# Patient Record
Sex: MALE | Race: WHITE | NOT HISPANIC OR LATINO | Employment: OTHER | URBAN - METROPOLITAN AREA
[De-identification: names, ages, dates, MRNs, and addresses within clinical notes are randomized per-mention and may not be internally consistent; named-entity substitution may affect disease eponyms.]

---

## 2020-09-16 ENCOUNTER — APPOINTMENT (OUTPATIENT)
Dept: CT IMAGING | Facility: CLINIC | Age: 85
DRG: 085 | End: 2020-09-16
Attending: NURSE PRACTITIONER
Payer: COMMERCIAL

## 2020-09-16 ENCOUNTER — HOSPITAL ENCOUNTER (INPATIENT)
Facility: CLINIC | Age: 85
LOS: 5 days | Discharge: NURSING, REHAB OR LONG-TERM CARE | DRG: 085 | End: 2020-09-21
Attending: EMERGENCY MEDICINE | Admitting: INTERNAL MEDICINE
Payer: COMMERCIAL

## 2020-09-16 ENCOUNTER — APPOINTMENT (OUTPATIENT)
Dept: CT IMAGING | Facility: CLINIC | Age: 85
DRG: 085 | End: 2020-09-16
Attending: EMERGENCY MEDICINE
Payer: COMMERCIAL
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
## 2020-09-16 DIAGNOSIS — E78.5 DYSLIPIDEMIA: ICD-10-CM

## 2020-09-16 DIAGNOSIS — I48.20 CHRONIC ATRIAL FIBRILLATION (H): ICD-10-CM

## 2020-09-16 DIAGNOSIS — I62.9 INTRACRANIAL HEMORRHAGE (H): Primary | ICD-10-CM

## 2020-09-16 DIAGNOSIS — S06.330A INTRAPARENCHYMAL HEMATOMA OF BRAIN WITHOUT LOSS OF CONSCIOUSNESS, UNSPECIFIED LATERALITY, INITIAL ENCOUNTER (H): ICD-10-CM

## 2020-09-16 LAB
ALBUMIN UR-MCNC: NEGATIVE MG/DL
ANION GAP SERPL CALCULATED.3IONS-SCNC: 2 MMOL/L (ref 3–14)
APPEARANCE UR: CLEAR
BACTERIA #/AREA URNS HPF: ABNORMAL /HPF
BASOPHILS # BLD AUTO: 0 10E9/L (ref 0–0.2)
BASOPHILS NFR BLD AUTO: 0.3 %
BILIRUB UR QL STRIP: NEGATIVE
BUN SERPL-MCNC: 19 MG/DL (ref 7–30)
CALCIUM SERPL-MCNC: 8.7 MG/DL (ref 8.5–10.1)
CHLORIDE SERPL-SCNC: 107 MMOL/L (ref 94–109)
CO2 SERPL-SCNC: 31 MMOL/L (ref 20–32)
COLOR UR AUTO: YELLOW
CREAT SERPL-MCNC: 1.1 MG/DL (ref 0.66–1.25)
DIFFERENTIAL METHOD BLD: NORMAL
EOSINOPHIL # BLD AUTO: 0.1 10E9/L (ref 0–0.7)
EOSINOPHIL NFR BLD AUTO: 1.6 %
ERYTHROCYTE [DISTWIDTH] IN BLOOD BY AUTOMATED COUNT: 12.4 % (ref 10–15)
GFR SERPL CREATININE-BSD FRML MDRD: 60 ML/MIN/{1.73_M2}
GLUCOSE BLDC GLUCOMTR-MCNC: 105 MG/DL (ref 70–99)
GLUCOSE BLDC GLUCOMTR-MCNC: 76 MG/DL (ref 70–99)
GLUCOSE BLDC GLUCOMTR-MCNC: 91 MG/DL (ref 70–99)
GLUCOSE SERPL-MCNC: 112 MG/DL (ref 70–99)
GLUCOSE UR STRIP-MCNC: NEGATIVE MG/DL
HCT VFR BLD AUTO: 43.2 % (ref 40–53)
HGB BLD-MCNC: 14.6 G/DL (ref 13.3–17.7)
HGB UR QL STRIP: NEGATIVE
IMM GRANULOCYTES # BLD: 0 10E9/L (ref 0–0.4)
IMM GRANULOCYTES NFR BLD: 0.3 %
INR PPP: 1.59 (ref 0.86–1.14)
INR PPP: 1.74 (ref 0.86–1.14)
INTERPRETATION ECG - MUSE: NORMAL
KETONES UR STRIP-MCNC: NEGATIVE MG/DL
LABORATORY COMMENT REPORT: NORMAL
LEUKOCYTE ESTERASE UR QL STRIP: ABNORMAL
LYMPHOCYTES # BLD AUTO: 1.8 10E9/L (ref 0.8–5.3)
LYMPHOCYTES NFR BLD AUTO: 27.4 %
MCH RBC QN AUTO: 33 PG (ref 26.5–33)
MCHC RBC AUTO-ENTMCNC: 33.8 G/DL (ref 31.5–36.5)
MCV RBC AUTO: 98 FL (ref 78–100)
MONOCYTES # BLD AUTO: 0.8 10E9/L (ref 0–1.3)
MONOCYTES NFR BLD AUTO: 11.2 %
NEUTROPHILS # BLD AUTO: 4 10E9/L (ref 1.6–8.3)
NEUTROPHILS NFR BLD AUTO: 59.2 %
NITRATE UR QL: NEGATIVE
NRBC # BLD AUTO: 0 10*3/UL
NRBC BLD AUTO-RTO: 0 /100
PH UR STRIP: 6.5 PH (ref 5–7)
PLATELET # BLD AUTO: 210 10E9/L (ref 150–450)
POTASSIUM SERPL-SCNC: 4.5 MMOL/L (ref 3.4–5.3)
RADIOLOGIST FLAGS: ABNORMAL
RBC # BLD AUTO: 4.43 10E12/L (ref 4.4–5.9)
RBC #/AREA URNS AUTO: 1 /HPF (ref 0–2)
SARS-COV-2 RNA SPEC QL NAA+PROBE: NEGATIVE
SARS-COV-2 RNA SPEC QL NAA+PROBE: NORMAL
SODIUM SERPL-SCNC: 140 MMOL/L (ref 133–144)
SOURCE: ABNORMAL
SP GR UR STRIP: 1.02 (ref 1–1.03)
SPECIMEN SOURCE: NORMAL
SPECIMEN SOURCE: NORMAL
UROBILINOGEN UR STRIP-MCNC: NORMAL MG/DL (ref 0–2)
WBC # BLD AUTO: 6.7 10E9/L (ref 4–11)
WBC #/AREA URNS AUTO: 12 /HPF (ref 0–5)

## 2020-09-16 PROCEDURE — 70496 CT ANGIOGRAPHY HEAD: CPT

## 2020-09-16 PROCEDURE — 85025 COMPLETE CBC W/AUTO DIFF WBC: CPT | Performed by: EMERGENCY MEDICINE

## 2020-09-16 PROCEDURE — 36415 COLL VENOUS BLD VENIPUNCTURE: CPT | Performed by: INTERNAL MEDICINE

## 2020-09-16 PROCEDURE — 30283B1 TRANSFUSION OF NONAUTOLOGOUS 4-FACTOR PROTHROMBIN COMPLEX CONCENTRATE INTO VEIN, PERCUTANEOUS APPROACH: ICD-10-PCS | Performed by: EMERGENCY MEDICINE

## 2020-09-16 PROCEDURE — 25000132 ZZH RX MED GY IP 250 OP 250 PS 637: Mod: GY | Performed by: INTERNAL MEDICINE

## 2020-09-16 PROCEDURE — 85610 PROTHROMBIN TIME: CPT | Performed by: INTERNAL MEDICINE

## 2020-09-16 PROCEDURE — 93005 ELECTROCARDIOGRAM TRACING: CPT

## 2020-09-16 PROCEDURE — 70450 CT HEAD/BRAIN W/O DYE: CPT | Mod: 77

## 2020-09-16 PROCEDURE — 96374 THER/PROPH/DIAG INJ IV PUSH: CPT

## 2020-09-16 PROCEDURE — 99291 CRITICAL CARE FIRST HOUR: CPT | Performed by: PSYCHIATRY & NEUROLOGY

## 2020-09-16 PROCEDURE — 80048 BASIC METABOLIC PNL TOTAL CA: CPT | Performed by: EMERGENCY MEDICINE

## 2020-09-16 PROCEDURE — 25000125 ZZHC RX 250: Performed by: INTERNAL MEDICINE

## 2020-09-16 PROCEDURE — C9132 KCENTRA, PER I.U.: HCPCS | Performed by: EMERGENCY MEDICINE

## 2020-09-16 PROCEDURE — U0003 INFECTIOUS AGENT DETECTION BY NUCLEIC ACID (DNA OR RNA); SEVERE ACUTE RESPIRATORY SYNDROME CORONAVIRUS 2 (SARS-COV-2) (CORONAVIRUS DISEASE [COVID-19]), AMPLIFIED PROBE TECHNIQUE, MAKING USE OF HIGH THROUGHPUT TECHNOLOGIES AS DESCRIBED BY CMS-2020-01-R: HCPCS | Performed by: EMERGENCY MEDICINE

## 2020-09-16 PROCEDURE — 99285 EMERGENCY DEPT VISIT HI MDM: CPT | Mod: 25

## 2020-09-16 PROCEDURE — 25000128 H RX IP 250 OP 636: Performed by: INTERNAL MEDICINE

## 2020-09-16 PROCEDURE — 81001 URINALYSIS AUTO W/SCOPE: CPT | Performed by: EMERGENCY MEDICINE

## 2020-09-16 PROCEDURE — 25000555 ZZHC RX FACTOR IP 250 OP 636: Performed by: EMERGENCY MEDICINE

## 2020-09-16 PROCEDURE — 00000146 ZZHCL STATISTIC GLUCOSE BY METER IP

## 2020-09-16 PROCEDURE — 20000003 ZZH R&B ICU

## 2020-09-16 PROCEDURE — 99223 1ST HOSP IP/OBS HIGH 75: CPT | Mod: AI | Performed by: INTERNAL MEDICINE

## 2020-09-16 PROCEDURE — 25800030 ZZH RX IP 258 OP 636: Performed by: INTERNAL MEDICINE

## 2020-09-16 PROCEDURE — 87086 URINE CULTURE/COLONY COUNT: CPT | Performed by: INTERNAL MEDICINE

## 2020-09-16 PROCEDURE — 85610 PROTHROMBIN TIME: CPT | Performed by: EMERGENCY MEDICINE

## 2020-09-16 PROCEDURE — C9803 HOPD COVID-19 SPEC COLLECT: HCPCS

## 2020-09-16 PROCEDURE — 70450 CT HEAD/BRAIN W/O DYE: CPT

## 2020-09-16 RX ORDER — POTASSIUM CHLORIDE 1500 MG/1
20-40 TABLET, EXTENDED RELEASE ORAL
Status: DISCONTINUED | OUTPATIENT
Start: 2020-09-16 | End: 2020-09-21 | Stop reason: HOSPADM

## 2020-09-16 RX ORDER — NALOXONE HYDROCHLORIDE 0.4 MG/ML
.1-.4 INJECTION, SOLUTION INTRAMUSCULAR; INTRAVENOUS; SUBCUTANEOUS
Status: DISCONTINUED | OUTPATIENT
Start: 2020-09-16 | End: 2020-09-21 | Stop reason: HOSPADM

## 2020-09-16 RX ORDER — ONDANSETRON 4 MG/1
4 TABLET, ORALLY DISINTEGRATING ORAL EVERY 6 HOURS PRN
Status: DISCONTINUED | OUTPATIENT
Start: 2020-09-16 | End: 2020-09-21 | Stop reason: HOSPADM

## 2020-09-16 RX ORDER — METOCLOPRAMIDE 5 MG/1
5 TABLET ORAL EVERY 6 HOURS PRN
Status: DISCONTINUED | OUTPATIENT
Start: 2020-09-16 | End: 2020-09-21 | Stop reason: HOSPADM

## 2020-09-16 RX ORDER — ONDANSETRON 2 MG/ML
4 INJECTION INTRAMUSCULAR; INTRAVENOUS EVERY 6 HOURS PRN
Status: DISCONTINUED | OUTPATIENT
Start: 2020-09-16 | End: 2020-09-21 | Stop reason: HOSPADM

## 2020-09-16 RX ORDER — VENLAFAXINE HYDROCHLORIDE 75 MG/1
225 CAPSULE, EXTENDED RELEASE ORAL EVERY EVENING
Status: DISCONTINUED | OUTPATIENT
Start: 2020-09-16 | End: 2020-09-21 | Stop reason: HOSPADM

## 2020-09-16 RX ORDER — PROCHLORPERAZINE MALEATE 5 MG
5 TABLET ORAL EVERY 6 HOURS PRN
Status: DISCONTINUED | OUTPATIENT
Start: 2020-09-16 | End: 2020-09-21 | Stop reason: HOSPADM

## 2020-09-16 RX ORDER — POTASSIUM CHLORIDE 29.8 MG/ML
20 INJECTION INTRAVENOUS
Status: DISCONTINUED | OUTPATIENT
Start: 2020-09-16 | End: 2020-09-21 | Stop reason: HOSPADM

## 2020-09-16 RX ORDER — LABETALOL HYDROCHLORIDE 5 MG/ML
10-20 INJECTION, SOLUTION INTRAVENOUS EVERY 10 MIN PRN
Status: DISCONTINUED | OUTPATIENT
Start: 2020-09-16 | End: 2020-09-21 | Stop reason: HOSPADM

## 2020-09-16 RX ORDER — AMOXICILLIN 250 MG
1 CAPSULE ORAL 2 TIMES DAILY PRN
Status: DISCONTINUED | OUTPATIENT
Start: 2020-09-16 | End: 2020-09-21 | Stop reason: HOSPADM

## 2020-09-16 RX ORDER — ACETAMINOPHEN 325 MG/1
650 TABLET ORAL EVERY 4 HOURS PRN
Status: DISCONTINUED | OUTPATIENT
Start: 2020-09-16 | End: 2020-09-21 | Stop reason: HOSPADM

## 2020-09-16 RX ORDER — AMOXICILLIN 250 MG
1 CAPSULE ORAL 2 TIMES DAILY
COMMUNITY
End: 2021-01-08

## 2020-09-16 RX ORDER — POTASSIUM CHLORIDE 7.45 MG/ML
10 INJECTION INTRAVENOUS
Status: DISCONTINUED | OUTPATIENT
Start: 2020-09-16 | End: 2020-09-21 | Stop reason: HOSPADM

## 2020-09-16 RX ORDER — ACETAMINOPHEN 325 MG/1
650 TABLET ORAL EVERY 4 HOURS PRN
COMMUNITY
End: 2021-01-18

## 2020-09-16 RX ORDER — VENLAFAXINE HYDROCHLORIDE 225 MG/1
225 TABLET, EXTENDED RELEASE ORAL EVERY EVENING
COMMUNITY
End: 2022-01-01

## 2020-09-16 RX ORDER — METOCLOPRAMIDE HYDROCHLORIDE 5 MG/ML
5 INJECTION INTRAMUSCULAR; INTRAVENOUS EVERY 6 HOURS PRN
Status: DISCONTINUED | OUTPATIENT
Start: 2020-09-16 | End: 2020-09-21 | Stop reason: HOSPADM

## 2020-09-16 RX ORDER — POLYETHYLENE GLYCOL 3350 17 G/17G
17 POWDER, FOR SOLUTION ORAL DAILY PRN
Status: DISCONTINUED | OUTPATIENT
Start: 2020-09-16 | End: 2020-09-21 | Stop reason: HOSPADM

## 2020-09-16 RX ORDER — POTASSIUM CL/LIDO/0.9 % NACL 10MEQ/0.1L
10 INTRAVENOUS SOLUTION, PIGGYBACK (ML) INTRAVENOUS
Status: DISCONTINUED | OUTPATIENT
Start: 2020-09-16 | End: 2020-09-21 | Stop reason: HOSPADM

## 2020-09-16 RX ORDER — ACETAMINOPHEN 650 MG/1
650 SUPPOSITORY RECTAL EVERY 4 HOURS PRN
Status: DISCONTINUED | OUTPATIENT
Start: 2020-09-16 | End: 2020-09-21 | Stop reason: HOSPADM

## 2020-09-16 RX ORDER — HYDRALAZINE HYDROCHLORIDE 20 MG/ML
10-20 INJECTION INTRAMUSCULAR; INTRAVENOUS
Status: DISCONTINUED | OUTPATIENT
Start: 2020-09-16 | End: 2020-09-21 | Stop reason: HOSPADM

## 2020-09-16 RX ORDER — SODIUM CHLORIDE 9 MG/ML
INJECTION, SOLUTION INTRAVENOUS CONTINUOUS
Status: DISCONTINUED | OUTPATIENT
Start: 2020-09-16 | End: 2020-09-18

## 2020-09-16 RX ORDER — LANOLIN ALCOHOL/MO/W.PET/CERES
3 CREAM (GRAM) TOPICAL AT BEDTIME
COMMUNITY

## 2020-09-16 RX ORDER — AMOXICILLIN 250 MG
2 CAPSULE ORAL 2 TIMES DAILY PRN
Status: DISCONTINUED | OUTPATIENT
Start: 2020-09-16 | End: 2020-09-21 | Stop reason: HOSPADM

## 2020-09-16 RX ORDER — PROCHLORPERAZINE 25 MG
12.5 SUPPOSITORY, RECTAL RECTAL EVERY 12 HOURS PRN
Status: DISCONTINUED | OUTPATIENT
Start: 2020-09-16 | End: 2020-09-21 | Stop reason: HOSPADM

## 2020-09-16 RX ORDER — POTASSIUM CHLORIDE 1.5 G/1.58G
20-40 POWDER, FOR SOLUTION ORAL
Status: DISCONTINUED | OUTPATIENT
Start: 2020-09-16 | End: 2020-09-21 | Stop reason: HOSPADM

## 2020-09-16 RX ORDER — CHOLECALCIFEROL (VITAMIN D3) 50 MCG
50 TABLET ORAL EVERY MORNING
COMMUNITY

## 2020-09-16 RX ORDER — IOPAMIDOL 755 MG/ML
70 INJECTION, SOLUTION INTRAVASCULAR ONCE
Status: COMPLETED | OUTPATIENT
Start: 2020-09-16 | End: 2020-09-16

## 2020-09-16 RX ORDER — POLYETHYLENE GLYCOL 3350 17 G/17G
1 POWDER, FOR SOLUTION ORAL DAILY
COMMUNITY
End: 2022-01-01

## 2020-09-16 RX ADMIN — FAMOTIDINE 20 MG: 10 INJECTION, SOLUTION INTRAVENOUS at 22:24

## 2020-09-16 RX ADMIN — IOPAMIDOL 70 ML: 755 INJECTION, SOLUTION INTRAVENOUS at 15:46

## 2020-09-16 RX ADMIN — VENLAFAXINE HYDROCHLORIDE 225 MG: 75 CAPSULE, EXTENDED RELEASE ORAL at 22:23

## 2020-09-16 RX ADMIN — PROTHROMBIN, COAGULATION FACTOR VII HUMAN, COAGULATION FACTOR IX HUMAN, COAGULATION FACTOR X HUMAN, PROTEIN C, PROTEIN S HUMAN, AND WATER 4586 UNITS: KIT at 11:17

## 2020-09-16 RX ADMIN — SODIUM CHLORIDE: 9 INJECTION, SOLUTION INTRAVENOUS at 14:08

## 2020-09-16 RX ADMIN — SODIUM CHLORIDE 90 ML: 9 INJECTION, SOLUTION INTRAVENOUS at 15:46

## 2020-09-16 SDOH — HEALTH STABILITY: MENTAL HEALTH: HOW OFTEN DO YOU HAVE A DRINK CONTAINING ALCOHOL?: NEVER

## 2020-09-16 ASSESSMENT — VISUAL ACUITY
OU: BASELINE

## 2020-09-16 ASSESSMENT — ACTIVITIES OF DAILY LIVING (ADL)
ADLS_ACUITY_SCORE: 25
ADLS_ACUITY_SCORE: 24

## 2020-09-16 ASSESSMENT — ENCOUNTER SYMPTOMS
DIZZINESS: 0
FREQUENCY: 0
VOMITING: 0
WOUND: 1
ARTHRALGIAS: 0
COUGH: 0
DYSURIA: 0
FEVER: 0
JOINT SWELLING: 1
HEMATURIA: 0
HEADACHES: 0
BLOOD IN STOOL: 0
SHORTNESS OF BREATH: 0
LIGHT-HEADEDNESS: 0

## 2020-09-16 NOTE — PLAN OF CARE
SLP: Orders received, chart reviewed. Pt passed RN dysphagia screen = OK for a regular diet, text paged MD. SLP to follow up tomorrow, after PO intake, to see if any difficulty or concerns are noted that would warrant a full dysphagia evaluation. RN in agreement.

## 2020-09-16 NOTE — PLAN OF CARE
PT-Reviewed chart and discussed with nurse. Patient currently has bedrest orders. Will reschedule for tomorrow afternoon so bedrest orders can be addressed.

## 2020-09-16 NOTE — ED TRIAGE NOTES
Pt reports having a ground level fall this AM while going to the bathroom. Pt tripped and fell on right knee, then hitting top of head. No LOC. Pt on xarelto.

## 2020-09-16 NOTE — CONSULTS
"  [facility]    Stroke Consult Note    Reason for Consult:  ICH    Chief Complaint: Fall       HPI  Maricarmen Lovell is a 86 year old male with past medical history significant for atrial fibrillation (on Xarelto), Alzheimer's, CKD, HTN, HLD and recurrent falls who presented for evaluation after a fall. He reportedly sustained a mechanical fall when leaving the bathroom, turning and hitting his head. CTH revealed a small acute IPH in the right basal ganglia. He was reversed with KCentra in the ED. Repeat CTH 6 hours later was stable.    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 We will continue to follow.     NARESH Snider, CNP  Neurology  To page me or covering stroke neurology team member, click here: AMCOM   Choose \"On Call\" tab at top, then search dropdown box for \"Neurology Adult\", select location, press Enter, then look for stroke/neuro ICU/telestroke.    _____________________________________________________    Past Medical History   History reviewed. No pertinent past medical history.  Past Surgical History   History reviewed. No pertinent surgical history.  Medications   Home Meds  Prior to Admission medications    Medication Sig Start Date End Date Taking? Authorizing Provider   acetaminophen (TYLENOL) 325 MG tablet Take 650 mg by mouth every 4 hours as needed for mild pain   Yes Unknown, Entered By History   hypromellose (ARTIFICIAL TEARS) 0.5 % SOLN ophthalmic solution Place 1 drop into both eyes every hour as needed for dry eyes   Yes Unknown, Entered By History   melatonin 3 MG tablet Take 1 mg by mouth At Bedtime   Yes Unknown, Entered By History   polyethylene glycol (MIRALAX) 17 g packet Take 1 packet by mouth daily as needed for constipation   Yes Unknown, Entered By " History   psyllium (METAMUCIL/KONSYL) Packet Take 1 packet by mouth daily as needed for constipation   Yes Unknown, Entered By History   rivaroxaban ANTICOAGULANT (XARELTO) 15 MG TABS tablet Take 15 mg by mouth every morning   Yes Unknown, Entered By History   senna-docusate (SENNA S) 8.6-50 MG tablet Take 1 tablet by mouth 2 times daily   Yes Unknown, Entered By History   venlafaxine (EFFEXOR-ER) 225 MG 24 hr tablet Take 225 mg by mouth daily   Yes Unknown, Entered By History   vitamin D3 (CHOLECALCIFEROL) 50 mcg (2000 units) tablet Take 1 tablet by mouth daily   Yes Unknown, Entered By History       Scheduled Meds    famotidine  20 mg Intravenous Q12H     venlafaxine  225 mg Oral QPM       Infusion Meds    - MEDICATION INSTRUCTIONS -       sodium chloride 75 mL/hr at 09/16/20 1408       PRN Meds  sodium chloride 0.9%, acetaminophen, acetaminophen, hydrALAZINE, hypromellose-dextran, labetalol, - MEDICATION INSTRUCTIONS -, metoclopramide **OR** metoclopramide, naloxone, ondansetron **OR** ondansetron, polyethylene glycol, potassium chloride, potassium chloride with lidocaine, potassium chloride, potassium chloride, potassium chloride, prochlorperazine **OR** prochlorperazine **OR** prochlorperazine, senna-docusate **OR** senna-docusate    Allergies   Allergies   Allergen Reactions     Pecan [Nuts] Anaphylaxis     Fluoxetine Nausea     Family History   History reviewed. No pertinent family history.  Social History   Social History     Tobacco Use     Smoking status: Never Smoker     Smokeless tobacco: Never Used   Substance Use Topics     Alcohol use: Never     Frequency: Never     Drug use: Never       Review of Systems   The 10 point Review of Systems is negative other than noted in the HPI or here.        PHYSICAL EXAMINATION   Temp:  [96.5  F (35.8  C)-98.3  F (36.8  C)] 98.3  F (36.8  C)  Pulse:  [55-77] 74  Resp:  [8-19] 16  BP: ()/(40-99) 114/74  SpO2:  [92 %-98 %] 96 %    Neurologic  Mental Status:   alert, difficulty with orientation questions, able to provide history of events of morning but confused appearing at times, follows commands, speech clear and without obvious WFD  Cranial Nerves:  PERRL, EOMI with normal smooth pursuit, facial movements symmetric, hearing not formally tested but intact to conversation, no dysarthria, tongue protrusion midline  Motor:  normal muscle tone and bulk, no abnormal movements, able to move all limbs spontaneously, no pronator drift, resting tremor in upper extremities  Reflexes:  toes down-going  Sensory:  light touch sensation intact and symmetric throughout upper and lower extremities, no extinction on double simultaneous stimulation   Coordination:  no obvious ataxia  Station/Gait:  deferred    Dysphagia Screen  Per Nursing    Stroke Scales    ICH Score (at arrival)  Scoring Tool Score   Age ? 80 years 1 point Yes   GCS score  3-4   5-12   13-15   2 point  1 point  0 point GCS 13-15   Hematoma volume, cm 3 ? 30 1 point No   Intraventricular extension 1 point No   Infratentorial location 1 point No   Total 1       Imaging  I personally reviewed all imaging; relevant findings per HPI.    Labs Data   CBC  Recent Labs   Lab 09/16/20  1014   WBC 6.7   RBC 4.43   HGB 14.6   HCT 43.2        Basic Metabolic Panel   Recent Labs   Lab 09/16/20  1014      POTASSIUM 4.5   CHLORIDE 107   CO2 31   BUN 19   CR 1.10   *   BEATRIZ 8.7     Liver Panel  No results for input(s): PROTTOTAL, ALBUMIN, BILITOTAL, ALKPHOS, AST, ALT, BILIDIRECT in the last 168 hours.  INR    Recent Labs   Lab Test 09/16/20  1511 09/16/20  1014   INR 1.74* 1.59*      Lipid Profile  No lab results found.  A1C  No lab results found.  Troponin I  No results for input(s): TROPI in the last 168 hours.       Stroke Code / Stroke Consult Data Data This was a non-emergent, non-tele stroke consult.

## 2020-09-16 NOTE — PHARMACY-ADMISSION MEDICATION HISTORY
Pharmacy Medication History  Admission medication history interview status for the 9/16/2020  admission is complete. See EPIC admission navigator for prior to admission medications     Medication history sources: MAR ([facility])  Medication history source reliability: Good  Adherence assessment: Good    Additional medication history information:   None    Medication reconciliation completed by provider prior to medication history? No    Time spent in this activity: 15 minutes       Prior to Admission medications    Medication Sig Last Dose Taking? Auth Provider   acetaminophen (TYLENOL) 325 MG tablet Take 650 mg by mouth every 4 hours as needed for mild pain prn at prn Yes Unknown, Entered By History   hypromellose (ARTIFICIAL TEARS) 0.5 % SOLN ophthalmic solution Place 1 drop into both eyes every hour as needed for dry eyes prn at prn Yes Unknown, Entered By History   melatonin 3 MG tablet Take 1 mg by mouth At Bedtime 9/15/2020 at 2000 Yes Unknown, Entered By History   polyethylene glycol (MIRALAX) 17 g packet Take 1 packet by mouth daily as needed for constipation prn at prn Yes Unknown, Entered By History   psyllium (METAMUCIL/KONSYL) Packet Take 1 packet by mouth daily as needed for constipation prn at prn Yes Unknown, Entered By History   rivaroxaban ANTICOAGULANT (XARELTO) 15 MG TABS tablet Take 15 mg by mouth every morning 9/16/2020 at 0800 Yes Unknown, Entered By History   senna-docusate (SENNA S) 8.6-50 MG tablet Take 1 tablet by mouth 2 times daily 9/16/2020 at 0800 Yes Unknown, Entered By History   venlafaxine (EFFEXOR-ER) 225 MG 24 hr tablet Take 225 mg by mouth daily 9/15/2020 at 2000 Yes Unknown, Entered By History   vitamin D3 (CHOLECALCIFEROL) 50 mcg (2000 units) tablet Take 1 tablet by mouth daily 9/16/2020 at 0800 Yes Unknown, Entered By History     Tila Lewis, PharmD  [phone]

## 2020-09-16 NOTE — ED NOTES
Bed: ED24  Expected date:   Expected time:   Means of arrival:   Comments:  mariana - 84 M fall no covid eta 0808

## 2020-09-16 NOTE — PLAN OF CARE
Alert and oriented throughout day, Answers question appropriately throughout afternoon but orientation waxes and wanes this evening, Sundowning? Neuro's charted, See flowsheet, No deficits observed. Tele AFib +CVR. CMS+. BP unremarkable. Afebrile. LS clear, on RA. Denies any HA or pain. Impulsive and forgetful at times, needs frequent redirection after 5pm. Bed rest. Passed bedside swallow. Regular diet and tolerated well. Voiding in urinal x1. Wounds documented on arrival. Repeat CT done this afternoon, See note. Nursing to follow closely

## 2020-09-16 NOTE — ED PROVIDER NOTES
History     Chief Complaint:  Fall       The history is provided by the patient and the EMS personnel.     Maricarmen Lovell is a 86 year old male currently taking Xarelto for atrial fibrillation who has Alzheimer's with a history of CKD - stage III, emphysema, hypertension, hyperlipidemia, and repeated falls among others listed below who presents via EMS for evaluation after a mechanical ground level fall that he sustained earlier this morning. He reports that he was coming out of the bathroom this morning when he turned and fell, hitting his head on some shelving. The patient denies feeling lightheaded or dizzy before the fall. He did not lost consciousness.    En route, EMS reports that the patient's blood sugar was 169 and his blood pressure was normal.     Here, he reports some slight swelling in his right knee, but denies any current head pain, lightheadedness, dizziness, fever, cough, chest pressure, chest heaviness, hematochezia, vomiting, arm or leg swelling, hip pain, new shortness of breath, or urinary changes. Of note, the patient reports he had a similar fall about 3 years ago. The patient typically ambulates with a walker. His last Tdap was in 2012, per [facility].     Allergies:  Fluoxetine     Medications:   Xarelto  Venlafaxine    Medical History:   Hypertension  Atrial fibrillation  Emphysema  Hyperlipidemia  CKD -  stage III  Alzheimer's   Major depressive disorder  Falls  Acute respiratory distress syndrome    Surgical History   History reviewed. No pertinent past surgical history.     Family History:   History reviewed. No pertinent family history.       Social History:  The patient was accompanied to the ED by EMS.      Review of Systems   Constitutional: Negative for fever.   Respiratory: Negative for cough and shortness of breath.         Negative for chest pressure, chest heaviness   Gastrointestinal: Negative for blood in stool and vomiting.   Genitourinary: Negative for dysuria, frequency, hematuria  and urgency.   Musculoskeletal: Positive for joint swelling (right knee). Negative for arthralgias (hip).   Skin: Positive for wound (scalp abrasion).   Neurological: Negative for dizziness, light-headedness and headaches.   All other systems reviewed and are negative.        Physical Exam     Patient Vitals for the past 24 hrs:   BP Temp Temp src Pulse Resp SpO2 Weight   09/16/20 1015 110/76 -- -- 68 -- 95 % 113.4 kg (250 lb)   09/16/20 1010 (!) 87/40 -- -- 68 -- 92 % --   09/16/20 0840 118/67 96.5  F (35.8  C) Temporal 77 18 95 % --          Physical Exam  Nursing note and vitals reviewed.    Constitutional:  Appears comfortable and very pleasant.    HENT:    Nose without blood or discharge. He has an abrasion about 2 cm in size on the crown of his head.  Eyes:    Conjunctivae are normal without injection.     Pupils are equal.  Cardiovascular:  Normal rate, irregular rhythm with normal S1 and S2.      Normal heart sounds and peripheral pulses 2+ and equal.       No murmur or kiesha.  Pulmonary:  Effort normal and breath sounds clear to auscultation bilaterally.     No respiratory distress.  No stridor.     No wheezes. No rales.   No chest wall tenderness.  GI:    Soft. No distension and no mass. No tenderness.   Musculoskeletal:  Normal range of motion in all 4 extremities without pain. No extremity deformity.     He has a small abrasion over the right knee but no tenderness.  No midline cervical tenderness, full range of motion of the neck.  Some stiffness in his trapezius muscle.  Neurological:   Alert and appropriate. No focal weakness.     Exhibits good muscle tone. Coordination normal.      GCS eye subscore is 4. GCS verbal subscore is 5.      GCS motor subscore is 6.   Skin:    Skin is warm and dry.  Abrasion on his scalp and right knee as noted above.  Psychiatric:   Behavior is normal. Appropriate mood and affect.  Not confused at this time.     Patient appropriate for the moment.  Has known Alzheimer's  dementia.        Emergency Department Course     ECG:  ECG taken at 1052, ECG read at 1100  Atrial fibrillation with slow ventricular response  Abnormal ECG  Rate 57 bpm. WV interval * ms. QRS duration 78 ms. QT/QTc 412/401 ms. P-R-T axes * 38 53.    Imaging:  Radiology results were communicated with the patient who voiced understanding of the findings.    CT Head w/o Contrast:  IMPRESSION:   1. Small acute intraparenchymal hematoma within the right basal   ganglia/prefrontal region inferior to the caudate nucleus. No   significant mass effect. Recommend continued follow-up.   2. 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   Reading per radiology.    Laboratory:  Laboratory findings were communicated with the patient who voiced understanding of the findings.    CBC: WBC 6.7, HGB 14.6,   CMP: Anion Gap 2 (L), Glucose 112 (H), Creat 1.10 o/w WNL  INR: 1.59 (H)      UA with Microscopic: Pending    Interventions:   1117 Kcentra 4586 units, IV    Emergency Department Course:    Nursing notes and vitals reviewed.    0838 I performed an exam of the patient as documented above.     0934 The patient was sent for CT while in the emergency department, results above.     1014 IV was inserted and blood was drawn for laboratory testing, results above.     1023 I spoke with Dr. Shari Severino of the stroke neurology service regarding patient's presentation, findings, and plan of care.     The patient provided a urine sample here in the emergency department. This was sent for laboratory testing, findings above.     The patient's nose was swabbed and this sample was sent for COVID testing, findings above.      1042  I consulted with Dr. Pederson of the hospitalist services. They are in agreement to accept the patient for admission. Findings and plan explained to the Patient who consents to admission. Discussed the patient with Dr. Pederson, who will admit the patient to an adult ICU bed for  further monitoring, evaluation, and treatment.    Impression & Plan     Medical Decision Making:  Patient comes in after falling and hitting his head.  There was just a superficial abrasion on the top of his head but he is on Xarelto so I did get a CT scan.  It shows an intraparenchymal hemorrhage in the basal ganglia.  I contacted Shari from neuro critical care and she recommended K Centra and admission to the ICU.  I talked with Dr. Pederson who will be admitting the patient.  The patient does not complain of a headache.  Routine labs are obtained and an EKG which shows atrial fibrillation which is chronic.  His basic metabolic panel is normal, glucose normal and CBC is normal.  Urine is ordered but he has not gone to the bathroom yet.  He will need a repeat CT scan in 6 hours.  His blood pressure is under good control so he does not need any control at this time but it should be kept under 140 systolic.  An asymptomatic COVID swab was ordered.    Critical care time minus procedures: 55 minutes    Covid-19  Maricarmen Lovell was evaluated during a global COVID-19 pandemic, which necessitated consideration that the patient might be at risk for infection with the SARS-CoV-2 virus that causes COVID-19.   Applicable protocols for evaluation were followed during the patient's care.   COVID-19 was considered as part of the patient's evaluation. The plan for testing is:  a test was obtained during this visit.      Diagnosis:     ICD-10-CM    1. Intraparenchymal hematoma of brain without loss of consciousness, unspecified laterality, initial encounter (H)  S06.360A CBC with platelets + differential     Basic metabolic panel     INR    right basal ganglia   2. Fall, initial encounter  W19.XXXA         Disposition:  Admitted to Dr. Pederson  ICU.  Neuro critical care consultation, repeat CT in 6 hours.  Keep blood pressure 140 or under.  K Centra.    Scribe Disclosure:  Roselyn ARREGUIN, am serving as a scribe at 8:46 AM on  9/16/2020 to document services personally performed by Kayla Jones MD based on my observations and the provider's statements to me.        Kayla Jones MD  09/16/20 1132     0

## 2020-09-16 NOTE — ED NOTES
[facility]  ED Nurse Handoff Report    ED Chief complaint: Fall      ED Diagnosis:   Final diagnoses:   Intraparenchymal hematoma of brain without loss of consciousness, unspecified laterality, initial encounter (H)       Code Status: Full Code    Allergies:   Allergies   Allergen Reactions     Pecan [Nuts] Anaphylaxis     Fluoxetine Nausea       Patient Story: Pt presents to the ED via EMS from assisted living facility after having a fall this AM while walking to the bathroom. Pt states that he lost his footing, causing him to fall to his right knee and then forward, hitting the top of his head. Hx of alzheimers.   Focused Assessment:  A&O x2. Unsure of baseline. Denies CP or SOB. C/o pain to right knee and top of head. No numbness or weakness. Follows commands.    Treatments and/or interventions provided: K-centra  Patient's response to treatments and/or interventions: n/a    To be done/followed up on inpatient unit:  n/a    Does this patient have any cognitive concerns?: Alzheimer's    Activity level - Baseline/Home:  Walker  Activity Level - Current:   Unknown    Patient's Preferred language: Data Unavailable   Needed?: No    Isolation: None  Infection: Not Applicable  Bariatric?: No    Vital Signs:   Vitals:    09/16/20 0840 09/16/20 1010 09/16/20 1015   BP: 118/67 (!) 87/40 110/76   Pulse: 77 68 68   Resp: 18     Temp: 96.5  F (35.8  C)     TempSrc: Temporal     SpO2: 95% 92% 95%   Weight:   113.4 kg (250 lb)       Cardiac Rhythm:     Was the PSS-3 completed:   Yes  What interventions are required if any?               Family Comments: n/a  OBS brochure/video discussed/provided to patient/family: No              Name of person given brochure if not patient: n/a              Relationship to patient: n/a    For the majority of the shift this patient's behavior was Green.   Behavioral interventions performed were n/a.    ED NURSE PHONE NUMBER: *[phone]

## 2020-09-17 ENCOUNTER — APPOINTMENT (OUTPATIENT)
Dept: MRI IMAGING | Facility: CLINIC | Age: 85
DRG: 085 | End: 2020-09-17
Attending: INTERNAL MEDICINE
Payer: COMMERCIAL

## 2020-09-17 ENCOUNTER — APPOINTMENT (OUTPATIENT)
Dept: OCCUPATIONAL THERAPY | Facility: CLINIC | Age: 85
DRG: 085 | End: 2020-09-17
Attending: INTERNAL MEDICINE
Payer: COMMERCIAL

## 2020-09-17 ENCOUNTER — APPOINTMENT (OUTPATIENT)
Dept: SPEECH THERAPY | Facility: CLINIC | Age: 85
DRG: 085 | End: 2020-09-17
Attending: INTERNAL MEDICINE
Payer: COMMERCIAL

## 2020-09-17 LAB
ANION GAP SERPL CALCULATED.3IONS-SCNC: 8 MMOL/L (ref 3–14)
BACTERIA SPEC CULT: NORMAL
BUN SERPL-MCNC: 15 MG/DL (ref 7–30)
CALCIUM SERPL-MCNC: 8.6 MG/DL (ref 8.5–10.1)
CHLORIDE SERPL-SCNC: 105 MMOL/L (ref 94–109)
CO2 SERPL-SCNC: 23 MMOL/L (ref 20–32)
CREAT SERPL-MCNC: 1.1 MG/DL (ref 0.66–1.25)
ERYTHROCYTE [DISTWIDTH] IN BLOOD BY AUTOMATED COUNT: 12.4 % (ref 10–15)
GFR SERPL CREATININE-BSD FRML MDRD: 60 ML/MIN/{1.73_M2}
GLUCOSE BLDC GLUCOMTR-MCNC: 141 MG/DL (ref 70–99)
GLUCOSE BLDC GLUCOMTR-MCNC: 147 MG/DL (ref 70–99)
GLUCOSE BLDC GLUCOMTR-MCNC: 164 MG/DL (ref 70–99)
GLUCOSE BLDC GLUCOMTR-MCNC: 85 MG/DL (ref 70–99)
GLUCOSE BLDC GLUCOMTR-MCNC: 89 MG/DL (ref 70–99)
GLUCOSE SERPL-MCNC: 196 MG/DL (ref 70–99)
HCT VFR BLD AUTO: 45.9 % (ref 40–53)
HGB BLD-MCNC: 15.8 G/DL (ref 13.3–17.7)
INR PPP: 1.24 (ref 0.86–1.14)
Lab: NORMAL
MAGNESIUM SERPL-MCNC: 2 MG/DL (ref 1.6–2.3)
MCH RBC QN AUTO: 33.6 PG (ref 26.5–33)
MCHC RBC AUTO-ENTMCNC: 34.4 G/DL (ref 31.5–36.5)
MCV RBC AUTO: 98 FL (ref 78–100)
PHOSPHATE SERPL-MCNC: 2.5 MG/DL (ref 2.5–4.5)
PLATELET # BLD AUTO: 228 10E9/L (ref 150–450)
POTASSIUM SERPL-SCNC: 3.9 MMOL/L (ref 3.4–5.3)
RBC # BLD AUTO: 4.7 10E12/L (ref 4.4–5.9)
SODIUM SERPL-SCNC: 136 MMOL/L (ref 133–144)
SPECIMEN SOURCE: NORMAL
WBC # BLD AUTO: 7.5 10E9/L (ref 4–11)

## 2020-09-17 PROCEDURE — 99232 SBSQ HOSP IP/OBS MODERATE 35: CPT | Performed by: INTERNAL MEDICINE

## 2020-09-17 PROCEDURE — 97530 THERAPEUTIC ACTIVITIES: CPT | Mod: GO

## 2020-09-17 PROCEDURE — 99291 CRITICAL CARE FIRST HOUR: CPT | Performed by: PSYCHIATRY & NEUROLOGY

## 2020-09-17 PROCEDURE — 25000125 ZZHC RX 250: Performed by: INTERNAL MEDICINE

## 2020-09-17 PROCEDURE — 97165 OT EVAL LOW COMPLEX 30 MIN: CPT | Mod: GO

## 2020-09-17 PROCEDURE — 70553 MRI BRAIN STEM W/O & W/DYE: CPT

## 2020-09-17 PROCEDURE — A9585 GADOBUTROL INJECTION: HCPCS | Performed by: INTERNAL MEDICINE

## 2020-09-17 PROCEDURE — 25000132 ZZH RX MED GY IP 250 OP 250 PS 637: Performed by: INTERNAL MEDICINE

## 2020-09-17 PROCEDURE — 85027 COMPLETE CBC AUTOMATED: CPT | Performed by: INTERNAL MEDICINE

## 2020-09-17 PROCEDURE — 12000000 ZZH R&B MED SURG/OB

## 2020-09-17 PROCEDURE — 00000146 ZZHCL STATISTIC GLUCOSE BY METER IP

## 2020-09-17 PROCEDURE — 84100 ASSAY OF PHOSPHORUS: CPT | Performed by: INTERNAL MEDICINE

## 2020-09-17 PROCEDURE — 25500064 ZZH RX 255 OP 636: Performed by: INTERNAL MEDICINE

## 2020-09-17 PROCEDURE — 36415 COLL VENOUS BLD VENIPUNCTURE: CPT | Performed by: INTERNAL MEDICINE

## 2020-09-17 PROCEDURE — 83735 ASSAY OF MAGNESIUM: CPT | Performed by: INTERNAL MEDICINE

## 2020-09-17 PROCEDURE — 80048 BASIC METABOLIC PNL TOTAL CA: CPT | Performed by: INTERNAL MEDICINE

## 2020-09-17 PROCEDURE — 92610 EVALUATE SWALLOWING FUNCTION: CPT | Mod: GN | Performed by: SPEECH-LANGUAGE PATHOLOGIST

## 2020-09-17 PROCEDURE — 85610 PROTHROMBIN TIME: CPT | Performed by: INTERNAL MEDICINE

## 2020-09-17 RX ORDER — GADOBUTROL 604.72 MG/ML
10 INJECTION INTRAVENOUS ONCE
Status: COMPLETED | OUTPATIENT
Start: 2020-09-17 | End: 2020-09-17

## 2020-09-17 RX ADMIN — FAMOTIDINE 20 MG: 10 INJECTION, SOLUTION INTRAVENOUS at 20:31

## 2020-09-17 RX ADMIN — GADOBUTROL 10 ML: 604.72 INJECTION INTRAVENOUS at 13:22

## 2020-09-17 RX ADMIN — FAMOTIDINE 20 MG: 10 INJECTION, SOLUTION INTRAVENOUS at 08:58

## 2020-09-17 RX ADMIN — VENLAFAXINE HYDROCHLORIDE 225 MG: 75 CAPSULE, EXTENDED RELEASE ORAL at 23:55

## 2020-09-17 ASSESSMENT — VISUAL ACUITY
OU: BASELINE

## 2020-09-17 ASSESSMENT — ACTIVITIES OF DAILY LIVING (ADL)
ADLS_ACUITY_SCORE: 29
ADLS_ACUITY_SCORE: 25
ADLS_ACUITY_SCORE: 15.5
ADLS_ACUITY_SCORE: 27
ADLS_ACUITY_SCORE: 29
ADLS_ACUITY_SCORE: 15.5

## 2020-09-17 NOTE — PROGRESS NOTES
"  [facility]    Stroke Progress Note    Interval Events  Patient eating breakfast with SLP at time of eval. Has been seeing little critters in the room this AM per patient and SLP, quite confused.  BP in 100-120 range overnight. Na is 136.    Impression  1. R basal ganglia intraparenchymal hemorrhage, ICH score 1, location suggestive of nontraumatic or spontaneous hemorrhage (possibly associated with warfarin coagulopathy) however patient did have mechanical fall at onset so cannot rule out traumatic. However poor historian due to memory loss  2. Dementia    Plan  - MRI brain w & wo gadolinium to eval for enhancing mass lesion, microhemorrhages (given \"Alzheimer's\" history + new IPH, albeit not cortical, eval for CAA)  - Hold all antithrombotics for now: Regarding anticoagulation which he takes for atrial fibrillation (Xarelto) will make recommendation about when to restart after seeing brain MRI.   - Discontinued seizure precautions, bleed is not cortical  - liberalize neuro checks to q4 hours.  - Continue to maintain SBP <140mmHg    Will follow    Berenice Powers PA-C  Neurology  09/17/2020 7:37 AM  To page me or covering stroke neurology team member, click here: AMCOM   Choose \"On Call\" tab at top, then search dropdown box for \"Neurology Adult\", select location, press Enter, then look for stroke/neuro ICU/telestroke.    ______________________________________________________    Medications     Scheduled Meds    famotidine  20 mg Intravenous Q12H     venlafaxine  225 mg Oral QPM       Infusion Meds    - MEDICATION INSTRUCTIONS -       sodium chloride 75 mL/hr at 09/17/20 0500       PRN Meds  sodium chloride 0.9%, acetaminophen, acetaminophen, hydrALAZINE, hypromellose-dextran, labetalol, - MEDICATION INSTRUCTIONS -, metoclopramide **OR** metoclopramide, naloxone, ondansetron **OR** ondansetron, polyethylene glycol, potassium chloride, potassium chloride with lidocaine, potassium chloride, " potassium chloride, potassium chloride, prochlorperazine **OR** prochlorperazine **OR** prochlorperazine, senna-docusate **OR** senna-docusate       PHYSICAL EXAMINATION  Temp:  [96.5  F (35.8  C)-98.3  F (36.8  C)] 98.3  F (36.8  C)  Pulse:  [55-89] 67  Resp:  [8-39] 20  BP: ()/() 106/70  SpO2:  [80 %-98 %] 95 %     Neurologic  Mental Status:  alert, difficulty with orientation questions, thinks year is 2040 and knows he is in hospital but also states we're in a residential area, able to name objects well but generally very confabulatory without evidence of awareness of the present situation   Cranial Nerves:  PERRL, EOMI with normal smooth pursuit, facial movements symmetric, hearing not formally tested but intact to conversation, no dysarthria, tongue protrusion midline  Motor:  normal muscle tone and bulk, LUE resting high amplitude tremor, able to move all limbs spontaneously, no pronator drift  Reflexes:  toes down-going  Sensory:  light touch sensation intact and symmetric throughout upper and lower extremities  Coordination:  no obvious ataxia  Station/Gait:  deferred    Imaging  I personally reviewed all imaging; relevant findings per HPI.     Lab Results Data   CBC  Recent Labs   Lab 09/17/20  0431 09/16/20  1014   WBC 7.5 6.7   RBC 4.70 4.43   HGB 15.8 14.6   HCT 45.9 43.2    210     Basic Metabolic Panel    Recent Labs   Lab 09/17/20  0431 09/16/20  1014    140   POTASSIUM 3.9 4.5   CHLORIDE 105 107   CO2 23 31   BUN 15 19   CR 1.10 1.10   * 112*   BEATRIZ 8.6 8.7     Liver Panel  No results for input(s): PROTTOTAL, ALBUMIN, BILITOTAL, ALKPHOS, AST, ALT, BILIDIRECT in the last 168 hours.  INR    Recent Labs   Lab Test 09/17/20  0431 09/16/20  1511 09/16/20  1014   INR 1.24* 1.74* 1.59*      Lipid Profile  No lab results found.  A1C  No lab results found.  Troponin I  No results for input(s): TROPI in the last 168 hours.

## 2020-09-17 NOTE — PLAN OF CARE
Alert, orientation waxes and wanes, short term memory loss . Denies any pain or discomfort. Neuro's charted, see flowsheet. No overt deficits observed. Tele Afib+CVR. BP unremarkable. Afebrile. Up with therapies today, tolerated well. Impulsive and restless at times, needs frequent redirection/orientation. Voiding+, can be incontinent of bladder. Up with Strong 2 to BSC. Reg diet and tolerating well, No N/V. MRI done this afternoon. Okay to transfer to [address] [facility] when bed available. Nursing to follow closely.

## 2020-09-17 NOTE — PROGRESS NOTES
CM    I: SW received VM from Ariella Medica Care Coordinator for [facility], asking for a pt update. [phone]. Per Ariella, pt lives in [facility] as a Case Mix E, receiving the following services: Med Admin, housekeeping, laundry, meals, AM/PM cares w/bathing dressing, grooming and toileting-all as an assist of 1, if needed. Pt normally toilets self, but staff does check in with him periodically.  Contact at [facility] is: PRASHANTH Schaefer .     P: Continue to assist as needed.    LULU Bob   [facility]  [phone]

## 2020-09-17 NOTE — PLAN OF CARE
PT: Eval orders received, chart reviewed. Pt with other provider unable to complete eval this AM

## 2020-09-17 NOTE — PLAN OF CARE
"Discharge Planner OT   Patient plan for discharge: none stated  Current status: OT eval/tx initiated. Pt states he lives in an apt, not [facility]. Per chart, pt lives at long-term. Pt describes having A for dressing and bathing, states he uses a walker.     Pt oriented to self, month, day of the week, and year. Pt was able [place] but not able to verbalize being in the hospital. Pt hallucinating kittens and bugs throughout session.     Pt currently min-mod A for sup>sit. Demonstrated impaired sitting balance EOB w/ L side and posterior lean. Sat EOB w/ CGA-min-mod A. Pt required mod A x 2 for sit>supine.   Barriers to return to prior living situation: level of A  Recommendations for discharge: TCU  Rationale for recommendations: Pt below functional baseline w/ mobility and will benefit from continued therapy to improve ind/safety w/ ADLs and mobility.     If facility able to provide A x 2 for all mobility and ADL's, pt may be able to return home w/ home PT. Will continue to assess.       Entered by: Ariella Brownlee 09/17/2020 11:14 AM       "

## 2020-09-17 NOTE — PROGRESS NOTES
[facility]  Hospitalist Progress Note  Moody Potts MD  09/17/2020    Assessment & Plan   ASSESSMENT:  Mr. Maricarmen Lovell is a very pleasant 86-year-old gentleman with past medical history of hypertension, dyslipidemia, history of chronic atrial fibrillation on Xarelto, history of chronic kidney disease stage III, Alzheimer's dementia, depression, history of falls and history of CVA, who was brought in for evaluation status post a fall.  He was found to have an intraparenchymal hematoma within the right basal ganglia.      PLAN:   1.  Intracerebral hemorrhage.   2.  Status post fall.   -- there is a mechanical possible etiology as patient fell earlier on day of admission when he fell on his right knee and eventually hit his head on a cabinet in his apartment without loss of consciousness.   -- CT of the head without contrast in ER showed a small acute intraparenchymal hematoma within the right basal ganglia and prefrontal region inferior to the caudate and close with no significant mass effect.   -- does seem to have some underlying cognitive impairment that was documented in his chart.   -- patient awake, alert, oriented to self and partially to place and time  -- A repeat CT of the head in 6 hours showed a stable 1.1 cm long axis parenchyma hematoma at the anterior inferior medial aspect of the right basal ganglia with no evidence for new or increasing hemorrhage.  There is diffuse cerebral volume loss and cerebral white matter changes consistent with chronic small vessel ischemic disease. -- His INR in the ER was 1.59.  He received Kcentra in the ER.  --  Neuro Critical Care following   -- MRI showed bleed to be more subacute, possible chronic component, possible amyloid angiopathy and no obvious mass,   -- continue to hold his prior to admission Xarelto.   -- continue with frequent neuro checks every 2 hours but transfer out of ICU.  -- continue PT, OT and a formal Speech Language  Pathology.   3.  History of hypertension.   -- Not on any medications at home.    -- His blood pressure seems to be well controlled.   -- Given his intraparenchymal hemorrhage, systolic blood pressure goal should be below 140 mmHg as above.   4.  Dyslipidemia  -- not on any medications.   5.  History of chronic atrial fibrillation.  -- in atrial fibrillation with slow ventricular rate.  -- not on any beta blockers.  -- continue to hold his Xarelto pending neuro-critical care clearance.   6.  History of depression.   -- continue Effexor.   7.  Chronic kidney disease, stage III.   -- currently at baseline creatinine around 1.1.    8.  Pyuria.   -- His UA shows white blood cells of 12, large leukocyte esterase and few bacteria.  He denies any urinary symptoms.   -- no fever and no leukocytosis.  We will monitor him off antibiotics.    -- Urine cultures negative  9.  Deep venous thrombosis prophylaxis.   -- Hold his prior to admission Xarelto.   -- Pneumatic compression device has been ordered.      CODE STATUS:  Discussed with the patient and patient wishes to be DNR/DNI.      DISPOSITION:   -- anticipate > 2 days     Interval History   -- chart reviewed  -- MRI reviewed  -- neurology cleared to come out of ICU  -- noted hallucinations with OT and speech    -Data reviewed today: I reviewed all new labs and imaging over the last 24 hours. I personally reviewed no images or EKG's today.    Physical Exam    , Blood pressure (!) 107/91, pulse 71, temperature 98.3  F (36.8  C), temperature source Axillary, resp. rate 17, weight 113 kg (249 lb 1.9 oz), SpO2 96 %.  Vitals:    09/16/20 1015 09/17/20 0227   Weight: 113.4 kg (250 lb) 113 kg (249 lb 1.9 oz)     Vital Signs with Ranges  Temp:  [98.3  F (36.8  C)-99.1  F (37.3  C)] 98.3  F (36.8  C)  Pulse:  [63-89] 71  Resp:  [8-39] 17  BP: ()/() 107/91  SpO2:  [80 %-98 %] 96 %  I/O's Last 24 hours  I/O last 3 completed shifts:  In: 1702.5 [P.O.:760; I.V.:942.5]  Out:  1420 [Urine:1420]    Constitutional:   no apparent distress  Respiratory: Clear to auscultation bilaterally, no crackles or wheezing  Cardiovascular: irregular  GI: Normal bowel sounds, soft, non-distended, non-tender  Skin/Integumen: No rashes, no cyanosis, no edema  Other: Follows commands and moves all 4 ext    Medications   All medications were reviewed.    - MEDICATION INSTRUCTIONS -       sodium chloride 75 mL/hr at 09/17/20 0500       famotidine  20 mg Intravenous Q12H     venlafaxine  225 mg Oral QPM        Data   Recent Labs   Lab 09/17/20  0431 09/16/20  1511 09/16/20  1014   WBC 7.5  --  6.7   HGB 15.8  --  14.6   MCV 98  --  98     --  210   INR 1.24* 1.74* 1.59*     --  140   POTASSIUM 3.9  --  4.5   CHLORIDE 105  --  107   CO2 23  --  31   BUN 15  --  19   CR 1.10  --  1.10   ANIONGAP 8  --  2*   BEATRIZ 8.6  --  8.7   *  --  112*       Recent Results (from the past 24 hour(s))   CTA Head with Contrast    Narrative    CT ANGIOGRAM OF THE HEAD WITHOUT AND WITH CONTRAST  9/16/2020 9:04 PM     COMPARISON: None.    HISTORY: Right basal ganglia IPH.    TECHNIQUE: Precontrast localizing scans were followed by CT  angiography with an injection of 70mL Isovue-370 nonionic intravenous  contrast material with scans through the head. Images were transferred  to a separate 3-D workstation where multiplanar reformations and 3-D  images were created.      FINDINGS: The basilar, bilateral intracranial distal internal carotid,  bilateral anterior cerebral, bilateral middle cerebral and bilateral  posterior cerebral arteries are patent and unremarkable. The anterior  communicating and bilateral posterior communicating arteries are  patent.    Incidental note is made of a 4 mm enhancing extra-axial nodule at the  anterior medial aspect of the right middle cranial fossa consistent  with a small meningioma.      Impression    IMPRESSION: Normal head CTA. Specifically, no evidence for vascular  abnormality  to explain the right basal ganglia hemorrhage.    Radiation dose for this scan was reduced using automated exposure  control, adjustment of the mA and/or kV according to patient size, or  iterative reconstruction technique    NEREIDA LINN MD   MR Brain w/o & w Contrast    Narrative    MRI BRAIN WITHOUT AND WITH CONTRAST  9/17/2020 2:09 PM     HISTORY: Recent intracranial hemorrhage.    TECHNIQUE: Multiplanar, multisequence MRI of the brain without and  with 10 mL Gadavist.     COMPARISON: Head CT 9/16/2020.     FINDINGS: Intraparenchymal hemorrhage is again seen in the right basal  ganglia, the hemorrhage is intrinsically T1 hyperintense and T2  hypointense suggesting that it is subacute in age. Mild surrounding T2  hyperintense edema. No associated enhancement is appreciated noting  that evaluation is slightly difficult given the intrinsic T1 signal of  the hemorrhage. The hemorrhage demonstrate susceptibility  hypointensity.    There are several additional foci of susceptibility hypointensity  including areas in the bilateral frontal lobes, cortical areas in the  left parietal lobe, and foci in the left temporal lobe and probably  right occipital lobe.    No restricted diffusion to suggest acute infarct. No significant mass  effect or midline shift. Moderate diffuse parenchymal volume loss.  Patchy periventricular white matter T2 hyperintensities are  nonspecific, but likely related to chronic microvascular ischemic  disease.    Linear enhancing structure in the right frontal lobe is compatible  with a developmental venous anomaly.    Extra-axial enhancing 5 mm lesion along the right clinoid process  (series 1301 image 10) is most compatible with a meningioma.    The facial structures appear normal.       Impression    IMPRESSION:    1. Intraparenchymal hemorrhage in the anterior right basal ganglia  with intrinsic signal suggesting that it is subacute in age. Mild  surrounding edema without significant mass  effect or midline shift. No  definite underlying enhancement to suggest underlying mass although  the intrinsic T1 signal makes evaluation slightly difficult. Recommend  continued follow-up.  2. Several areas in both cerebral hemispheres suggesting sequela of  previous intraparenchymal hemorrhages including a subarachnoid  hemorrhage in the left parietal lobe. Findings raise concern for  possible cerebral amyloid angiopathy.  3. Moderate diffuse parenchymal volume loss and white matter changes  likely due to chronic microvascular ischemic disease.  4. Small extra-axial enhancing 5 mm lesion along the right clinoid  process most compatible with a meningioma.  5. 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      MD Moody CHAVEZ MD  Text Page  (7am to 6pm)

## 2020-09-17 NOTE — PLAN OF CARE
"Discharge Planner SLP   Patient plan for discharge: Did not state  Current status: SLP: Evaluation completed with breakfast tray. Pt alert, pleasantly confused and appeared to be hallucinating with \"creatures\" and cats in the room. Munching behavior noted with dumont and consistency was quite stringy that required prolonged mastication. Dumnot removed from tray. Improved chewing with [place] toast. No overt s/sx of aspiration with solids or sips of thin liquids. Suspect baseline aspiration risk associated with cognitive impairment and verbose/distractible behaviors. Pt appeared to do better when SLP stepped out of pt's view, so he wasn't distracted.     Okay to continue regular diet and thin liquids with pt upright for all intake. Set up assist/chopping foods or finger foods needed due to difficulty sequencing fork/knife. Avoid distractions/talking/TV during meal times.     Barriers to return to prior living situation: None from SLP  Recommendations for discharge: Return to LTC  Rationale for recommendations: No obvious speech/language/naming impairment in conversation. Suspect pt is at baseline from cognitive and swallow standpoint. No further IP SLP services are indicated at this time. Will complete orders.        Entered by: Cristela Lopez 09/17/2020 10:43 AM       "

## 2020-09-17 NOTE — PLAN OF CARE
Neuros intact ex intermittent confusion. Room air. LS clear. Tele afib. BP in desired range. BS active. Voiding without issue. Incontinent. 1 PIV SL. Lj pain. Skin intact. Very restless. Transfer to station 73 this shift.

## 2020-09-17 NOTE — PROGRESS NOTES
Patient has been increasingly agitated in bed fixated on going to the bathroom, tried urinal with no results. Tried sitting him up on commode. Up with two and gait belt. Sitter is right next to him.

## 2020-09-17 NOTE — PROGRESS NOTES
NEURO: Intraparenchymal hematoma due to fall at home on 9/16/20. Currently neuro checks Q2H. Dementia with sundowners.    RESPIRATORY: LS cta. O2 sat 92-97% on room air.    CARDIOVASCULAR: Chronic afib. asymtomatic hypotension.    GASTROINTESTINAL: WNL.    GENITOURINARY: WNL.    SKIN: Protective mepilex in place on coccyx.    PLAN OF CARE: consider changing neuro checks from Q2h to Q4H and transferring to station 73. Patient does need a sitter at night for fall precautions, forgetful and impulsively gets up out of bed.

## 2020-09-17 NOTE — PROGRESS NOTES
"   09/17/20 1045   Quick Adds   Type of Visit Initial Occupational Therapy Evaluation   Living Environment   Living Environment Comment OT eval/tx initiated. Pt states he lives in an apt, not [facility]. Per chart, pt lives at [facility]. Pt describes having A for dressing and bathing, states he uses a walker.    Self-Care   Activity/Exercise/Self-Care Comment walker and w/c per patient   Functional Level   Prior Functional Level Comment A w/ ADLs   General Information   Onset of Illness/Injury or Date of Surgery - Date 09/16/20   Referring Physician Karmen Pederson MD    Patient/Family Goals Statement none specifically stated   Additional Occupational Profile Info/Pertinent History of Current Problem  R basal ganglia intraparenchymal hemorrhage, ICH score 1, location suggestive of nontraumatic or spontaneous hemorrhage (possibly associated with warfarin coagulopathy) however patient did have mechanical fall at onset so cannot rule out traumatic. However poor historian due to memory loss   Precautions/Limitations fall precautions   Cognitive Status Examination   Level of Consciousness alert   Cognitive Comment Pt oriented to self, month, day of the week, and year. Pt was able [place] but not able to verbalize being in the hospital. Pt hallucinating kittens and bugs throughout session.  Pt w/ dementia diagnosis   Visual Perception   Visual Perception Wears glasses   Sensory Examination   Sensory Comments denies any numbness/tingling   Pain Assessment   Patient Currently in Pain No   Range of Motion (ROM)   ROM Comment BUE AROM WNL   Strength   Strength Comments BUE strength WFL, slightly less in LUE   Hand Strength   Hand Strength Comments WFL    Coordination   Coordination Comments has tremors, some difficulty w/ serial finger to thumb opposition   Mobility   Bed Mobility Comments min A sup>sit. Mod A x 2 for sit>supine   Balance   Balance Comments impaired sitting balance   Bathing " "  Level of Barnes maximum assist (25% patients effort)   Upper Body Dressing   Level of Barnes: Dress Upper Body moderate assist (50% patients effort)   Lower Body Dressing   Level of Barnes: Dress Lower Body maximum assist (25% patients effort)   Toileting   Level of Barnes: Toilet maximum assist (25% patients effort)   Grooming   Level of Barnes: Grooming moderate assist (50% patients effort)   Eating/Self Feeding   Level of Barnes: Eating minimum assist (75% patients effort)   Activities of Daily Living Analysis   Impairments Contributing to Impaired Activities of Daily Living balance impaired;cognition impaired;ROM decreased;strength decreased  (dec act tolerance)   General Therapy Interventions   Planned Therapy Interventions ADL retraining;progressive activity/exercise   Clinical Impression   Criteria for Skilled Therapeutic Interventions Met yes, treatment indicated   OT Diagnosis dec  ind/safety w/ ADL's and mobility   Influenced by the following impairments dec balance, functional act tolerance, cognition, strength   Assessment of Occupational Performance 1-3 Performance Deficits   Identified Performance Deficits dec ind/safety w/ toileting, g/h, functional transfers/mobility   Clinical Decision Making (Complexity) Low complexity   Therapy Frequency 4x/week   Predicted Duration of Therapy Intervention (days/wks) 6 days   Anticipated Discharge Disposition Transitional Care Facility  (vs return to [facility] w/ Ax2)   Risks and Benefits of Treatment have been explained. Yes   Patient, Family & other staff in agreement with plan of care Yes   [facility] "[employer]PAC TM \"6 Clicks\"   2016, Trustees of [facility], under license to [employer].  All rights reserved.   6 Clicks Short Forms Daily Activity Inpatient Short Form   [facility] AM-PAC  \"6 Clicks\" Daily Activity Inpatient Short Form   1. Putting on and taking off regular lower body clothing? 2 - A Lot   2. " Bathing (including washing, rinsing, drying)? 2 - A Lot   3. Toileting, which includes using toilet, bedpan or urinal? 2 - A Lot   4. Putting on and taking off regular upper body clothing? 3 - A Little   5. Taking care of personal grooming such as brushing teeth? 3 - A Little   6. Eating meals? 3 - A Little   Daily Activity Raw Score (Score out of 24.Lower scores equate to lower levels of function) 15   Total Evaluation Time   Total Evaluation Time (Minutes) 8

## 2020-09-17 NOTE — H&P
Admitted:     09/16/2020      PRIMARY CARE PHYSICIAN:  [facility].      CHIEF COMPLAINT:  Fall.      HISTORY OF PRESENT ILLNESS:  Had been obtained from the patient who is a relatively good historian, although he does have some underlying dementia.  I did discuss with the ER attending, Dr. Jones, and I reviewed his chart as well.      Mr. Maricarmen Lovell is a very pleasant 86-year-old gentleman with a past medical history of hypertension, dyslipidemia, history of atrial fibrillation on Xarelto, Alzheimer's dementia, chronic kidney disease stage III, and depression who was brought in for evaluation status post a fall.  Apparently the patient lives in an assisted living facility.  He told me that at baseline he uses a walker or a cane for ambulation outside of his apartment, but in his apartment he just holds onto the furniture when he walks around.  He states that earlier today, while he was coming back from the bathroom, he turned around and he fell.  It seems that this was a mechanical fall.  He is not sure what happened, but he denies having any preceding symptoms such as dizziness or palpitations.  He did not lose his consciousness.  He fell on his right knee and then he hit his head on a cabinet.  911 was called and he was brought to the ER for further evaluation.      Upon further questioning, he denies any current headache.  He denies any nausea, vomiting or abdominal pain.  He denies any chest pain or palpitations.  He denies recent fevers, no coughing.  He does report having some exertional shortness of breath.  He denies any dysuria, no diarrhea, no constipation, no leg swelling.  He denies any numbness or weakness in his arms or legs.      In the ER he was seen by Dr. Jones.  His initial vitals showed a blood pressure of 118/67, his heart rate was 76, respiratory rate 18, oxygen saturation 95% on room air and temperature 96.5.  He did have some basic blood work, which showed unremarkable  CBC with white blood cells of 6.7, hemoglobin 14.6, hematocrit 43.2 and platelet count 210.  His BMP with sodium of 140, potassium 4.5, chloride 107, bicarbonate 31, BUN 19 and creatinine 1.1.  His calcium was 8.2.  His initial INR was 1.59.  He was tested negative for COVID.  His EKG showed atrial fibrillation with a slow ventricular rate at 57 beats per minute, no ischemic changes.  He had a CT of the head without contrast in the ER and it showed a small acute intraparenchymal hematoma within the right basal ganglia/prefrontal region inferior to the caudate nucleus.  ER attending discussed with Neurology on-call.  He was given Kcentra in the ER and he is admitted to ICU.  Neurology recommended to repeat a CAT scan of the head in 6 hours and have a CT angiogram of the head.  Also, systolic blood pressure goal should be below 140.      I saw the patient after he arrived in ICU.  He was awake, alert.  He was oriented to self and partially to place and time; he said it is 09/19/2020.  He knew he was in the hospital.  He was able to describe the events earlier today that led to his fall.  He does seem to have a tremor of his extremities, which is chronic as per the patient after he had a CVA in the past.      PAST MEDICAL HISTORY:   1.  Hypertension.   2.  Dyslipidemia.   3.  History of chronic atrial fibrillation, on Xarelto.   4.  Alzheimer's dementia.   5.  History of chronic kidney disease, stage III, with baseline creatinine around 1.1.   6.  Depression.   7.  History of CVA as per the patient.      PAST SURGICAL HISTORY:      TONSILLECTOMY          CATARACT REMOVAL 2012 Bilateral byrne     CAPSULOTOMY 2013 Bilateral BE            SOCIAL HISTORY:  The patient never smoked.  He reports that he used to drink once or twice on the weekend, but he states that he did not drink anything since the pandemic started.  He denies illicit drug abuse.      FAMILY HISTORY:  Reviewed with the patient and is noncontributory to the  current admission.      PRIOR TO ADMISSION MEDICATIONS:   acetaminophen (TYLENOL) 325 MG tablet Take 650 mg by mouth every 4 hours as needed for mild pain prn at prn Yes Unknown, Entered By History   hypromellose (ARTIFICIAL TEARS) 0.5 % SOLN ophthalmic solution Place 1 drop into both eyes every hour as needed for dry eyes prn at prn Yes Unknown, Entered By History   melatonin 3 MG tablet Take 1 mg by mouth At Bedtime 9/15/2020 at 2000 Yes Unknown, Entered By History   polyethylene glycol (MIRALAX) 17 g packet Take 1 packet by mouth daily as needed for constipation prn at prn Yes Unknown, Entered By History   psyllium (METAMUCIL/KONSYL) Packet Take 1 packet by mouth daily as needed for constipation prn at prn Yes Unknown, Entered By History   rivaroxaban ANTICOAGULANT (XARELTO) 15 MG TABS tablet Take 15 mg by mouth every morning 9/16/2020 at 0800 Yes Unknown, Entered By History   senna-docusate (SENNA S) 8.6-50 MG tablet Take 1 tablet by mouth 2 times daily 9/16/2020 at 0800 Yes Unknown, Entered By History   venlafaxine (EFFEXOR-ER) 225 MG 24 hr tablet Take 225 mg by mouth daily 9/15/2020 at 2000 Yes Unknown, Entered By History   vitamin D3 (CHOLECALCIFEROL) 50 mcg (2000 units) tablet Take 1 tablet by mouth daily 9/16/2020 at 0800 Yes Unknown, Entered By History             ALLERGIES:    Allergies   Allergen Reactions     Pecan [Nuts] Anaphylaxis     Fluoxetine Nausea        REVIEW OF SYSTEMS:  A 10-point review of systems was conducted and it was negative except for pertinent positives mentioned in history of present illness.      PHYSICAL EXAMINATION:   VITAL SIGNS:  Blood pressure is 124/79, heart rate 64, respiratory rate 12, oxygen saturation 98% on room air, temperature 98.2.   GENERAL:  The patient is awake, alert, in no acute distress at the time of my examination.   HEENT:  Head is normocephalic.  He has a small scalp hematoma over the top of his head.  Otherwise, pupils are equally round and reactive to  light.  Oral mucosa is moist.   NECK:  Supple.  No cervical lymphadenopathy, no thyromegaly.   CHEST:  There is bilateral air entry, no wheezing, no rales, no crackles.   CARDIOVASCULAR:  There is normal S1 and S2, slightly irregular.  No murmurs, no rubs.   ABDOMEN:  Soft, nontender, nondistended.  Bowel sounds are present.   EXTREMITIES:  There is no leg swelling, no calf tenderness, 2+ peripheral pulses are palpable.   SKIN:  Intact.  There is a small skin abrasion over his right knee, but no skin laceration.  No rashes, no cyanosis.   NEUROLOGIC:  The patient is awake, alert, oriented to self and partially to place and time.  I think this is his baseline.  He does have some fine tremor of extremities.  Again, it seems at baseline as per the patient.  There are no new focal neurological deficits.   PSYCHIATRIC EVALUATION:  Normal mood, normal affect.   MUSCULOSKELETAL:  He moves all extremities freely.  There are no obvious joint deformities.      LABORATORY DATA:  Labs were reviewed and dictated above.      ASSESSMENT:  Mr. Maricarmen Lovell is a very pleasant 86-year-old gentleman with past medical history of hypertension, dyslipidemia, history of chronic atrial fibrillation on Xarelto, history of chronic kidney disease stage III, Alzheimer's dementia, depression, history of falls and history of CVA, who was brought in for evaluation status post a fall.  He was found to have an intraparenchymal hematoma within the right basal ganglia.      PLAN:   1.  Intracerebral hemorrhage.   2.  Status post fall.   It seems that the patient had a mechanical fall earlier today when he fell on his right knee and eventually hit his head on a cabinet in his apartment.  He did not lose his consciousness.  He is on Xarelto.  A CT of the head without contrast in ER showed a small acute intraparenchymal hematoma within the right basal ganglia and prefrontal region inferior to the caudate and close with no significant mass effect.  He  does seem to have some underlying cognitive impairment that was documented in his chart.  He was awake, alert, oriented to self and partially to place and time.  I am not sure what his baseline is, but he seems to be close to his baseline.  A repeat CT of the head in 6 hours showed a stable 1.1 cm long axis parenchyma hematoma at the anterior inferior medial aspect of the right basal ganglia with no evidence for new or increasing hemorrhage.  There is diffuse cerebral volume loss and cerebral white matter changes consistent with chronic small vessel ischemic disease.  His INR in the ER was 1.59.  He received Kcentra in the ER.  Neuro Critical Care was contacted.  He is admitted to ICU for close monitoring.  We will hold his prior to admission Xarelto.  We will continue with frequent neuro checks every 2 hours for tonight.  Neuro Critical Care consult requested.  They recommended to have a CT angiogram of the head with IV contrast tonight.  This is pending.  We will repeat labs in the morning.  Systolic blood pressure goal is below 140 mmHg.  Hydralazine IV p.r.n. and labetalol IV p.r.n. has been ordered.  He passed a bedside dysphagia screen, so we will let him have a regular diet for now.  PT, OT and a formal Speech Language Pathology consult requested.   3.  History of hypertension.  Not on any medications at home.  His blood pressure seems to be well controlled.  Given his intraparenchymal hemorrhage, systolic blood pressure goal should be below 140 mmHg as above.   4.  Dyslipidemia, not on any medications.   5.  History of chronic atrial fibrillation.  He seems to be in atrial fibrillation with slow ventricular rate.  He is not on any beta blockers.  We have to hold his Xarelto for now given his intraparenchymal hemorrhage.  Continue with monitoring on telemetry.   6.  History of depression.  We will resume his prior to admission Effexor.   7.  Chronic kidney disease, stage III.  Baseline creatinine seems to be  around 1.1.  His creatinine in the ER was is 1.1, at his baseline.  We will closely monitor his kidney function.   8.  Pyuria.  His UA shows white blood cells of 12, large leukocyte esterase and few bacteria.  He denies any urinary symptoms.  There is no fever and no leukocytosis.  We will monitor him off antibiotics.  We will follow-up urine cultures for now.   9.  Deep venous thrombosis prophylaxis.  Hold his prior to admission Xarelto.  Pneumatic compression device has been ordered.      CODE STATUS:  Discussed with the patient and patient wishes to be DNR/DNI.      DISPOSITION:  Admit to inpatient.  I anticipate a couple of days of hospitalization.         DELTA LOPEZ MD             D: 2020   T: 2020   MT: DELMA      Name:     NADIYA LARES   MRN:      [id number]        Account:      [id number]   :      1934        Admitted:     2020                   Document: [id number]       cc: [facility]

## 2020-09-18 ENCOUNTER — APPOINTMENT (OUTPATIENT)
Dept: PHYSICAL THERAPY | Facility: CLINIC | Age: 85
DRG: 085 | End: 2020-09-18
Attending: INTERNAL MEDICINE
Payer: COMMERCIAL

## 2020-09-18 ENCOUNTER — HOSPITAL ENCOUNTER (OUTPATIENT)
Dept: NEUROLOGY | Facility: CLINIC | Age: 85
DRG: 085 | End: 2020-09-18
Attending: PHYSICIAN ASSISTANT
Payer: COMMERCIAL

## 2020-09-18 ENCOUNTER — APPOINTMENT (OUTPATIENT)
Dept: OCCUPATIONAL THERAPY | Facility: CLINIC | Age: 85
DRG: 085 | End: 2020-09-18
Attending: PHYSICIAN ASSISTANT
Payer: COMMERCIAL

## 2020-09-18 LAB
ANION GAP SERPL CALCULATED.3IONS-SCNC: 2 MMOL/L (ref 3–14)
BUN SERPL-MCNC: 15 MG/DL (ref 7–30)
CALCIUM SERPL-MCNC: 8.6 MG/DL (ref 8.5–10.1)
CHLORIDE SERPL-SCNC: 108 MMOL/L (ref 94–109)
CHOLEST SERPL-MCNC: 180 MG/DL
CO2 SERPL-SCNC: 30 MMOL/L (ref 20–32)
CREAT SERPL-MCNC: 1 MG/DL (ref 0.66–1.25)
ERYTHROCYTE [DISTWIDTH] IN BLOOD BY AUTOMATED COUNT: 12.4 % (ref 10–15)
GFR SERPL CREATININE-BSD FRML MDRD: 68 ML/MIN/{1.73_M2}
GLUCOSE BLDC GLUCOMTR-MCNC: 107 MG/DL (ref 70–99)
GLUCOSE BLDC GLUCOMTR-MCNC: 109 MG/DL (ref 70–99)
GLUCOSE BLDC GLUCOMTR-MCNC: 166 MG/DL (ref 70–99)
GLUCOSE BLDC GLUCOMTR-MCNC: 95 MG/DL (ref 70–99)
GLUCOSE BLDC GLUCOMTR-MCNC: 98 MG/DL (ref 70–99)
GLUCOSE SERPL-MCNC: 100 MG/DL (ref 70–99)
HCT VFR BLD AUTO: 45.6 % (ref 40–53)
HDLC SERPL-MCNC: 40 MG/DL
HGB BLD-MCNC: 15.7 G/DL (ref 13.3–17.7)
LDLC SERPL CALC-MCNC: 125 MG/DL
MAGNESIUM SERPL-MCNC: 2.1 MG/DL (ref 1.6–2.3)
MCH RBC QN AUTO: 33.5 PG (ref 26.5–33)
MCHC RBC AUTO-ENTMCNC: 34.4 G/DL (ref 31.5–36.5)
MCV RBC AUTO: 97 FL (ref 78–100)
NONHDLC SERPL-MCNC: 140 MG/DL
PHOSPHATE SERPL-MCNC: 3.3 MG/DL (ref 2.5–4.5)
PLATELET # BLD AUTO: 213 10E9/L (ref 150–450)
POTASSIUM SERPL-SCNC: 4.3 MMOL/L (ref 3.4–5.3)
RBC # BLD AUTO: 4.68 10E12/L (ref 4.4–5.9)
SODIUM SERPL-SCNC: 140 MMOL/L (ref 133–144)
TRIGL SERPL-MCNC: 74 MG/DL
WBC # BLD AUTO: 7.5 10E9/L (ref 4–11)

## 2020-09-18 PROCEDURE — 85027 COMPLETE CBC AUTOMATED: CPT | Performed by: INTERNAL MEDICINE

## 2020-09-18 PROCEDURE — 97530 THERAPEUTIC ACTIVITIES: CPT | Mod: GP | Performed by: PHYSICAL THERAPIST

## 2020-09-18 PROCEDURE — 12000000 ZZH R&B MED SURG/OB

## 2020-09-18 PROCEDURE — 83735 ASSAY OF MAGNESIUM: CPT | Performed by: INTERNAL MEDICINE

## 2020-09-18 PROCEDURE — 97535 SELF CARE MNGMENT TRAINING: CPT | Mod: GO

## 2020-09-18 PROCEDURE — 25000132 ZZH RX MED GY IP 250 OP 250 PS 637: Performed by: INTERNAL MEDICINE

## 2020-09-18 PROCEDURE — 84100 ASSAY OF PHOSPHORUS: CPT | Performed by: INTERNAL MEDICINE

## 2020-09-18 PROCEDURE — 95714 VEEG EA 12-26 HR UNMNTR: CPT

## 2020-09-18 PROCEDURE — 97161 PT EVAL LOW COMPLEX 20 MIN: CPT | Mod: GP | Performed by: PHYSICAL THERAPIST

## 2020-09-18 PROCEDURE — 25000125 ZZHC RX 250: Performed by: INTERNAL MEDICINE

## 2020-09-18 PROCEDURE — 00000146 ZZHCL STATISTIC GLUCOSE BY METER IP

## 2020-09-18 PROCEDURE — 80061 LIPID PANEL: CPT | Performed by: INTERNAL MEDICINE

## 2020-09-18 PROCEDURE — 80061 LIPID PANEL: CPT | Performed by: PHYSICIAN ASSISTANT

## 2020-09-18 PROCEDURE — 99291 CRITICAL CARE FIRST HOUR: CPT | Performed by: PSYCHIATRY & NEUROLOGY

## 2020-09-18 PROCEDURE — 80048 BASIC METABOLIC PNL TOTAL CA: CPT | Performed by: INTERNAL MEDICINE

## 2020-09-18 PROCEDURE — 99232 SBSQ HOSP IP/OBS MODERATE 35: CPT | Performed by: INTERNAL MEDICINE

## 2020-09-18 RX ADMIN — VENLAFAXINE HYDROCHLORIDE 225 MG: 75 CAPSULE, EXTENDED RELEASE ORAL at 19:24

## 2020-09-18 RX ADMIN — FAMOTIDINE 20 MG: 10 INJECTION, SOLUTION INTRAVENOUS at 19:24

## 2020-09-18 RX ADMIN — FAMOTIDINE 20 MG: 10 INJECTION, SOLUTION INTRAVENOUS at 08:44

## 2020-09-18 ASSESSMENT — ACTIVITIES OF DAILY LIVING (ADL)
ADLS_ACUITY_SCORE: 28
ADLS_ACUITY_SCORE: 28
ADLS_ACUITY_SCORE: 27
ADLS_ACUITY_SCORE: 27
ADLS_ACUITY_SCORE: 28
ADLS_ACUITY_SCORE: 27

## 2020-09-18 NOTE — PLAN OF CARE
Nursing shift note  ICU transfer around evening.Patient here with fall and Intraparenchymal hemorrhage in Rt basal ganglia.Neuro's intact except confusion and restlessness tremors noted at times  .Tele Afib with CVR.C/o of frequent cough.On bed rest up with 2,GB walker or lift voided adequately can be incontinent too.On Regular diet.Pending discharge to TCU.        Behavior & Aggression Tool color:yellow      Pt's belongings:   (Belongings from admission day present in pt's room)  glass,body jewelery,cloths and shoe              Home medications:  (N/A)

## 2020-09-18 NOTE — PROGRESS NOTES
LTV  Day 1 started @ 7349. Ordered by Claudia Powers. Explained procedure to patient prior to hook up. Video Observation initiated, patient informed. Claudia Powers stated video and audio were medically necessary for testing    Roselyn Skinner

## 2020-09-18 NOTE — PLAN OF CARE
Discharge Planner OT   Patient plan for discharge: not stated  Current status: Upon arrival pt sitting upright in bed and being fed lunch. Pt self fed a portion of his lunch with modA using fork and with Maurice for finger food. Semi-supine to sit with maxA of 2. Sat EOB with Maurice to CGA. Assist to scoot closer to EOB to place feet on floor. Attempted to have pt stand and pivot to chair but poor command following. Sit to supine with maxA of 2. Assist of 2 to boost in bed. Pt very alert during session. Responding to simple questions with 50% accuracy. Pt made 2 jokes and was rhyming.  Discussed recommendation for pt to self-feed finger food at dinner with Maurice. Pt left with long arm sitter.  Barriers to return to prior living situation: level of A for ADLs and mobility, cognitive impairment, waxing and waning alertness  Recommendations for discharge: TCU  Rationale for recommendations: Pt below functional baseline w/ self-cares and will benefit from continued therapy to improve ind/safety w/ ADLs and mobility. If facility able to provide A x 2 for all mobility and ADL's, pt may be able to return home w/ home PT. Will continue to assess.       Entered by: Andree Brian 09/18/2020 2:28 PM

## 2020-09-18 NOTE — PROGRESS NOTES
"  [facility]    Stroke Progress Note    Interval Events  Significantly somnolent today. BP in 110-150 range overnight.     Impression  1. R basal ganglia intraparenchymal hemorrhage, ICH score 1, location suggestive of nontraumatic or spontaneous hemorrhage (possibly associated with warfarin coagulopathy) however patient did have mechanical fall at onset so cannot rule out traumatic. However poor historian due to memory loss. Given brain MRI findings most consistent with bleed due to cerebral amyloid angiopathy  2. Dementia    Plan  - Given the MRI findings, recommend stopping anticoagulation permanently. Will discuss this with his family today.--- attempted to reach sister Cassi, no answer  - Neuro checks q4 hours.  - Continue to maintain SBP <160mmHg for now, but long term goal is 120/80 (to be achieved within several weeks)  - Check EEG given ongoing waxing/waning mental status    Will follow    Berenice Powers PA-C  Neurology  09/18/2020 8:09 AM  To page me or covering stroke neurology team member, click here: AMCOM   Choose \"On Call\" tab at top, then search dropdown box for \"Neurology Adult\", select location, press Enter, then look for stroke/neuro ICU/telestroke.    ______________________________________________________    Medications     Scheduled Meds    famotidine  20 mg Intravenous Q12H     venlafaxine  225 mg Oral QPM       Infusion Meds    - MEDICATION INSTRUCTIONS -       sodium chloride 75 mL/hr at 09/18/20 0400       PRN Meds  sodium chloride 0.9%, acetaminophen, acetaminophen, hydrALAZINE, hypromellose-dextran, labetalol, - MEDICATION INSTRUCTIONS -, metoclopramide **OR** metoclopramide, naloxone, ondansetron **OR** ondansetron, polyethylene glycol, potassium chloride, potassium chloride with lidocaine, potassium chloride, potassium chloride, potassium chloride, prochlorperazine **OR** prochlorperazine **OR** prochlorperazine, senna-docusate **OR** senna-docusate       PHYSICAL " EXAMINATION  Temp:  [97.4  F (36.3  C)-98.4  F (36.9  C)] 98.1  F (36.7  C)  Pulse:  [67-84] 68  Resp:  [10-20] 16  BP: (102-155)/(58-91) 135/81  SpO2:  [87 %-97 %] 94 %     Neurologic  Mental Status:  very drowsy, does not follow any commands except to stick out tongue, does not answer questions  Cranial Nerves:  PERRL, EOMI with normal smooth pursuit, facial movements symmetric, hearing not formally tested but intact to  shout, no dysarthria, tongue protrusion midline  Motor:  normal muscle tone and bulk, LUE resting high amplitude tremor, able to move all limbs spontaneously, no pronator drift  Reflexes:  toes down-going  Sensory: intact to noxious  Coordination:  no obvious ataxia  Station/Gait:  deferred    Imaging  I personally reviewed all imaging    MRI brain with and without contrast:  IMPRESSION:    1. Intraparenchymal hemorrhage in the anterior right basal ganglia  with intrinsic signal suggesting that it is subacute in age. Mild  surrounding edema without significant mass effect or midline shift. No  definite underlying enhancement to suggest underlying mass although  the intrinsic T1 signal makes evaluation slightly difficult. Recommend  continued follow-up.  2. Several areas in both cerebral hemispheres suggesting sequela of  previous intraparenchymal hemorrhages including a subarachnoid  hemorrhage in the left parietal lobe. Findings raise concern for  possible cerebral amyloid angiopathy.  3. Moderate diffuse parenchymal volume loss and white matter changes  likely due to chronic microvascular ischemic disease.  4. Small extra-axial enhancing 5 mm lesion along the right clinoid  process most compatible with a meningioma.  5. 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    Lab Results Data   CBC  Recent Labs   Lab 09/17/20  0431 09/16/20  1014   WBC 7.5 6.7   RBC 4.70 4.43   HGB 15.8 14.6   HCT 45.9 43.2    210     Basic Metabolic Panel    Recent Labs   Lab 09/17/20  0431  09/16/20  1014    140   POTASSIUM 3.9 4.5   CHLORIDE 105 107   CO2 23 31   BUN 15 19   CR 1.10 1.10   * 112*   BEATRIZ 8.6 8.7     Liver Panel  No results for input(s): PROTTOTAL, ALBUMIN, BILITOTAL, ALKPHOS, AST, ALT, BILIDIRECT in the last 168 hours.  INR    Recent Labs   Lab Test 09/17/20  0431 09/16/20  1511 09/16/20  1014   INR 1.24* 1.74* 1.59*      Lipid Profile  No lab results found.  A1C  No lab results found.  Troponin I  No results for input(s): TROPI in the last 168 hours.

## 2020-09-18 NOTE — PROGRESS NOTES
"   20 1135   Quick Adds   Type of Visit Initial PT Evaluation   Living Environment   Lives With facility resident   Living Arrangements assisted living   Home Accessibility no concerns   Living Environment Comment Per  note \" pt lives in [facility] as a Case Mix E, receiving the following services: Med Admin, housekeeping, laundry, meals, AM/PM cares w/bathing dressing, grooming and toileting-all as an assist of 1, if needed. Pt normally toilets self, but staff does check in with him periodically.\"   Self-Care   Current Activity Tolerance poor  (Unable to keep patient awake. )   Activity/Exercise/Self-Care Comment Patient unable to answer above questions. Per chart he has a walker and a w/c    Functional Level Prior   Ambulation 1-->assistive equipment   Transferring 1-->assistive equipment   Toileting 1-->assistive equipment   Bathing 3-->assistive equipment and person   Communication 0-->understands/communicates without difficulty   Cognition 1 - attention or memory deficits   Prior Functional Level Comment Per SW note patient gets am and pm cares.    General Information   Onset of Illness/Injury or Date of Surgery - Date 20   Referring Physician Moody Potts MD    Patient/Family Goals Statement Didn;t state   Pertinent History of Current Problem (include personal factors and/or comorbidities that impact the POC)  Mr. Maricarmen Lovell is a very pleasant 86-year-old gentleman with past medical history of hypertension, dyslipidemia, history of chronic atrial fibrillation on Xarelto, history of chronic kidney disease stage III, Alzheimer's dementia, depression, history of falls and history of CVA, who was brought in for evaluation status post a fall.  He was found to have an intraparenchymal hematoma within the right basal ganglia.    Precautions/Limitations fall precautions   Cognitive Status Examination   Orientation person  (Knows name and . ) "   Level of Consciousness lethargic/somnolent  (Difficulty keeping his eyes open even in sitting. )   Follows Commands and Answers Questions 25% of the time  (Needed extra cues and prompting. )   Personal Safety and Judgment impaired   Memory impaired   Pain Assessment   Patient Currently in Pain No   Integumentary/Edema   Integumentary/Edema no deficits were identifed   Posture    Posture Forward head position;Protracted shoulders   Range of Motion (ROM)   ROM Comment No specific deficits noted with functional mobility but UE's not formally assessed.    Strength   Strength Comments Patient not following commands well enough to formally test. Needed max assist to move LE's to the edge of the bed.    Bed Mobility   Bed Mobility Comments Max assist of 2 for sup to/from sit.    Transfer Skills   Transfer Comments Unable to assess as patient too drowsy.    Balance   Balance Comments Can maintain sitting balance without assist but tends to lean backwards.    Sensory Examination   Sensory Perception Comments Unable to assess as not following commands well enough.    General Therapy Interventions   Planned Therapy Interventions balance training;bed mobility training;gait training;transfer training   Clinical Impression   Criteria for Skilled Therapeutic Intervention yes, treatment indicated   PT Diagnosis Impaired functional independence   Influenced by the following impairments Decreased alertness, impaired cognition, poor activity tolerance.    Functional limitations due to impairments Needs max assist of 2 for mobiility.    Clinical Presentation Stable/Uncomplicated   Clinical Decision Making (Complexity) Low complexity   Therapy Frequency Daily   Predicted Duration of Therapy Intervention (days/wks) 5 days   Anticipated Equipment Needs at Discharge   (To be determined by rehab. )   Anticipated Discharge Disposition Transitional Care Facility   Risk & Benefits of therapy have been explained Yes   Patient, Family & other  "staff in agreement with plan of care Yes   [facility] TM \"6 Clicks\"   2016, Trustees of [facility], under license to [employer].  All rights reserved.   6 Clicks Short Forms Basic Mobility Inpatient Short Form   [facility]  \"6 Clicks\" V.2 Basic Mobility Inpatient Short Form   1. Turning from your back to your side while in a flat bed without using bedrails? 2 - A Lot   2. Moving from lying on your back to sitting on the side of a flat bed without using bedrails? 2 - A Lot   3. Moving to and from a bed to a chair (including a wheelchair)? 1 - Total   4. Standing up from a chair using your arms (e.g., wheelchair, or bedside chair)? 1 - Total   5. To walk in hospital room? 1 - Total   6. Climbing 3-5 steps with a railing? 1 - Total   Basic Mobility Raw Score (Score out of 24.Lower scores equate to lower levels of function) 8   Total Evaluation Time   Total Evaluation Time (Minutes) 12     " Satisfactory

## 2020-09-18 NOTE — PROGRESS NOTES
Report called to station 73 PRASHAN[address] KAPLAN.  Patient is currently eating his dinner will transport over to 73 when he is finished.

## 2020-09-18 NOTE — PLAN OF CARE
Discharge Planner PT   Patient plan for discharge: Didn't state.  Current status: Patient seen for initial eval and treatment initiated. Patient very lethargic and unable to keep his eyes open even in sitting. Needed questions repeated several times and needed manual assist to complete all mobility tasks. Max assist of 2 to transfer sup to/from sit. Able to maintain sitting balance but leans backwards. Left in supine with bed alarm on.   Barriers to return to prior living situation: Currently needs assist of 2 for mobility.  Decreased alertness, very limited activity tolerance  Recommendations for discharge: TCU  Rationale for recommendations: Patient currently well below baseline and needing assist of 2 for mobility. Would benefit from continued skilled PT to progress him back to performed mobility with assist of 1 prior to discharging back to [place].        Entered by: Sophie Milton 09/18/2020 12:03 PM

## 2020-09-18 NOTE — PLAN OF CARE
Tele: Afib CVR. Very lethargic this shift. Oriented to place and person only. Unable to fully assess neuros fully d/t falling asleep during assessment. IVMF stopped this afternoon. Will continue to monitor.

## 2020-09-18 NOTE — PROGRESS NOTES
[facility]  Hospitalist Progress Note  Moody Potts MD  09/18/2020    Assessment & Plan   ASSESSMENT:  Mr. Maricarmen Lovell is a very pleasant 86-year-old gentleman with past medical history of hypertension, dyslipidemia, history of chronic atrial fibrillation on Xarelto, history of chronic kidney disease stage III, Alzheimer's dementia, depression, history of falls and history of CVA, who was brought in for evaluation status post a fall.  He was found to have an intraparenchymal hematoma within the right basal ganglia.      PLAN:   1.  Intracerebral hemorrhage + fall.   2.  Encephalopathy  -- there is a mechanical possible etiology as patient fell earlier on day of admission when he fell on his right knee and eventually hit his head on a cabinet in his apartment without loss of consciousness.   -- CT of the head without contrast in ER showed a small acute intraparenchymal hematoma within the right basal ganglia and prefrontal region inferior to the caudate and close with no significant mass effect.   -- does seem to have some underlying cognitive impairment that was documented in his chart.   -- patient awake, alert, oriented to self and partially to place and time  -- A repeat CT of the head in 6 hours showed a stable 1.1 cm long axis parenchyma hematoma at the anterior inferior medial aspect of the right basal ganglia with no evidence for new or increasing hemorrhage.  There is diffuse cerebral volume loss and cerebral white matter changes consistent with chronic small vessel ischemic disease. -- His INR in the ER was 1.59.  He received Kcentra in the ER.  --  Neuro Critical Care following   -- MRI showed bleed to be more subacute, possible chronic component, possible amyloid angiopathy and no obvious mass,   -- EEG shows no seizure activity  -- discontinue  Xarelto permanently per neuro-critical recommendations.  -- continue PT, OT and a formal Speech Language Pathology.   3.  History of  hypertension.   -- Not on any medications at home.    -- His blood pressure seems to be well controlled.   -- Given his intraparenchymal hemorrhage, systolic blood pressure goal should be below 140 mmHg as above.   4.  Dyslipidemia  -- not on any medications.   5.  History of chronic atrial fibrillation.  -- in atrial fibrillation with slow ventricular rate.  -- not on any beta blockers.  -- discontinue  Xarelto as above  6.  History of depression.   -- continue Effexor.   7.  Chronic kidney disease, stage III.   -- currently at baseline creatinine around 1.1.    8.  Pyuria.   -- His UA shows white blood cells of 12, large leukocyte esterase and few bacteria.  He denies any urinary symptoms.   -- no fever and no leukocytosis.  We will monitor him off antibiotics.    -- Urine cultures negative  9.  Deep venous thrombosis prophylaxis.   -- Hold his prior to admission Xarelto.   -- Pneumatic compression device has been ordered.      CODE STATUS:  Discussed with the patient and patient wishes to be DNR/DNI.      DISPOSITION:   -- anticipate to TCU on 9/21  -- will await decrease in his encephalopathy, likely 2-3 more days    Interval History   -- less active hallucinations  -- bit more sedated  -- tries to follow commands but weak R > L    -Data reviewed today: I reviewed all new labs and imaging over the last 24 hours. I personally reviewed no images or EKG's today.    Physical Exam    , Blood pressure 100/69, pulse 65, temperature 98  F (36.7  C), temperature source Oral, resp. rate 16, weight 113 kg (249 lb 1.9 oz), SpO2 95 %.  Vitals:    09/16/20 1015 09/17/20 0227   Weight: 113.4 kg (250 lb) 113 kg (249 lb 1.9 oz)     Vital Signs with Ranges  Temp:  [97.4  F (36.3  C)-98.1  F (36.7  C)] 98  F (36.7  C)  Pulse:  [63-73] 65  Resp:  [16-18] 16  BP: (100-135)/(62-81) 100/69  SpO2:  [93 %-95 %] 95 %  I/O's Last 24 hours  I/O last 3 completed shifts:  In: 480 [P.O.:480]  Out: 485 [Urine:485]    Constitutional:   More  sedated today  Respiratory: Clear to auscultation bilaterally, no crackles or wheezing  Cardiovascular: irregular  GI: Normal bowel sounds, soft, non-distended, non-tender  Skin/Integumen: No rashes, no cyanosis, no edema  Other:      Medications   All medications were reviewed.    - MEDICATION INSTRUCTIONS -         famotidine  20 mg Intravenous Q12H     venlafaxine  225 mg Oral QPM        Data   Recent Labs   Lab 09/18/20  1057 09/17/20  0431 09/16/20  1511 09/16/20  1014   WBC 7.5 7.5  --  6.7   HGB 15.7 15.8  --  14.6   MCV 97 98  --  98    228  --  210   INR  --  1.24* 1.74* 1.59*    136  --  140   POTASSIUM 4.3 3.9  --  4.5   CHLORIDE 108 105  --  107   CO2 30 23  --  31   BUN 15 15  --  19   CR 1.00 1.10  --  1.10   ANIONGAP 2* 8  --  2*   BEATRIZ 8.6 8.6  --  8.7   * 196*  --  112*       No results found for this or any previous visit (from the past 24 hour(s)).    Moody Potts MD  Text Page  (7am to 6pm)

## 2020-09-18 NOTE — PROGRESS NOTES
EEG CLINICAL NEUROPHYSIOLOGY PRELIMINARY REPORT    First 80 minutes of video EEG recording reviewed.  6 Hz posterior dominant rhythm while awake.  Moderate amounts of irregular bifrontal delta.  No clear focal features.  No epileptiform discharges or seizures.    Study consistent with moderate diffuse encephalopathy.  Thus far, no epileptiform discharges or seizures.Will continue video-EEG monitoring. Full report to follow.    Luis Fernando Aguilera MD  Pager [phone]

## 2020-09-19 ENCOUNTER — APPOINTMENT (OUTPATIENT)
Dept: PHYSICAL THERAPY | Facility: CLINIC | Age: 85
DRG: 085 | End: 2020-09-19
Attending: PHYSICIAN ASSISTANT
Payer: COMMERCIAL

## 2020-09-19 ENCOUNTER — APPOINTMENT (OUTPATIENT)
Dept: OCCUPATIONAL THERAPY | Facility: CLINIC | Age: 85
DRG: 085 | End: 2020-09-19
Attending: PHYSICIAN ASSISTANT
Payer: COMMERCIAL

## 2020-09-19 LAB
ANION GAP SERPL CALCULATED.3IONS-SCNC: 4 MMOL/L (ref 3–14)
BUN SERPL-MCNC: 20 MG/DL (ref 7–30)
CALCIUM SERPL-MCNC: 8.4 MG/DL (ref 8.5–10.1)
CHLORIDE SERPL-SCNC: 109 MMOL/L (ref 94–109)
CO2 SERPL-SCNC: 27 MMOL/L (ref 20–32)
CREAT SERPL-MCNC: 1.05 MG/DL (ref 0.66–1.25)
ERYTHROCYTE [DISTWIDTH] IN BLOOD BY AUTOMATED COUNT: 12.5 % (ref 10–15)
GFR SERPL CREATININE-BSD FRML MDRD: 64 ML/MIN/{1.73_M2}
GLUCOSE BLDC GLUCOMTR-MCNC: 117 MG/DL (ref 70–99)
GLUCOSE BLDC GLUCOMTR-MCNC: 93 MG/DL (ref 70–99)
GLUCOSE SERPL-MCNC: 98 MG/DL (ref 70–99)
HCT VFR BLD AUTO: 44.2 % (ref 40–53)
HGB BLD-MCNC: 15 G/DL (ref 13.3–17.7)
MAGNESIUM SERPL-MCNC: 2 MG/DL (ref 1.6–2.3)
MCH RBC QN AUTO: 33 PG (ref 26.5–33)
MCHC RBC AUTO-ENTMCNC: 33.9 G/DL (ref 31.5–36.5)
MCV RBC AUTO: 97 FL (ref 78–100)
PHOSPHATE SERPL-MCNC: 2.9 MG/DL (ref 2.5–4.5)
PLATELET # BLD AUTO: 212 10E9/L (ref 150–450)
POTASSIUM SERPL-SCNC: 3.9 MMOL/L (ref 3.4–5.3)
RBC # BLD AUTO: 4.54 10E12/L (ref 4.4–5.9)
SODIUM SERPL-SCNC: 140 MMOL/L (ref 133–144)
WBC # BLD AUTO: 8.1 10E9/L (ref 4–11)

## 2020-09-19 PROCEDURE — 12000000 ZZH R&B MED SURG/OB

## 2020-09-19 PROCEDURE — 99232 SBSQ HOSP IP/OBS MODERATE 35: CPT | Performed by: PSYCHIATRY & NEUROLOGY

## 2020-09-19 PROCEDURE — 97530 THERAPEUTIC ACTIVITIES: CPT | Mod: GP | Performed by: PHYSICAL THERAPIST

## 2020-09-19 PROCEDURE — 25000132 ZZH RX MED GY IP 250 OP 250 PS 637: Performed by: INTERNAL MEDICINE

## 2020-09-19 PROCEDURE — 97535 SELF CARE MNGMENT TRAINING: CPT | Mod: GO

## 2020-09-19 PROCEDURE — 83735 ASSAY OF MAGNESIUM: CPT | Performed by: INTERNAL MEDICINE

## 2020-09-19 PROCEDURE — 99232 SBSQ HOSP IP/OBS MODERATE 35: CPT | Performed by: INTERNAL MEDICINE

## 2020-09-19 PROCEDURE — 80048 BASIC METABOLIC PNL TOTAL CA: CPT | Performed by: INTERNAL MEDICINE

## 2020-09-19 PROCEDURE — 85027 COMPLETE CBC AUTOMATED: CPT | Performed by: INTERNAL MEDICINE

## 2020-09-19 PROCEDURE — 00000146 ZZHCL STATISTIC GLUCOSE BY METER IP

## 2020-09-19 PROCEDURE — 25000132 ZZH RX MED GY IP 250 OP 250 PS 637: Performed by: PHYSICIAN ASSISTANT

## 2020-09-19 PROCEDURE — 36415 COLL VENOUS BLD VENIPUNCTURE: CPT | Performed by: INTERNAL MEDICINE

## 2020-09-19 PROCEDURE — 25000125 ZZHC RX 250: Performed by: INTERNAL MEDICINE

## 2020-09-19 PROCEDURE — 97116 GAIT TRAINING THERAPY: CPT | Mod: GP | Performed by: PHYSICAL THERAPIST

## 2020-09-19 PROCEDURE — 84100 ASSAY OF PHOSPHORUS: CPT | Performed by: INTERNAL MEDICINE

## 2020-09-19 RX ORDER — FAMOTIDINE 20 MG/1
20 TABLET, FILM COATED ORAL 2 TIMES DAILY
Status: DISCONTINUED | OUTPATIENT
Start: 2020-09-19 | End: 2020-09-20

## 2020-09-19 RX ORDER — ATORVASTATIN CALCIUM 20 MG/1
20 TABLET, FILM COATED ORAL EVERY EVENING
Status: DISCONTINUED | OUTPATIENT
Start: 2020-09-19 | End: 2020-09-21 | Stop reason: HOSPADM

## 2020-09-19 RX ADMIN — ATORVASTATIN CALCIUM 20 MG: 20 TABLET, FILM COATED ORAL at 19:45

## 2020-09-19 RX ADMIN — VENLAFAXINE HYDROCHLORIDE 225 MG: 75 CAPSULE, EXTENDED RELEASE ORAL at 19:45

## 2020-09-19 RX ADMIN — FAMOTIDINE 20 MG: 10 INJECTION, SOLUTION INTRAVENOUS at 09:17

## 2020-09-19 RX ADMIN — FAMOTIDINE 20 MG: 20 TABLET ORAL at 20:12

## 2020-09-19 ASSESSMENT — ACTIVITIES OF DAILY LIVING (ADL)
ADLS_ACUITY_SCORE: 28
ADLS_ACUITY_SCORE: 25
ADLS_ACUITY_SCORE: 26
ADLS_ACUITY_SCORE: 26

## 2020-09-19 NOTE — PROGRESS NOTES
Pt's belongings present in room at this time:   Watch, id band from Assisted living, ring, life alert bracelet, clothing, shoes & belt.

## 2020-09-19 NOTE — PLAN OF CARE
Discharge Planner PT   Patient plan for discharge: None stated.   Current status: Much more alert and following at least 60% of commands. Oriented to name, birthdate, but not to place. Patient takes extra time to process cues and needed manual cues to initiate transfer from sup to sit. Min assist to guide trunk into sitting position. Good sitting balance. Needed multiple repeated verbal and manual cues to push from and reach back for sitting surface to transfer sit to/from stand. Patient tending initially to let his feet slide out in front of him. Therapist demonstrated correct technique for sit to/from stand. Patient performed times 4 with min assist initially progressing to CGA.   Gait training with ww 50 feet in room. Very short, choppy steps initially but improved with cues. Needed step by step cues for managing walker with turning. Cues for posture. Min assist provided for balance.   Barriers to return to prior living situation: Impaired cognition, Needs assist for all mobility, currently needs more assist than he was needing at AL.   Recommendations for discharge: TCU  Rationale for recommendations: Patient would benefit from continued skilled PT to progress his independence prior to discharging back to AL. If progresses quickly as inpatient and AL can provide assist needed then could discharge back to AL with HHPT and OT.        Entered by: Sophie Milton 09/19/2020 12:55 PM

## 2020-09-19 NOTE — PROGRESS NOTES
[facility]    Medicine Progress Note - Hospitalist Service       Date of Admission:  9/16/2020  Assessment & Plan       Maricarmen Lovell is a very pleasant 86-year-old gentleman with past medical history of hypertension, dyslipidemia, history of chronic atrial fibrillation on Xarelto, history of chronic kidney disease stage III, Alzheimer's dementia, depression, history of falls and history of CVA, who was brought in for evaluation status post a fall.  He was found to have an intraparenchymal hematoma within the right basal ganglia.     1.  Intracerebral hemorrhage + fall.   2.  Encephalopathy.  Improving  -- there is a mechanical possible etiology as patient fell earlier on day of admission when he fell on his right knee and eventually hit his head on a cabinet in his apartment without loss of consciousness.   -- CT of the head without contrast in ER showed a small acute intraparenchymal hematoma within the right basal ganglia and prefrontal region inferior to the caudate and close with no significant mass effect.   -- does seem to have some underlying cognitive impairment that was documented in his chart.   -- patient awake, alert, oriented to self and partially to place and time  -- A repeat CT of the head in 6 hours showed a stable 1.1 cm long axis parenchyma hematoma at the anterior inferior medial aspect of the right basal ganglia with no evidence for new or increasing hemorrhage.  There is diffuse cerebral volume loss and cerebral white matter changes consistent with chronic small vessel ischemic disease. -- His INR in the ER was 1.59.  He received Kcentra in the ER.  -- Neuro Critical Care following and appreciate their assistance.  Recommend repeat MRI in 3 months  -- MRI showed bleed to be more subacute, possible chronic component, possible amyloid angiopathy and no obvious mass,   -- EEG shows no seizure activity  -- discontinue  Xarelto permanently per neuro-critical  recommendations.  -- continue PT, OT and a formal Speech Language Pathology. Will benefit from TCU     3.  History of hypertension.   -- Not on any medications at home.    -- His blood pressure seems to be well controlled.   -- Given his intraparenchymal hemorrhage, systolic blood pressure goal should be below 140 mmHg as above.   -- PRN Hydralazine.  Has not had to be given     4.  Dyslipidemia  -- not on any medications.     5.  History of chronic atrial fibrillation.  -- in atrial fibrillation with slow ventricular rate.  -- not on any beta blockers.  -- discontinued  Xarelto as above.  Should not be discharged on this     6.  History of depression.   -- continue Effexor.     7.  Chronic kidney disease, stage III.   -- currently at baseline creatinine around 1.1.      8.  Pyuria.   -- His UA shows white blood cells of 12, large leukocyte esterase and few bacteria.  He denies any urinary symptoms.   -- no fever and no leukocytosis.  We will continue to monitor him off antibiotics.    -- Urine cultures negative       Diet: Regular Diet Adult    DVT Prophylaxis: Pneumatic Compression Devices  Solorio Catheter: not present  Code Status: DNR/DNI         Disposition Plan   Expected discharge: 2 - 3 days, recommended to transitional care unit once neurologic evaluation complete and mentation stable.  Entered: Micha Olguin DO 09/19/2020, 12:01 PM       The patient's care was discussed with the Care Coordinator/ and Patient.    Micha Olguin DO  Hospitalist Service  [facility]    ______________________________________________________________________    Interval History   Patient seen and examined.  No acute events over night.  Patient answering questions appropriately. No complaints of pain or difficulty breathing.  No fevers noted.  No vomiting.     Data reviewed today: I reviewed all medications, new labs and imaging results over the last 24 hours. I personally reviewed no images  or EKG's today.    Physical Exam   Vital Signs: Temp: 97.8  F (36.6  C) Temp src: Oral BP: 126/74 Pulse: 73   Resp: 16 SpO2: 93 % O2 Device: None (Room air)    Weight: 249 lbs 1.92 oz  General Appearance: Resting comfortably.  NAD   Respiratory: Clear to auscultation.  No respiratory distress  Cardiovascular: RRR.  No obvious murmurs  GI: Bowel sounds present.  Non-tender  Skin: No rashes.  No cyanosis  Other: EEG cap in place.  Oriented to person, year, month and reason for admission.  Knows he is in a hospital but believes he is in [place], [place]     Data   Recent Labs   Lab 09/19/20  0803 09/18/20  1057 09/17/20  0431 09/16/20  1511 09/16/20  1014   WBC 8.1 7.5 7.5  --  6.7   HGB 15.0 15.7 15.8  --  14.6   MCV 97 97 98  --  98    213 228  --  210   INR  --   --  1.24* 1.74* 1.59*    140 136  --  140   POTASSIUM 3.9 4.3 3.9  --  4.5   CHLORIDE 109 108 105  --  107   CO2 27 30 23  --  31   BUN 20 15 15  --  19   CR 1.05 1.00 1.10  --  1.10   ANIONGAP 4 2* 8  --  2*   BEATRIZ 8.4* 8.6 8.6  --  8.7   GLC 98 100* 196*  --  112*     No results found for this or any previous visit (from the past 24 hour(s)).

## 2020-09-19 NOTE — PLAN OF CARE
Nursing shift note  Pt here with R basal ganglia intraparenchymal hemorrhage after a fall at home. Alert, orientation fluctuates - hx of Alzheimer's. Neuros ex slow speech, BLE weakness (4/5). VSS on R/A, SBP parameter <140. Tele A-fib w/ CVR. On continuous EEG. Regular diet, thin liquids. Takes pills whole. Up with A2 GB/W to BSC. Incontinent at times. Denies pain. Pt scoring green on the Aggression Stop Light Tool. Plan to go to TCU at discharge, pending medical work-up.    Behavior & Aggression Tool color: Green      Pt's belongings:   (Belongings from admission day present in pt's room)          Home medications: N

## 2020-09-19 NOTE — PLAN OF CARE
R basal ganglia intraparenchymal hemorrhage/fall.  Disoriented to exact date/place mostly. Neuros - awake, vision ok - glasses on, sleeping between cares, cooperative, following directions, tremors/twitches upper and lower extremities at times, no dizziness or shortness of breath, denying numbness/tingling, good conversation. VSS. Tele A-fib, CVR. Regular diet/tolerated/no breakfast - late lunch at 1330. Up with 1 assist/gait belt/walker to bedside commode/tolerated up in chair.  Incontinent/continent of urine/bm today. Denies pain. Continuous EEG maintained.  No aggression. Possible discharge to TCU.

## 2020-09-19 NOTE — PLAN OF CARE
Pt here with Fall/ Intraparenchymal hemorrhage in R basal ganglia. Neuros: intact except confusion, restless/tremors at times. Generalized weakness.Transfer with A1 GBW. Voids adequately, incontinent at times.Rgular diet. TELE: A-fib CVR.   Plan to discharge to TCU.

## 2020-09-19 NOTE — PLAN OF CARE
Discharge Planner OT   Patient plan for discharge: not stated   Current status: Min A for sup>sit to bring torso fully upright, min A for sit>sup to bring legs to bed. Pt sat EOB w/good seated balance, some backward lean requiring tactile prompt and cues to correct. Pt completed 2 g/h tasks seated EOB w/min A for opening tooth paste then SBA given extra time and repeated cues for follow through. Pt following 60% of cues correctly this session. Pt oriented to person, birthdate, year, month, not date or place. Pt able to make jokes throughout session.   Barriers to return to prior living situation: Current level of A for functional mobility and ADL's, cognitive impairment  Recommendations for discharge: TCU  Rationale for recommendations: Pt below functional baseline w/ self-cares and will benefit from continued therapy to improve ind/safety w/ ADLs and mobility. If facility able to provide A x 2 for all mobility and ADL's, pt may be able to return home w/ home PT. Will continue to assess.       Entered by: Shivani Sheffield 09/19/2020 1:08 PM

## 2020-09-19 NOTE — PROVIDER NOTIFICATION
MD Notification    Notified Person: MD    Notified Person Name: Dr. Willy Murillo    Notification Date/Time: 2241 09/18/20    Notification Interaction: Page    Purpose of Notification: 701 [address] - Order clarification - has orders for Q2H and Q4H neuro checks. Please advise.    Orders Received: Q2H neuro checks    Comments:

## 2020-09-19 NOTE — PLAN OF CARE
Pt here with Fall/ Intraparenchymal hemorrhage in R basal ganglia. Neuros: intact except confusion, restless/tremors at times. Transfer with A2 GBW. Voids adequately, incontinent at times.Rgular diet. Pt on EEG: no seizures activities noted/repoted.

## 2020-09-19 NOTE — PROGRESS NOTES
"  [facility]    Stroke Progress Note    Interval Events  Prelim EEG read last night showed encephalopathy, no seizures.  BP in 110-120 range overnight.     Impression  1. R basal ganglia intraparenchymal hemorrhage, ICH score 1, location suggestive of nontraumatic or spontaneous hemorrhage (possibly associated with warfarin coagulopathy) however patient did have mechanical fall at onset so cannot rule out traumatic. However poor historian due to memory loss. Given brain MRI findings most consistent with bleed due to cerebral amyloid angiopathy  2. Dementia    Plan  - Given the MRI findings, recommend stopping anticoagulation permanently. Tried to reach family again today to discuss this recommendation-- sister Cassi, no answer. Conceivably patient could be a candidate for a watchman device to prevent further stroke from afib given his contraindication to anticoagulation, however not sure if this would be done given his dementia or if he/family would want such an invasive procedure. Will discuss with family again at his 8 week follow up because regardless, he should not be on anticoagulation in the short term due to the new hemorrhage.  - Repeat MRI brain with and without contrast as outpatient in 2-3 months  - Neuro checks q4 hours.  - Continue to maintain SBP <160mmHg for now, but long term goal is 120/80 (to be achieved within several weeks)  - Await final EEG read, however based on physical exam can stop it now  - Start lipitor for goal LDL around 70.    Hospital follow up for stroke clinic: schedule with stroke specialist THOR: Claudia Powers PA-C, Nella Gutiérrez CNP, Edel Leblanc PA-C in about 8 weeks.    Referral placed in discharge orders section (note: the order states \"neurosurgery\") [facility] [phone]    Will sign off      Berenice Powers PA-C  Neurology  09/19/2020 8:59 AM  To page me or covering stroke neurology team member, click here: AMCOM   Choose \"On Call\" tab at " "top, then search dropdown box for \"Neurology Adult\", select location, press Enter, then look for stroke/neuro ICU/telestroke.    ______________________________________________________    Medications     Scheduled Meds    atorvastatin  20 mg Oral QPM     famotidine  20 mg Intravenous Q12H     venlafaxine  225 mg Oral QPM       Infusion Meds    - MEDICATION INSTRUCTIONS -         PRN Meds  sodium chloride 0.9%, acetaminophen, acetaminophen, hydrALAZINE, hypromellose-dextran, labetalol, - MEDICATION INSTRUCTIONS -, metoclopramide **OR** metoclopramide, naloxone, ondansetron **OR** ondansetron, polyethylene glycol, potassium chloride, potassium chloride with lidocaine, potassium chloride, potassium chloride, potassium chloride, prochlorperazine **OR** prochlorperazine **OR** prochlorperazine, senna-docusate **OR** senna-docusate       PHYSICAL EXAMINATION  Temp:  [97.5  F (36.4  C)-98.5  F (36.9  C)] 97.8  F (36.6  C)  Pulse:  [65-79] 73  Resp:  [16-18] 16  BP: (100-134)/(69-86) 126/74  SpO2:  [93 %-96 %] 93 %     Neurologic  Mental Status:  alert, oriented to month and year (a dramatic improvement from yesterday), does not appear to be hallucinating anymore, no aphasia, states he is in a hospital, but thinks he is in [place], thinks he is here because he fell which is correct  Cranial Nerves:  PERRL, EOMI with normal smooth pursuit, facial movements symmetric, hearing not formally tested but intact to normal conversation voice, no dysarthria, tongue protrusion midline  Motor:  normal muscle tone and bulk, LUE resting high amplitude tremor, able to move all limbs spontaneously, no pronator drift  Reflexes:  toes down-going  Sensory: intact to noxious  Coordination:  no obvious ataxia  Station/Gait:  deferred    Imaging  I personally reviewed all imaging    MRI brain with and without contrast:  IMPRESSION:    1. Intraparenchymal hemorrhage in the anterior right basal ganglia  with intrinsic signal suggesting that it is " subacute in age. Mild  surrounding edema without significant mass effect or midline shift. No  definite underlying enhancement to suggest underlying mass although  the intrinsic T1 signal makes evaluation slightly difficult. Recommend  continued follow-up.  2. Several areas in both cerebral hemispheres suggesting sequela of  previous intraparenchymal hemorrhages including a subarachnoid  hemorrhage in the left parietal lobe. Findings raise concern for  possible cerebral amyloid angiopathy.  3. Moderate diffuse parenchymal volume loss and white matter changes  likely due to chronic microvascular ischemic disease.  4. Small extra-axial enhancing 5 mm lesion along the right clinoid  process most compatible with a meningioma.  5. 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    Lab Results Data   CBC  Recent Labs   Lab 09/19/20  0803 09/18/20  1057 09/17/20  0431   WBC 8.1 7.5 7.5   RBC 4.54 4.68 4.70   HGB 15.0 15.7 15.8   HCT 44.2 45.6 45.9    213 228     Basic Metabolic Panel    Recent Labs   Lab 09/19/20  0803 09/18/20  1057 09/17/20  0431    140 136   POTASSIUM 3.9 4.3 3.9   CHLORIDE 109 108 105   CO2 27 30 23   BUN 20 15 15   CR 1.05 1.00 1.10   GLC 98 100* 196*   BEATRIZ 8.4* 8.6 8.6     Liver Panel  No results for input(s): PROTTOTAL, ALBUMIN, BILITOTAL, ALKPHOS, AST, ALT, BILIDIRECT in the last 168 hours.  INR    Recent Labs   Lab Test 09/17/20  0431 09/16/20  1511 09/16/20  1014   INR 1.24* 1.74* 1.59*      Lipid Profile    Recent Labs   Lab Test 09/18/20  1057   CHOL 180   HDL 40   *   TRIG 74     A1C  No lab results found.  Troponin I  No results for input(s): TROPI in the last 168 hours.

## 2020-09-19 NOTE — PROVIDER NOTIFICATION
Tyrese Mccarthy, neuro fellow re: EEG.  eeg continues, note from Claudia stated... Await final EEG read, however based on physical exam can stop it now, please advise. will await additional orders & advisement re: EEG.  Patient still hooked up to EEG.    Claudia phoned back, she had contacted EEG this am re: disconnect EEG. Writer will phone EEG tech and check in with them on plan.

## 2020-09-20 ENCOUNTER — APPOINTMENT (OUTPATIENT)
Dept: PHYSICAL THERAPY | Facility: CLINIC | Age: 85
DRG: 085 | End: 2020-09-20
Attending: PHYSICIAN ASSISTANT
Payer: COMMERCIAL

## 2020-09-20 LAB
ANION GAP SERPL CALCULATED.3IONS-SCNC: 5 MMOL/L (ref 3–14)
BUN SERPL-MCNC: 23 MG/DL (ref 7–30)
CALCIUM SERPL-MCNC: 8.5 MG/DL (ref 8.5–10.1)
CHLORIDE SERPL-SCNC: 107 MMOL/L (ref 94–109)
CO2 SERPL-SCNC: 27 MMOL/L (ref 20–32)
CREAT SERPL-MCNC: 0.97 MG/DL (ref 0.66–1.25)
ERYTHROCYTE [DISTWIDTH] IN BLOOD BY AUTOMATED COUNT: 12.4 % (ref 10–15)
GFR SERPL CREATININE-BSD FRML MDRD: 70 ML/MIN/{1.73_M2}
GLUCOSE SERPL-MCNC: 106 MG/DL (ref 70–99)
HCT VFR BLD AUTO: 44.3 % (ref 40–53)
HGB BLD-MCNC: 15.1 G/DL (ref 13.3–17.7)
MAGNESIUM SERPL-MCNC: 2.1 MG/DL (ref 1.6–2.3)
MCH RBC QN AUTO: 33 PG (ref 26.5–33)
MCHC RBC AUTO-ENTMCNC: 34.1 G/DL (ref 31.5–36.5)
MCV RBC AUTO: 97 FL (ref 78–100)
PHOSPHATE SERPL-MCNC: 2.8 MG/DL (ref 2.5–4.5)
PLATELET # BLD AUTO: 207 10E9/L (ref 150–450)
POTASSIUM SERPL-SCNC: 3.7 MMOL/L (ref 3.4–5.3)
RBC # BLD AUTO: 4.57 10E12/L (ref 4.4–5.9)
SODIUM SERPL-SCNC: 139 MMOL/L (ref 133–144)
WBC # BLD AUTO: 6.6 10E9/L (ref 4–11)

## 2020-09-20 PROCEDURE — 97116 GAIT TRAINING THERAPY: CPT | Mod: GP

## 2020-09-20 PROCEDURE — 83735 ASSAY OF MAGNESIUM: CPT | Performed by: INTERNAL MEDICINE

## 2020-09-20 PROCEDURE — 85027 COMPLETE CBC AUTOMATED: CPT | Performed by: INTERNAL MEDICINE

## 2020-09-20 PROCEDURE — 36415 COLL VENOUS BLD VENIPUNCTURE: CPT | Performed by: INTERNAL MEDICINE

## 2020-09-20 PROCEDURE — 25000132 ZZH RX MED GY IP 250 OP 250 PS 637: Performed by: PHYSICIAN ASSISTANT

## 2020-09-20 PROCEDURE — 99232 SBSQ HOSP IP/OBS MODERATE 35: CPT | Performed by: INTERNAL MEDICINE

## 2020-09-20 PROCEDURE — 80048 BASIC METABOLIC PNL TOTAL CA: CPT | Performed by: INTERNAL MEDICINE

## 2020-09-20 PROCEDURE — 25000132 ZZH RX MED GY IP 250 OP 250 PS 637: Performed by: INTERNAL MEDICINE

## 2020-09-20 PROCEDURE — 84100 ASSAY OF PHOSPHORUS: CPT | Performed by: INTERNAL MEDICINE

## 2020-09-20 PROCEDURE — 12000000 ZZH R&B MED SURG/OB

## 2020-09-20 RX ORDER — ATORVASTATIN CALCIUM 20 MG/1
20 TABLET, FILM COATED ORAL EVERY EVENING
DISCHARGE
Start: 2020-09-20

## 2020-09-20 RX ADMIN — VENLAFAXINE HYDROCHLORIDE 225 MG: 75 CAPSULE, EXTENDED RELEASE ORAL at 19:06

## 2020-09-20 RX ADMIN — FAMOTIDINE 20 MG: 20 TABLET ORAL at 09:30

## 2020-09-20 RX ADMIN — ATORVASTATIN CALCIUM 20 MG: 20 TABLET, FILM COATED ORAL at 19:06

## 2020-09-20 ASSESSMENT — ACTIVITIES OF DAILY LIVING (ADL)
ADLS_ACUITY_SCORE: 26
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
## 2020-09-20 NOTE — CONSULTS
Care Transition Initial Assessment - SW     Met with: PATIENT    Active Problems:    Intracranial hemorrhage (H)       DATA  Lives With: facility resident   Living Arrangements: assisted living     Description of Support System: Involved  Who is your support system?: Sibling(s), Other (specify)  Support Assessment: Adequate social supports.   Identified issues/concerns regarding health management: Need for increased services at time of discharge.    ASSESSMENT  Cognitive Status:  Awake, alert, semi-oriented  Concerns to be addressed: Discharge planning    SW reviewed chart and met w/patient to discuss discharge planning. Pt was admitted 9/16/20 with intracranial hemorrhage. Anticipated discharge date: 9/21/20. SW introduced self and role. Pt confirmed he resides at [facility], stating he has lived there 16 years. Pt reports not being hospitalized in the past. (See previous SW note for cares provided at [facility]).  We reviewed therapy recommendation for: ARU/TCU. Of interest, [facility] liaison has reviewed pt's case and stated they would deny him based on his cognition. Pt in agreement w/TCU, requesting [place] or [facility], as both are close to his home. SW sent referrals thru DOD. Pt states he does not have a relationship with his sister, Arlene, but [facility] reports sister is listed as their next of kin for pt. Patient also has a friend Campos Arredondo listed at [facility] as a contact: [phone]. Pt may need some clothing brought to the TCU at time of discharge. SW will need to contact either sister or friend, Campos.  We did not discuss transport. Will continue to follow.     PLAN  Financial costs for the patient includes: Possible transport costs.  Patient given options and choices for discharge: Yes .  Patient/family is agreeable to the plan? Yes  Transportation/person available to transport on day of discharge  is TBD  Patient Goals and Preferences: Discharge to TCU .  Patient anticipates  discharging to:  TCU .    Continue to assist as needed.    LULU Bob   [facility]  [phone]

## 2020-09-20 NOTE — PROGRESS NOTES
[facility]    Medicine Progress Note - Hospitalist Service       Date of Admission:  9/16/2020  Assessment & Plan       Maricarmen Lovell is a very pleasant 86-year-old gentleman with past medical history of hypertension, dyslipidemia, history of chronic atrial fibrillation on Xarelto, history of chronic kidney disease stage III, Alzheimer's dementia, depression, history of falls and history of CVA, who was brought in for evaluation status post a fall.  He was found to have an intraparenchymal hematoma within the right basal ganglia.     1.  Intracerebral hemorrhage + fall.   2.  Encephalopathy- improved  - there is a mechanical possible etiology as patient fell earlier on day of admission when he fell on his right knee and eventually hit his head on a cabinet in his apartment without loss of consciousness.   - PTA on Xarelto  - CT of the head without contrast in ER showed a small acute intraparenchymal hematoma within the right basal ganglia and prefrontal region inferior to the caudate and close with no significant mass effect.   - admitted initially to ICU for close monitoring; PTA Xarelto held on admission   - INR was 1.59 on admission;  received Kcentra in the ER.  - NCC consulted  - repeat CT of the head in 6 hours showed a stable 1.1 cm long axis parenchyma hematoma at the anterior inferior medial aspect of the right basal ganglia with no evidence for new or increasing hemorrhage.  There is diffuse cerebral volume loss and cerebral white matter changes consistent with chronic small vessel ischemic disease.   - MRI brain 09/17- showed intraparenchymal hemorrhage in the anterior right basal ganglia with intrinsic signal suggesting that it is subacute in age. Mild  surrounding edema without significant mass effect or midline shift; several areas in both cerebral hemispheres suggesting sequela of previous intraparenchymal hemorrhages including a subarachnoid hemorrhage in the left parietal lobe-  findings raise concern for possible cerebral amyloid angiopathy; Small extra-axial enhancing 5 mm lesion along the right clinoid process most compatible with a meningioma.  - does seem to have some underlying cognitive impairment that was documented in his chart.   - EEG shows no seizure activity  - as NCC- 3mo follow-up MRI  - Given concern for CAA- with risk of bleeding, stopping anticoagulation recommended at this time  - Watchman device placement may be considered in future (neuro will talk with the family at 8 weeks appointment) to mitigate stroke/bleed risk  - Hold antithrombotics for now  - as per Neuro- SBP <160mmHg for now, but long term goal is 120/80 (to be achieved within several weeks)  - started on Lipitor 20 mg po daily  - follow up with Neurology  - PT/OT- rec TCU  - SLP- regular diet  -      3.  History of hypertension.   - Not on any medications at home.    - His blood pressure seems to be well controlled.   - SBP goal as above  - PRN Hydralazine available but not needed.    4.  Dyslipidemia  -- not on any medications PTA  - FLP- , total cholest 180, HDL 40  - started on Lipitor 20 mg po daily.     5. Chronic atrial fibrillation.  - not on any beta blockers.  - HR controlled in 70-80  - discontinued  Xarelto as above; should not be discharged on this     6.  History of depression.   - continue PTA Effexor.     7.  Chronic kidney disease, stage III.   - currently at baseline creatinine around 1.1--0.97.      8.  Pyuria.   - His UA shows white blood cells of 12, large leukocyte esterase and few bacteria.  He denies any urinary symptoms.   - no fever and no leukocytosis.   - Urine cultures negative       Diet: Regular Diet Adult    DVT Prophylaxis: Pneumatic Compression Devices  Solorio Catheter: not present  Code Status: DNR/DNI         Disposition Plan   Expected discharge: Tomorrow?, recommended to transitional care unit once bed available.  Entered: Karmen Pederson MD 09/20/2020, 3:29  PM       The patient's care was discussed with the Bedside Nurse, Care Coordinator/ and Patient.    Karmen Pederson MD  Hospitalist Service  [facility]    ______________________________________________________________________    Interval History    Doing fine, no events overnight, a little bit slow to respond but orientedX3 at the time of my examination; it seems that his orientation fluctuates; no N/V, no abd pain, no chest pain, no SOB.      Data reviewed today: I reviewed all medications, new labs and imaging results over the last 24 hours. I personally reviewed no images or EKG's today.    Physical Exam   Vital Signs: Temp: 97.4  F (36.3  C) Temp src: Oral BP: 116/70 Pulse: 81   Resp: 16 SpO2: 96 % O2 Device: None (Room air)    Weight: 242 lbs 0 oz     General Appearance: Resting comfortably.  NAD   Respiratory: Clear to auscultation.  No respiratory distress  Cardiovascular: RRR.  No obvious murmurs  GI: Bowel sounds present.  Non-tender  Skin: No rashes.  No cyanosis  Neuro: AAOXX3, resting tremor of the hands, no new FNDs  Psych- normal mood, normal affect.      Data   Recent Labs   Lab 09/20/20  0737 09/19/20  0803 09/18/20  1057 09/17/20  0431 09/16/20  1511 09/16/20  1014   WBC 6.6 8.1 7.5 7.5  --  6.7   HGB 15.1 15.0 15.7 15.8  --  14.6   MCV 97 97 97 98  --  98    212 213 228  --  210   INR  --   --   --  1.24* 1.74* 1.59*    140 140 136  --  140   POTASSIUM 3.7 3.9 4.3 3.9  --  4.5   CHLORIDE 107 109 108 105  --  107   CO2 27 27 30 23  --  31   BUN 23 20 15 15  --  19   CR 0.97 1.05 1.00 1.10  --  1.10   ANIONGAP 5 4 2* 8  --  2*   BEATRIZ 8.5 8.4* 8.6 8.6  --  8.7   * 98 100* 196*  --  112*     No results found for this or any previous visit (from the past 24 hour(s)).

## 2020-09-20 NOTE — PLAN OF CARE
ICH right basal ganglia/post fall. Neuros - disoriented x 3, knows his name & birthdate, tremors noted, rigidity & slow gait/slower speech/confused conversation at times/pleasant & not resistant to cares. Tolerated up in chair for good chunk of the day/assist with meal set up & ordering provided/good appetite. VSS. Tele - afib with Cvr.  Pills whole with water. Up with 1 assist/gait belt/walker to bathroom/shy bladder - voiding fine/bm yesterday. Denies pain. Possible discharge to TCU tomorrow.

## 2020-09-20 NOTE — PLAN OF CARE
Pt here with Fall/ Intraparenchymal hemorrhage in R basal ganglia. Neuros: intact except confusion, restless/tremors at times. Generalized weakness.Transfer with A1 GBW. Voids adequately, incontinent at times, had one BM on PM shift.Rgular diet. TELE: A-fib CVR.   Plan to discharge to TCU.

## 2020-09-20 NOTE — PLAN OF CARE
Nursing shift note  Pt here with R basal ganglia intraparenchymal hemorrhage after a fall. Alert, orientation fluctuates - hx of Alzheimer's. Resistive w/ cares at times. Neuros ex slow speech, generalized weakness. VSS on R/A, SBP parameter <140. Tele A-fib w/ CVR. Regular diet, thin liquids. Takes pills whole. Up with A1 GB/W to BSC. Incontinent at times. Denies pain. Pt scoring green on the Aggression Stop Light Tool. Plan to go to TCU at discharge.     Behavior & Aggression Tool color: Green        Pt's belongings:   (Belongings from admission day present in pt's room)            Home medications: N

## 2020-09-21 ENCOUNTER — APPOINTMENT (OUTPATIENT)
Dept: PHYSICAL THERAPY | Facility: CLINIC | Age: 85
DRG: 085 | End: 2020-09-21
Attending: PHYSICIAN ASSISTANT
Payer: COMMERCIAL

## 2020-09-21 VITALS
SYSTOLIC BLOOD PRESSURE: 125 MMHG | RESPIRATION RATE: 18 BRPM | OXYGEN SATURATION: 93 % | HEART RATE: 87 BPM | TEMPERATURE: 97.8 F | DIASTOLIC BLOOD PRESSURE: 88 MMHG | WEIGHT: 203.1 LBS

## 2020-09-21 LAB
MAGNESIUM SERPL-MCNC: 2.1 MG/DL (ref 1.6–2.3)
PHOSPHATE SERPL-MCNC: 2.7 MG/DL (ref 2.5–4.5)

## 2020-09-21 PROCEDURE — 97116 GAIT TRAINING THERAPY: CPT | Mod: GP | Performed by: PHYSICAL THERAPY ASSISTANT

## 2020-09-21 PROCEDURE — 97530 THERAPEUTIC ACTIVITIES: CPT | Mod: GP | Performed by: PHYSICAL THERAPY ASSISTANT

## 2020-09-21 PROCEDURE — 36415 COLL VENOUS BLD VENIPUNCTURE: CPT | Performed by: INTERNAL MEDICINE

## 2020-09-21 PROCEDURE — 83735 ASSAY OF MAGNESIUM: CPT | Performed by: INTERNAL MEDICINE

## 2020-09-21 PROCEDURE — 84100 ASSAY OF PHOSPHORUS: CPT | Performed by: INTERNAL MEDICINE

## 2020-09-21 PROCEDURE — 99239 HOSP IP/OBS DSCHRG MGMT >30: CPT | Performed by: HOSPITALIST

## 2020-09-21 ASSESSMENT — VISUAL ACUITY
OU: NORMAL ACUITY
OU: NORMAL ACUITY

## 2020-09-21 ASSESSMENT — ACTIVITIES OF DAILY LIVING (ADL)
ADLS_ACUITY_SCORE: 26

## 2020-09-21 NOTE — DISCHARGE SUMMARY
[facility]  Hospitalist Discharge Summary      Date of Admission:  9/16/2020  Date of Discharge:  9/21/2020  Discharging Provider: Moody Brown MD      Discharge Diagnoses   Acute intracerebral hemorrhage  Acute metabolic encephalopathy  Hx HTN  Dyslipidemia  Chronic A-fib  Depression  CKD stage III    Follow-ups Needed After Discharge   Follow-up Appointments     Follow Up and recommended labs and tests      Follow up with Nursing home physician in 2-3 days  Follow up with primary care provider after returning home  Follow up with Neurology in about 8 weeks.  You will require repeat MRI in   3 months.             Discharge Disposition   Discharged to nursing home  Condition at discharge: Stable    Hospital Course   Maricarmen Lovell is a very pleasant 86-year-old gentleman with past medical history of hypertension, dyslipidemia, history of chronic atrial fibrillation on Xarelto, history of chronic kidney disease stage III, Alzheimer's dementia, depression, history of falls and history of CVA, who was brought in for evaluation status post a fall.  He was found to have an intraparenchymal hematoma within the right basal ganglia.      Acute intracerebral hemorrhage, traumatic  Patient reported mechanical fall prior to admission with no LOC.  Admission CT head showed a small acute intraparenchymal hematoma within the right basal ganglia and prefrontal region, stable on repeat.  Xarelto was held and he received KCentra at admission.  MRI brain 09/17 showed intraparenchymal hemorrhage in the anterior right basal ganglia with mild surrounding edema without significant mass effect or midline shift; several areas in both cerebral hemispheres suggesting sequela of previous intraparenchymal hemorrhages including a subarachnoid hemorrhage in the left parietal lobe - findings raise concern for possible cerebral amyloid angiopathy.  With concern for CAA, anticoagulation has been discontinued with neurology to  discuss potential Watchman device in follow up.  Will have follow up with Neurology in 8 weeks and repeat MRI in 3 months.      Acute metabolic encephalopathy due to above  Coes seem to have some underlying cognitive impairment that was documented in his chart. EEG was negative for seizure activity.       History of hypertension.   Not on treatment at home and normotensive this admission with blood pressure at goal of <120 systolic.      Dyslipidemia  FLP showed , total cholest 180, HDL 40; initiated on Lipitor 20 mg daily.       Chronic atrial fibrillation.  Rates controlled without AV zelalem blockers.  Xarelto discontinued as above, discuss Watchman in follow up with Neurology.       History of depression.   Continue PTA Effexor.      Chronic kidney disease, stage III.   Baseline Cr 1.0-1.1, stable.       Consultations This Hospital Stay   PHYSICAL THERAPY ADULT IP CONSULT  OCCUPATIONAL THERAPY ADULT IP CONSULT  SPEECH LANGUAGE PATH ADULT IP CONSULT  SOCIAL WORK IP CONSULT  SMOKING CESSATION PROGRAM IP CONSULT  PHARMACY IP CONSULT  PHYSICAL THERAPY ADULT IP CONSULT  OCCUPATIONAL THERAPY ADULT IP CONSULT    Code Status   No CPR- Do NOT Intubate    Time Spent on this Encounter   I, Moody Brown MD, personally saw the patient today and spent greater than 30 minutes discharging this patient.       Moody Brown MD  [facility]  ______________________________________________________________________    Physical Exam   Vital Signs: Temp: 97.8  F (36.6  C) Temp src: Oral BP: 125/88 Pulse: 87   Resp: 18 SpO2: 93 % O2 Device: None (Room air)    Weight: 203 lbs 1.6 oz  General Appearance: Well nourished elderly male in NAD  Respiratory: lungs CTAB, no wheezes or crackles, no tachypnea  Cardiovascular: RRR, normal s1/s2 without murmur  GI: abdomen soft, normal bowel sounds  Other: Alert, oriented to person and knows he is in a hospital, disoriented to date and situation        Primary Care Physician    [facility]    Discharge Orders      NEUROSURGERY REFERRAL      General info for SNF    Length of Stay Estimate: Short Term Care: Estimated # of Days <30  Condition at Discharge: Improving  Level of care:skilled   Rehabilitation Potential: Fair  Admission H&P remains valid and up-to-date: Yes  Recent Chemotherapy: N/A  Use Nursing Home Standing Orders: Yes     Mantoux instructions    Give two-step Mantoux (PPD) Per Facility Policy Yes     Reason for your hospital stay    Intraparenchymal hemorrhage     Activity - Up with nursing assistance     Follow Up and recommended labs and tests    Follow up with Nursing home physician in 2-3 days  Follow up with primary care provider after returning home  Follow up with Neurology in about 8 weeks.  You will require repeat MRI in 3 months.     No CPR- Do NOT Intubate     Physical Therapy Adult Consult    Evaluate and treat as clinically indicated.    Reason:  Intraparenchymal hemorrhage     Occupational Therapy Adult Consult    Evaluate and treat as clinically indicated.    Reason: Intraparenchymal hemorrhage     Fall precautions     Advance Diet as Tolerated    Follow this diet upon discharge: Orders Placed This Encounter      Regular Diet Adult       Significant Results and Procedures   Most Recent 3 CBC's:  Recent Labs   Lab Test 09/20/20  0737 09/19/20  0803 09/18/20  1057   WBC 6.6 8.1 7.5   HGB 15.1 15.0 15.7   MCV 97 97 97    212 213     Most Recent 3 BMP's:  Recent Labs   Lab Test 09/20/20  0737 09/19/20  0803 09/18/20  1057    140 140   POTASSIUM 3.7 3.9 4.3   CHLORIDE 107 109 108   CO2 27 27 30   BUN 23 20 15   CR 0.97 1.05 1.00   ANIONGAP 5 4 2*   BEATRIZ 8.5 8.4* 8.6   * 98 100*     Most Recent 3 Troponin's:No lab results found.  Most Recent Cholesterol Panel:  Recent Labs   Lab Test 09/18/20  1057   CHOL 180   *   HDL 40   TRIG 74     Most Recent Urinalysis:  Recent Labs   Lab Test 09/16/20  1642   COLOR Yellow   APPEARANCE  Clear   URINEGLC Negative   URINEBILI Negative   URINEKETONE Negative   SG 1.023   UBLD Negative   URINEPH 6.5   PROTEIN Negative   NITRITE Negative   LEUKEST Large*   RBCU 1   WBCU 12*   ,   Results for orders placed or performed during the hospital encounter of 09/16/20   CT Head w/o Contrast     Value    Radiologist flags Acute head bleed (AA)    Narrative    CT SCAN OF THE HEAD WITHOUT CONTRAST   9/16/2020 9:35 AM     HISTORY: Fall, hit head, on Xarelto.    TECHNIQUE: Axial images of the head and coronal reformations without  IV contrast material. Radiation dose for this scan was reduced using  automated exposure control, adjustment of the mA and/or kV according  to patient size, or iterative reconstruction technique.    COMPARISON: None.    FINDINGS:    A small acute intraparenchymal hematoma is present centered within the  right basal ganglia/prefrontal region inferior to the right caudate  head measuring approximately 13 x 10 x 5 mm (AP by TR by SI). Mild  surrounding vasogenic edema. No significant mass effect.    Background of mild parenchymal volume loss is present with patchy and  confluent white matter hypoattenuation likely reflecting chronic small  vessel ischemic change. Old infarct is present involving the left  superior parietal lobule.    The visualized calvarium, tympanic cavities, and mastoid cavities are  unremarkable. Bilateral lens replacements are present.      Impression    IMPRESSION:   1. Small acute intraparenchymal hematoma within the right basal  ganglia/prefrontal region inferior to the caudate nucleus. No  significant mass effect. Recommend continued follow-up.  2. Volume loss, chronic small vessel ischemic change, and old left  parietal infarct.    [Critical Result: Acute head bleed]    Finding was identified on 9/16/2020 9:48 AM.     Dr. Jones was contacted by me on 9/16/2020 9:59 AM and verbalized  understanding of the critical result.     MYRNA BERGER MD   CT Head w/o Contrast     Narrative    CT OF THE HEAD WITHOUT CONTRAST 9/16/2020 3:59 PM     COMPARISON: Head CT 9/16/2020 at 0923 hours.    HISTORY: Intracranial hemorrhage, known, follow up. Right basal  ganglia IPH.    TECHNIQUE: 5 mm thick axial CT images of the head were acquired  without IV contrast material.    FINDINGS: Small parenchymal hematoma centered in the anterior inferior  right basal ganglia measuring 1.1 cm long axis again noted without  appreciable change. No definite evidence for new or increasing  hemorrhage.     There is moderate diffuse cerebral volume loss. There are subtle  patchy areas of decreased density in the cerebral white matter  bilaterally that are consistent with sequela of chronic small vessel  ischemic disease. The ventricles and basal cisterns are within normal  limits in configuration given the degree of cerebral volume loss.   There is no midline shift. There are no extra-axial fluid collections.    No intracranial mass or recent infarct.    The visualized paranasal sinuses are well-aerated. There is no  mastoiditis. There are no fractures of the visualized bones.      Impression    IMPRESSION:   1. Stable 1.1 cm long axis parenchyma hematoma at the anterior  inferior medial aspect of the right basal ganglia. No evidence for new  or increasing hemorrhage.  2. Diffuse cerebral volume loss and cerebral white matter changes  consistent with chronic small vessel ischemic disease.      Radiation dose for this scan was reduced using automated exposure  control, adjustment of the mA and/or kV according to patient size, or  iterative reconstruction technique    NEREIDA LINN MD   CTA Head with Contrast    Narrative    CT ANGIOGRAM OF THE HEAD WITHOUT AND WITH CONTRAST  9/16/2020 9:04 PM     COMPARISON: None.    HISTORY: Right basal ganglia IPH.    TECHNIQUE: Precontrast localizing scans were followed by CT  angiography with an injection of 70mL [id number]-370 nonionic intravenous  contrast material with scans  through the head. Images were transferred  to a separate 3-D workstation where multiplanar reformations and 3-D  images were created.      FINDINGS: The basilar, bilateral intracranial distal internal carotid,  bilateral anterior cerebral, bilateral middle cerebral and bilateral  posterior cerebral arteries are patent and unremarkable. The anterior  communicating and bilateral posterior communicating arteries are  patent.    Incidental note is made of a 4 mm enhancing extra-axial nodule at the  anterior medial aspect of the right middle cranial fossa consistent  with a small meningioma.      Impression    IMPRESSION: Normal head CTA. Specifically, no evidence for vascular  abnormality to explain the right basal ganglia hemorrhage.    Radiation dose for this scan was reduced using automated exposure  control, adjustment of the mA and/or kV according to patient size, or  iterative reconstruction technique    NEREIDA LINN MD   MR Brain w/o & w Contrast    Narrative    MRI BRAIN WITHOUT AND WITH CONTRAST  9/17/2020 2:09 PM     HISTORY: Recent intracranial hemorrhage.    TECHNIQUE: Multiplanar, multisequence MRI of the brain without and  with 10 mL Gadavist.     COMPARISON: Head CT 9/16/2020.     FINDINGS: Intraparenchymal hemorrhage is again seen in the right basal  ganglia, the hemorrhage is intrinsically T1 hyperintense and T2  hypointense suggesting that it is subacute in age. Mild surrounding T2  hyperintense edema. No associated enhancement is appreciated noting  that evaluation is slightly difficult given the intrinsic T1 signal of  the hemorrhage. The hemorrhage demonstrate susceptibility  hypointensity.    There are several additional foci of susceptibility hypointensity  including areas in the bilateral frontal lobes, cortical areas in the  left parietal lobe, and foci in the left temporal lobe and probably  right occipital lobe.    No restricted diffusion to suggest acute infarct. No significant mass  effect  or midline shift. Moderate diffuse parenchymal volume loss.  Patchy periventricular white matter T2 hyperintensities are  nonspecific, but likely related to chronic microvascular ischemic  disease.    Linear enhancing structure in the right frontal lobe is compatible  with a developmental venous anomaly.    Extra-axial enhancing 5 mm lesion along the right clinoid process  (series 1301 image 10) is most compatible with a meningioma.    The facial structures appear normal.       Impression    IMPRESSION:    1. Intraparenchymal hemorrhage in the anterior right basal ganglia  with intrinsic signal suggesting that it is subacute in age. Mild  surrounding edema without significant mass effect or midline shift. No  definite underlying enhancement to suggest underlying mass although  the intrinsic T1 signal makes evaluation slightly difficult. Recommend  continued follow-up.  2. Several areas in both cerebral hemispheres suggesting sequela of  previous intraparenchymal hemorrhages including a subarachnoid  hemorrhage in the left parietal lobe. Findings raise concern for  possible cerebral amyloid angiopathy.  3. Moderate diffuse parenchymal volume loss and white matter changes  likely due to chronic microvascular ischemic disease.  4. Small extra-axial enhancing 5 mm lesion along the right clinoid  process most compatible with a meningioma.  5. 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      NADJA JETER MD       Discharge Medications   Current Discharge Medication List      START taking these medications    Details   atorvastatin (LIPITOR) 20 MG tablet Take 1 tablet (20 mg) by mouth every evening    Associated Diagnoses: Dyslipidemia         CONTINUE these medications which have NOT CHANGED    Details   acetaminophen (TYLENOL) 325 MG tablet Take 650 mg by mouth every 4 hours as needed for mild pain      hypromellose (ARTIFICIAL TEARS) 0.5 % SOLN ophthalmic solution Place 1 drop into both eyes every hour as  needed for dry eyes      melatonin 3 MG tablet Take 1 mg by mouth At Bedtime      polyethylene glycol (MIRALAX) 17 g packet Take 1 packet by mouth daily as needed for constipation      psyllium (METAMUCIL/KONSYL) Packet Take 1 packet by mouth daily as needed for constipation      senna-docusate (SENNA S) 8.6-50 MG tablet Take 1 tablet by mouth 2 times daily      venlafaxine (EFFEXOR-ER) 225 MG 24 hr tablet Take 225 mg by mouth daily      vitamin D3 (CHOLECALCIFEROL) 50 mcg (2000 units) tablet Take 1 tablet by mouth daily         STOP taking these medications       rivaroxaban ANTICOAGULANT (XARELTO) 15 MG TABS tablet Comments:   Reason for Stopping:             Allergies   Allergies   Allergen Reactions     Pecan [Nuts] Anaphylaxis     Fluoxetine Nausea

## 2020-09-21 NOTE — PLAN OF CARE
Pt A+Ox2-3. Denies Pain. Neuros mostly intact, Slow to respond at times but follows commands. VSS on RA. Sys <140 without prn medication. Moving well in bed IND with a minimal assist at times.Incontinent of urine at times. Urinal at bedside. Plan for pt to go to TCU at discharge. Will continue to monitor.

## 2020-09-21 NOTE — PROGRESS NOTES
Ryan Progress Note  Chart Reviewed, Pt discussed in Interdisciplinary Rounds.   Pt has TCU referrals out    Intervention:   RYAN contacted Coleen with [facility] and they are currently assessing pt.  Coleen from [facility] called and stated that they are able to accept pt today to TCU at 2:30 via facility transport aide.  SW spoke with pt regarding TCU and he is agreeable to discharge plan. Pt states that all he has to wear now is hospital gown. RYAN asked pt if sister should be notified of discharge and he agreed to have his sister notified and see if she could bring him some clothing.  RYAN called sisterArlene and left voice message regarding discharge plan and pt's need for clothing.  SW sent discharge orders via DOD.  ..PAS-RR    D: Per [facility] regulation, RYAN completed and submitted PAS-RR to [place] [employer] Direct Connect via the Senior LinkAge Line.    PAS-RR confirmation # is : [id number]  P: Further questions may be directed to Senior LinkAge Line at #[phone], option #4 for PAS-RR staff.      Team Members notified:   MARQUITA,RN,HUC,BB    Plan: Discharge to TCU.  Anticipated discharge placement: [facility]  Follow up plan: arrange transport, discharge orders, LULU Givens  [facility]  [phone]

## 2020-09-22 ENCOUNTER — NURSING HOME VISIT (OUTPATIENT)
Dept: GERIATRICS | Facility: CLINIC | Age: 85
End: 2020-09-22
Payer: COMMERCIAL

## 2020-09-22 VITALS
RESPIRATION RATE: 18 BRPM | WEIGHT: 203.1 LBS | SYSTOLIC BLOOD PRESSURE: 116 MMHG | HEIGHT: 70 IN | DIASTOLIC BLOOD PRESSURE: 74 MMHG | HEART RATE: 61 BPM | TEMPERATURE: 97.1 F | BODY MASS INDEX: 29.07 KG/M2 | OXYGEN SATURATION: 92 %

## 2020-09-22 DIAGNOSIS — F32.A DEPRESSION, UNSPECIFIED DEPRESSION TYPE: ICD-10-CM

## 2020-09-22 DIAGNOSIS — K59.01 SLOW TRANSIT CONSTIPATION: ICD-10-CM

## 2020-09-22 DIAGNOSIS — N18.30 CKD (CHRONIC KIDNEY DISEASE) STAGE 3, GFR 30-59 ML/MIN (H): ICD-10-CM

## 2020-09-22 DIAGNOSIS — Z71.89 ADVANCED DIRECTIVES, COUNSELING/DISCUSSION: ICD-10-CM

## 2020-09-22 DIAGNOSIS — E78.5 DYSLIPIDEMIA: ICD-10-CM

## 2020-09-22 DIAGNOSIS — R53.81 PHYSICAL DECONDITIONING: ICD-10-CM

## 2020-09-22 DIAGNOSIS — R29.6 FREQUENT FALLS: ICD-10-CM

## 2020-09-22 DIAGNOSIS — I61.9 INTRAPARENCHYMAL HEMORRHAGE OF BRAIN (H): Primary | ICD-10-CM

## 2020-09-22 DIAGNOSIS — G30.1 LATE ONSET ALZHEIMER'S DISEASE WITHOUT BEHAVIORAL DISTURBANCE (H): ICD-10-CM

## 2020-09-22 DIAGNOSIS — I48.20 CHRONIC A-FIB (H): ICD-10-CM

## 2020-09-22 DIAGNOSIS — J43.9 PULMONARY EMPHYSEMA, UNSPECIFIED EMPHYSEMA TYPE (H): ICD-10-CM

## 2020-09-22 DIAGNOSIS — F02.80 LATE ONSET ALZHEIMER'S DISEASE WITHOUT BEHAVIORAL DISTURBANCE (H): ICD-10-CM

## 2020-09-22 PROCEDURE — 99309 SBSQ NF CARE MODERATE MDM 30: CPT | Performed by: NURSE PRACTITIONER

## 2020-09-22 ASSESSMENT — MIFFLIN-ST. JEOR: SCORE: 1607.51

## 2020-09-22 NOTE — PROGRESS NOTES
[facility]  PRIMARY CARE PROVIDER AND CLINIC:  [facility], [address] [place] [id number]  Chief Complaint   Patient presents with     Hospital F/U     [facility] Record Number:  [id number]  Place of Service where encounter took place:  [facility] [[id number]]    Maricarmen Lovell  is a 86 year old  (4/8/1934), admitted to the above facility from  [facility]. Hospital stay 9/16/2020 through 9/21/2020..  Admitted to this facility for  rehab, medical management and nursing care.    HPI:    HPI information obtained from: facility chart records, facility staff, patient report and [facility] chart review.   Brief Summary of Hospital Course:   He has a medical history significant for afib on Xarelto, CKD stage 3, COPD, hyperlipidemia, Alzheimer's dementia, depression, and was hospitalized after presenting to the ED with a mechanical fall. He was coming out of the bathroom, turned around and fell. He hit his head, but did not lose consciousness. EKG showed afib with slow ventricular response, HR 57. 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 Labs were within range with Hgb 14.6 and creatinine 1.10. INR was 1.59 and Kcentra was given. MRI brain 9/17/2020 showed intraparenchymal hemorrhage in the anterior right basal ganglia with mild surrounding edema, no significant mass effect or midline shift. Several areas in both cerebral hemispheres suggesting sequela of previous hemorrhages, concerning for possible cerebral amyloid angiopathy. Neurology consulted. EEG was ordered for worsening confusion and was negative for seizures. Xarelto was discontinued and recommendation made to consider watchman device. Follow up MRI in 3 months recommended. SBP remained at goal  without  "antihypertensives. Atorvastatin was started.     Updates on Status Since Skilled nursing Admission: Reports feeling weak and \"foggy.\" Denies headache or dizziness. Denies pain of any type. Reports chronic phlegm and coughing at night is unchanged,  no shortness of breath or chest pain. Good appetite.     CODE STATUS/ADVANCE DIRECTIVES DISCUSSION:   DNR / DNI  Patient's living condition: lives at [facility] and has been there for 16 yrs. Receives med administration, meals and assistance with all cares. Uses a cane or walker at baseline. He is his own medical decision maker. Has a sister.   ALLERGIES: Pecan [nuts] and Fluoxetine  PAST MEDICAL HISTORY:  has a past medical history of Alzheimer disease (H), Chronic a-fib (H), CKD (chronic kidney disease) stage 3, GFR 30-59 ml/min (H), COPD (chronic obstructive pulmonary disease) (H), Depression, Frequent falls, and HTN (hypertension).  PAST SURGICAL HISTORY:   Cataract   FAMILY HISTORY: family history is not on file.  SOCIAL HISTORY:   reports that he has never smoked. He has never used smokeless tobacco. He reports that he does not drink alcohol or use drugs.    Post Discharge Medication Reconciliation Status: discharge medications reconciled, continue medications without change    Current Outpatient Medications   Medication Sig Dispense Refill     acetaminophen (TYLENOL) 325 MG tablet Take 650 mg by mouth every 4 hours as needed for mild pain       atorvastatin (LIPITOR) 20 MG tablet Take 1 tablet (20 mg) by mouth every evening       hypromellose (ARTIFICIAL TEARS) 0.5 % SOLN ophthalmic solution Place 1 drop into both eyes every hour as needed for dry eyes       melatonin 3 MG tablet Take 1 mg by mouth At Bedtime       polyethylene glycol (MIRALAX) 17 g packet Take 1 packet by mouth daily as needed for constipation       psyllium (METAMUCIL/KONSYL) Packet Take 1 packet by mouth daily as needed for constipation       senna-docusate (SENNA S) 8.6-50 MG tablet " "Take 1 tablet by mouth 2 times daily       venlafaxine (EFFEXOR-ER) 225 MG 24 hr tablet Take 225 mg by mouth daily       vitamin D3 (CHOLECALCIFEROL) 50 mcg (2000 units) tablet Take 1 tablet by mouth daily         ROS:  10 point ROS of systems including Constitutional, Eyes, Respiratory, Cardiovascular, Gastroenterology, Genitourinary, Integumentary, Musculoskeletal, Psychiatric were all negative except for pertinent positives noted in my HPI.    Vitals:  /74   Pulse 61   Temp 97.1  F (36.2  C)   Resp 18   Ht 1.778 m (5' 10\")   Wt 92.1 kg (203 lb 1.6 oz)   SpO2 92%   BMI 29.14 kg/m    Exam:  GENERAL APPEARANCE:  Alert, in no distress  ENT:  [place]  EYES:  EOM normal, conjunctiva and lids normal, PERRL  NECK:  No adenopathy,masses or thyromegaly  RESP:  respiratory effort and palpation of chest normal, lungs clear to auscultation , no respiratory distress  CV:  Palpation and auscultation of heart done , irregular rate and rhythm, no murmur, no edema, +2 pedal pulses  ABDOMEN:  soft, non-tender, no distension, no masses  M/S:   wheelchair. KAT with good strength  SKIN:  bruising right knee and right forearm. No open areas, no rashes  PSYCH:  oriented to self, place, situation, insight and judgement impaired, memory impaired , affect and mood normal    Lab/Diagnostic data:  Recent labs in [facility] reviewed by me today.     ASSESSMENT/PLAN:  (I61.9) Intraparenchymal  hemorrhage (H)  (primary encounter diagnosis)  (R29.6) Frequent falls  Comment: traumatic hemorrhage after a mechanical fall at home. MRI findings concerning for possible cerebral amyloid angiopathy. Xarelto was discontinued. Cognition and functional status are not at baseline.   Records indicate a history of HTN. He is not currently on antihypertensives with BPs: 116/74, 126/71, 130/70, 112/69   Plan: SPEECH THERAPY eval and treat for cognition. Follow up Neurology 8 weeks and repeat MRI 3 months.     (I48.20) Chronic a-fib (H)  Comment: not " "requiring rate control with HR: 61-74. Xarelto discontinued due to intracranial hemorrhage. Neurology recommended considering watchman device.   Plan: monitor VS.     (G30.1,  F02.80) Late onset Alzheimer's disease without behavioral disturbance (H)  Comment: fair  historian. He recognizes that his cognition is not at baseline. He has a sister, \"but she's not involved.\" He is his own medical decision maker.   Plan: cognitive testing per therapies. Staff to assist with cares.     (N18.3) CKD (chronic kidney disease) stage 3, GFR 30-59 ml/min  Comment: renal function at baseline with creatinine 0.97 on 9/20/2020   Plan: follow BMP prn. Avoid nephrotoxins.     (J43.9) Pulmonary emphysema, unspecified emphysema type (H)  Comment: per history. He denies respiratory symptoms and is not on chronic pulmonary meds   Plan: monitor respiratory status     Dyslipidemia  Comment: atorvastatin was started for cholesterol 180, , non , HDL 40.   Plan: continue statin     (F32.9) Depression, unspecified depression type  Comment: appears well managed. Pleasant and talkative.   Plan: continue venlafaxine     (K59.01) Slow transit constipation  Comment: chronic, managed   Plan: continue bowel regimen     (R53.81) Physical deconditioning  Comment: due to acute injury, hospitalization, multiple comorbidities. He uses a walker or cane at baseline.   Plan: PHYSICAL THERAPY/OT. Goal is to return to [facility] with home care services.     (Z71.89) Advanced directives, counseling/discussion  Comment: reports that he has a healthcare directive and requests DNR/DNI.   Plan: POLST completed         Electronically signed by:  NARESH Sanchez CNP                     "

## 2020-09-22 NOTE — PLAN OF CARE
Discharge Planner PT    Patient plan for discharge: None stated.   Current status: Pt was sleeping upon arrival but able to awaken and pt agreeable to PT.  Increased time needed for transfer supine to sit as pt would initiate transfer and then stop.  C/o increased fatigue.  Eventually sat on EOB with min assist.  Transferred sit to/from stand with min assist.  Gait training x 80 ft using wheeled walker and CGA.  Frequent cues needed to  his feet and take bigger steps with brief improvement noted.    Barriers to return to prior living situation: Impaired cognition, Needs assist for all mobility, currently needs more assist than he was needing at AL.   Recommendations for discharge: ARU per plan established by the PT.   Rationale for recommendations: Patient would benefit from continued skilled PT to progress his independence prior to discharging back to AL. Anticipate he would tolerate 3+ hours therapy daily and is motivated to progress. If progresses quickly as inpatient and AL can provide 24/7 assist needed, then could discharge back to AL with HHPT and OT. Addendum: note [facility] will not accept patient due to cognition; patient will benefit from skilled PT services in TCU setting.       Entered by: Corine Pereira 09/22/2020 9:25 AM         Pt discharged to TCU.  PT goals not met.

## 2020-09-22 NOTE — PLAN OF CARE
Occupational Therapy Discharge Summary    Reason for therapy discharge:    Discharged to transitional care facility.    Progress towards therapy goal(s). See goals on Care Plan in [facility] electronic health record for goal details.  Goals not met.  Barriers to achieving goals:   discharge from facility.    Therapy recommendation(s):    Continued therapy is recommended.  Rationale/Recommendations:  Skilled OT in TCU setting to address safety and independence in I/ADLs.

## 2020-09-23 ENCOUNTER — HOSPITAL LABORATORY (OUTPATIENT)
Dept: OTHER | Facility: CLINIC | Age: 85
End: 2020-09-23

## 2020-09-24 ENCOUNTER — DOCUMENTATION ONLY (OUTPATIENT)
Dept: OTHER | Facility: CLINIC | Age: 85
End: 2020-09-24

## 2020-09-24 LAB
SARS-COV-2 RNA SPEC QL NAA+PROBE: NOT DETECTED
SPECIMEN SOURCE: NORMAL

## 2020-09-24 NOTE — PROGRESS NOTES
[facility]  INITIAL VISIT NOTE  September 25, 2020    PRIMARY CARE PROVIDER AND CLINIC:  Services, [facility], [address] [address]    CHIEF COMPLAINT:  Hospital follow-up/Initial visit    HPI:    Maricarmen Lovell is a 86 year old  (4/8/1934) male who was seen at [facility] on September 25, 2020 for an initial visit.     Medical history is notable for Alzheimer's dementia, hypertension, dyslipidemia, atrial fibrillation, CKD stage III, CVA, depression, emphysema, and impaired fasting glucose.    Summary of hospital course:  Patient was hospitalized at [facility] from September 16 through September 21, 2020 after he presented for evaluation after a fall.  BMP was significant for creatinine of 1.1.  CBC showed white count of 6.7 and hemoglobin of 14.6.  UA was slightly abnormal but urine culture grew less than 10,000 colonies per mL urogenital david consistent with contamination.  EKG showed atrial fibrillation with a heart rate of 57 bpm.  CT head demonstrated a small acute intraparenchymal hematoma within the right basal ganglia/prefrontal region inferior to the caudate nucleus with no significant mass-effect.  CT angiogram of the head was a normal examination.  MRI of the brain was significant for intraparenchymal hemorrhage in the anterior right basal ganglia, mild surrounding edema without significant mass-effect or midline shift, several areas in both cerebral hemispheres suggesting sequela of previous intraparenchymal hemorrhages including subarachnoid hemorrhage in the left parietal lobe concerning for possible cerebral amyloid angiopathy, developmental venous anomaly in the right frontal lobe, and moderate diffuse parenchymal volume loss and white matter changes likely due to chronic microvascular ischemic disease.  Xarelto was held and he received Kcentra and admission.  Video EEG on September 19 showed finding consistent with  moderate diffuse encephalopathy with no epileptiform discharges or seizures.  In view of cerebral amyloid angiopathy, PTA Xarelto was discontinued.      Patient is admitted to this facility for medical management, nursing care, and rehab.     Today's visit:  Patient was seen today in his room, while sitting comfortably on the edge of the bed.  He appears weak.  He reports no headache, double vision, abnormal speech, or focal weakness.  He denies fever, chills, chest pain, palpitation, dyspnea, nausea, vomiting, abdominal pain, or urinary symptoms.  He had a bowel movement earlier today which was slightly loose.    CODE STATUS:   DNR / DNI    PAST MEDICAL HISTORY:   Alzheimer's dementia  Hypertension  Dyslipidemia  Chronic atrial fibrillation, on Xarelto  CKD stage III, baseline creatinine 1-1.1  CVA  Lung emphysema  Depression  Prediabetes  Skin cancer on nose  Meningioma  Cerebral amyloid angiopathy  Fall with intraparenchymal hemorrhage in the right basal ganglia region, in September 2020 as outlined in HPI    Past Medical History:   Diagnosis Date     Alzheimer disease (H)      Chronic a-fib (H)      CKD (chronic kidney disease) stage 3, GFR 30-59 ml/min (H)      COPD (chronic obstructive pulmonary disease) (H)      Depression      Frequent falls      HTN (hypertension)        PAST SURGICAL HISTORY:   Tonsillectomy  Cataract removal    FAMILY HISTORY:   Significant for depression in his mother.    SOCIAL HISTORY:  Patient lives in an apartment.  He does use a cane or walker for ambulation.    Social History     Tobacco Use     Smoking status: Never Smoker     Smokeless tobacco: Never Used   Substance Use Topics     Alcohol use: Never     Frequency: Never       MEDICATIONS:  Current Outpatient Medications   Medication Sig Dispense Refill     acetaminophen (TYLENOL) 325 MG tablet Take 650 mg by mouth every 4 hours as needed for mild pain       atorvastatin (LIPITOR) 20 MG tablet Take 1 tablet (20 mg) by mouth every  evening       hypromellose (ARTIFICIAL TEARS) 0.5 % SOLN ophthalmic solution Place 1 drop into both eyes every hour as needed for dry eyes       melatonin 3 MG tablet Take 1 mg by mouth At Bedtime       polyethylene glycol (MIRALAX) 17 g packet Take 1 packet by mouth daily as needed for constipation       psyllium (METAMUCIL/KONSYL) Packet Take 1 packet by mouth daily as needed for constipation       senna-docusate (SENNA S) 8.6-50 MG tablet Take 1 tablet by mouth 2 times daily       venlafaxine (EFFEXOR-ER) 225 MG 24 hr tablet Take 225 mg by mouth daily       vitamin D3 (CHOLECALCIFEROL) 50 mcg (2000 units) tablet Take 1 tablet by mouth daily         ALLERGIES:  Allergies   Allergen Reactions     Pecan [Nuts] Anaphylaxis     Fluoxetine Nausea       ROS:  10 point ROS neg other than the symptoms noted above in the HPI.    PHYSICAL EXAM:  Vital signs were reviewed in the chart.  Blood pressure 130/75, heart rate 75, respiratory rate 18, temperature 97.3  F, oxygen saturation 94%, weight 203 LBS  Gen: Weak appearing but comfortable  HEENT: Normocephalic; oropharynx clear  Card: Normal S1, S2, irregularly irregular rhythm  Resp: Lungs clear to auscultation bilaterally  GI: Abdomen soft, non-tender, non-distended, +BS  Ext: No significant LE edema  Neuro: CX II-XII grossly intact; ROM in all four extremities grossly intact  Psych: Alert and oriented almost x3; normal affect  Skin: No acute rash    LABORATORY/IMAGING DATA:    Most Recent 3 CBC's:  Recent Labs   Lab Test 09/20/20  0737 09/19/20  0803 09/18/20  1057   WBC 6.6 8.1 7.5   HGB 15.1 15.0 15.7   MCV 97 97 97    212 213     Most Recent 3 BMP's:  Recent Labs   Lab Test 09/20/20  0737 09/19/20  0803 09/18/20  1057    140 140   POTASSIUM 3.7 3.9 4.3   CHLORIDE 107 109 108   CO2 27 27 30   BUN 23 20 15   CR 0.97 1.05 1.00   ANIONGAP 5 4 2*   BEATRIZ 8.5 8.4* 8.6   * 98 100*     Most Recent 2 LFT's:No lab results found.  Most Recent 3 INR's:  Recent  Labs   Lab Test 09/17/20  0431 09/16/20  1511 09/16/20  1014   INR 1.24* 1.74* 1.59*       ASSESSMENT/PLAN:  Intracranial hemorrhage in the right basal ganglia region,  Cerebral amyloid angiopathy,  Frequent falls.  Patient was treated with Kcentra in the hospital.  Xarelto was discontinued.  No focal deficit on gross examination.  Plan:  Fall precautions  Continue PT/OT evaluation and therapy  Follow-up with neurology in 8 weeks and repeat MRI in 3 months    Permanent atrial fibrillation.  Heart rate is controlled.  Patient is not on rate control agent.  PTA Xarelto was discontinued in the hospital given intracranial hemorrhage and cerebral amyloid angiopathy.  Plan:  Patient is probably a candidate for watchman device.  This can be discussed with neurology in follow-up.  Monitor heart rate    Hypertension.  By history. Blood pressure is well controlled.  He is not on any blood pressure medication.  Plan  Monitor blood pressure    CKD stage III.  Baseline creatinine 1-1.1.  Most recent creatinine was stable at 0.97 on September 20.  Plan:  Avoid NSAIDs and nephrotoxins  Monitor renal function periodically    Pulmonary emphysema.  Asymptomatic  Plan:  Monitor respiratory status    Dyslipidemia.  Most recent fasting lipid profile on September 18 showed total cholesterol of 180, LDL of 125, and HDL of 40.  Plan:  Continue atorvastatin 20 mg p.o. at bedtime    Depression.  Chronic and stable.  Plan:  Continue PTA venlafaxine  mg p.o. daily    Prediabetes.  Last Hgb A1c was 5.8% in August 2016.  Blood glucose levels are in the range of .  Plan  No indication for aggressive management due to advanced age and dementia  Monitor blood glucose PRN    Alzheimer's dementia, late onset.  Reportedly, his cognition is still not back to baseline after fall/intracranial hemorrhage.  Plan:  Cognitive evaluation by OT  Staff to assist with cares    Physical deconditioning.  Plan:  Continue PT/OT evaluation and  therapy      Orders written by provider at facility:  None        Electronically signed by:  Erick Fernando MD

## 2020-09-25 ENCOUNTER — NURSING HOME VISIT (OUTPATIENT)
Dept: GERIATRICS | Facility: CLINIC | Age: 85
End: 2020-09-25
Payer: COMMERCIAL

## 2020-09-25 VITALS
DIASTOLIC BLOOD PRESSURE: 75 MMHG | HEART RATE: 75 BPM | TEMPERATURE: 97.8 F | WEIGHT: 203 LBS | SYSTOLIC BLOOD PRESSURE: 113 MMHG | RESPIRATION RATE: 18 BRPM | BODY MASS INDEX: 29.06 KG/M2 | OXYGEN SATURATION: 94 % | HEIGHT: 70 IN

## 2020-09-25 DIAGNOSIS — F02.80 LATE ONSET ALZHEIMER'S DISEASE WITHOUT BEHAVIORAL DISTURBANCE (H): ICD-10-CM

## 2020-09-25 DIAGNOSIS — N18.30 CKD (CHRONIC KIDNEY DISEASE) STAGE 3, GFR 30-59 ML/MIN (H): ICD-10-CM

## 2020-09-25 DIAGNOSIS — E78.5 DYSLIPIDEMIA: ICD-10-CM

## 2020-09-25 DIAGNOSIS — I68.0 CEREBRAL AMYLOID ANGIOPATHY (H): ICD-10-CM

## 2020-09-25 DIAGNOSIS — E85.4 CEREBRAL AMYLOID ANGIOPATHY (H): ICD-10-CM

## 2020-09-25 DIAGNOSIS — F32.A DEPRESSION, UNSPECIFIED DEPRESSION TYPE: ICD-10-CM

## 2020-09-25 DIAGNOSIS — G30.1 LATE ONSET ALZHEIMER'S DISEASE WITHOUT BEHAVIORAL DISTURBANCE (H): ICD-10-CM

## 2020-09-25 DIAGNOSIS — J43.9 PULMONARY EMPHYSEMA, UNSPECIFIED EMPHYSEMA TYPE (H): ICD-10-CM

## 2020-09-25 DIAGNOSIS — R53.81 PHYSICAL DECONDITIONING: ICD-10-CM

## 2020-09-25 DIAGNOSIS — R73.03 PREDIABETES: ICD-10-CM

## 2020-09-25 DIAGNOSIS — I61.9 INTRAPARENCHYMAL HEMORRHAGE OF BRAIN (H): Primary | ICD-10-CM

## 2020-09-25 DIAGNOSIS — R29.6 FREQUENT FALLS: ICD-10-CM

## 2020-09-25 DIAGNOSIS — I48.21 PERMANENT ATRIAL FIBRILLATION (H): ICD-10-CM

## 2020-09-25 DIAGNOSIS — Z86.79 HISTORY OF HYPERTENSION: ICD-10-CM

## 2020-09-25 PROCEDURE — 99305 1ST NF CARE MODERATE MDM 35: CPT | Mod: AI | Performed by: INTERNAL MEDICINE

## 2020-09-25 ASSESSMENT — MIFFLIN-ST. JEOR: SCORE: 1607.05

## 2020-09-28 ENCOUNTER — HOSPITAL LABORATORY (OUTPATIENT)
Dept: OTHER | Facility: CLINIC | Age: 85
End: 2020-09-28

## 2020-09-29 ENCOUNTER — NURSING HOME VISIT (OUTPATIENT)
Dept: GERIATRICS | Facility: CLINIC | Age: 85
End: 2020-09-29
Payer: COMMERCIAL

## 2020-09-29 VITALS
BODY MASS INDEX: 29.06 KG/M2 | HEIGHT: 70 IN | HEART RATE: 68 BPM | RESPIRATION RATE: 18 BRPM | OXYGEN SATURATION: 96 % | SYSTOLIC BLOOD PRESSURE: 120 MMHG | TEMPERATURE: 96.8 F | WEIGHT: 203 LBS | DIASTOLIC BLOOD PRESSURE: 82 MMHG

## 2020-09-29 DIAGNOSIS — R29.6 FREQUENT FALLS: ICD-10-CM

## 2020-09-29 DIAGNOSIS — I61.9 INTRAPARENCHYMAL HEMORRHAGE OF BRAIN (H): Primary | ICD-10-CM

## 2020-09-29 DIAGNOSIS — N18.30 CKD (CHRONIC KIDNEY DISEASE) STAGE 3, GFR 30-59 ML/MIN (H): ICD-10-CM

## 2020-09-29 DIAGNOSIS — J43.9 PULMONARY EMPHYSEMA, UNSPECIFIED EMPHYSEMA TYPE (H): ICD-10-CM

## 2020-09-29 DIAGNOSIS — F02.80 LATE ONSET ALZHEIMER'S DISEASE WITHOUT BEHAVIORAL DISTURBANCE (H): ICD-10-CM

## 2020-09-29 DIAGNOSIS — I48.20 CHRONIC A-FIB (H): ICD-10-CM

## 2020-09-29 DIAGNOSIS — G30.1 LATE ONSET ALZHEIMER'S DISEASE WITHOUT BEHAVIORAL DISTURBANCE (H): ICD-10-CM

## 2020-09-29 DIAGNOSIS — K59.01 SLOW TRANSIT CONSTIPATION: ICD-10-CM

## 2020-09-29 DIAGNOSIS — R53.81 PHYSICAL DECONDITIONING: ICD-10-CM

## 2020-09-29 LAB
SARS-COV-2 RNA SPEC QL NAA+PROBE: NOT DETECTED
SPECIMEN SOURCE: NORMAL

## 2020-09-29 PROCEDURE — 99309 SBSQ NF CARE MODERATE MDM 30: CPT | Performed by: NURSE PRACTITIONER

## 2020-09-29 ASSESSMENT — MIFFLIN-ST. JEOR: SCORE: 1607.05

## 2020-09-29 NOTE — PROGRESS NOTES
"[facility]  [place] [facility] Record Number:  [id number]  [facility] of Service where encounter took place:  [facility] [[id number]]  Chief Complaint   Patient presents with     RECHECK       HPI:    Maricarmen Lovell  is a 86 year old (4/8/1934), who is being seen today for an episodic care visit.  HPI information obtained from: facility chart records, facility staff, patient report and [facility] chart review.  He came to this facility 9/21/2020 for short term rehab and medical management following hospitalization after presenting to the ED from his AL with a mechanical fall. He was coming out of the bathroom, turned around and fell. He hit his head, but did not lose consciousness. EKG showed afib with slow ventricular response, HR 57. 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 Labs were within range with Hgb 14.6 and creatinine 1.10. INR was 1.59 and Kcentra was given. MRI brain 9/17/2020 showed intraparenchymal hemorrhage in the anterior right basal ganglia with mild surrounding edema, no significant mass effect or midline shift. Several areas in both cerebral hemispheres suggesting previous hemorrhages, concerning for possible cerebral amyloid angiopathy. Neurology consulted. EEG was ordered for worsening confusion and was negative for seizures. Xarelto was discontinued and recommendation made to consider watchman device. Follow up MRI in 3 months recommended. SBP remained at goal  without antihypertensives. Atorvastatin was started.     Today's concern is:     Intraparenchymal hemorrhage of brain (H)-reports feeling better,  but \"still off.\" Feels less confused. Denies headache or pain of any type. He's eager to return.   Frequent falls  Chronic a-fib (H)-HR: 70-9   Late onset Alzheimer's disease without behavioral disturbance " "(H)-pleasant and conversant. Poor historian. SLUMS 21/30   CKD (chronic kidney disease) stage 3, GFR 30-59 ml/min  Pulmonary emphysema, unspecified emphysema type (H)-denies cough, shortness of breath or chest pain   Slow transit constipation-having regular BMs  Physical deconditioning-ambulating 120 ft with walker and stand by assist. Requires stand by to contact guard assist with transfers and cares.     Past Medical and Surgical History reviewed in Epic today.    MEDICATIONS:    Current Outpatient Medications   Medication Sig Dispense Refill     acetaminophen (TYLENOL) 325 MG tablet Take 650 mg by mouth every 4 hours as needed for mild pain       atorvastatin (LIPITOR) 20 MG tablet Take 1 tablet (20 mg) by mouth every evening       hypromellose (ARTIFICIAL TEARS) 0.5 % SOLN ophthalmic solution Place 1 drop into both eyes every hour as needed for dry eyes       melatonin 3 MG tablet Take 1 mg by mouth At Bedtime       polyethylene glycol (MIRALAX) 17 g packet Take 1 packet by mouth daily as needed for constipation       psyllium (METAMUCIL/KONSYL) Packet Take 1 packet by mouth daily as needed for constipation       senna-docusate (SENNA S) 8.6-50 MG tablet Take 1 tablet by mouth 2 times daily       venlafaxine (EFFEXOR-ER) 225 MG 24 hr tablet Take 225 mg by mouth daily       vitamin D3 (CHOLECALCIFEROL) 50 mcg (2000 units) tablet Take 1 tablet by mouth daily         REVIEW OF SYSTEMS:  4 point ROS including Respiratory, CV, GI and , other than that noted in the HPI,  is negative    Objective:  /82   Pulse 68   Temp 96.8  F (36  C)   Resp 18   Ht 1.778 m (5' 10\")   Wt 92.1 kg (203 lb)   SpO2 96%   BMI 29.13 kg/m    Exam:  GENERAL APPEARANCE:  Alert, in no distress  ENT:  [place]  EYES:  EOM normal, conjunctiva and lids normal   NECK:  No adenopathy,masses or thyromegaly  RESP:  respiratory effort and palpation of chest normal, lungs clear to auscultation , no respiratory distress  CV:  Palpation and " auscultation of heart done , irregular rate and rhythm, no murmur, no edema, +2 pedal pulses  ABDOMEN:  soft, non-tender, no distension   M/S:  in bed. KAT with good strength  SKIN:  no rashes or open areas   PSYCH:  oriented to self, place, situation, insight and judgement impaired, memory impaired , affect and mood normal    Labs:   Recent labs in [facility] reviewed by me today.     ASSESSMENT/PLAN:  (I61.9) Intraparenchymal hemorrhage of brain (H)  (primary encounter diagnosis)  (R29.6) Frequent falls  Comment: traumatic hemorrhage after a mechanical fall at home. MRI findings concerning for possible cerebral amyloid angiopathy. Xarelto was discontinued. Cognition and functional status slowly improving. BPs acceptable: 102/68, 116/80, 109/70   Plan: continue SPEECH THERAPY for cognition. Neurology follow up with MRI as scheduled.     (I48.20) Chronic a-fib (H)  Comment: Xarelto discontinued due to intracranial hemorrhage. Not requiring rate control.   Plan: monitor VS. Neurology recommends considering watchman device in the future.     (G30.1,  F02.80) Late onset Alzheimer's disease without behavioral disturbance (H)  Comment: mild to moderate deficits. Pleasant and cooperative  Plan: staff to assist with cares.     (N18.3) CKD (chronic kidney disease) stage 3, GFR 30-59 ml/min  Comment: renal function at baseline with creatinine 0.97 on 9/20/2020  Plan: follow BMP. Avoid nephrotoxins     (J43.9) Pulmonary emphysema, unspecified emphysema type (H)  Comment: no respiratory issues. He is not on pulmonary meds   Plan: monitor respiratory status     (K59.01) Slow transit constipation  Comment: managed   Plan: continue bowel regimen     (R53.81) Physical deconditioning  Comment: progressing in therapies   Plan: continue PHYSICAL THERAPY/OT. Goal is to return to [facility] with services. Care conference is tomorrow.       Electronically signed by:  NARESH Sanchez CNP

## 2020-10-01 ENCOUNTER — TELEPHONE (OUTPATIENT)
Dept: NEUROSURGERY | Facility: CLINIC | Age: 85
End: 2020-10-01

## 2020-10-01 NOTE — TELEPHONE ENCOUNTER
LVM for patient to return call [facility] to schedule  a stoke follow up appointment in 7 weeks from 10/1/20.

## 2020-10-07 ENCOUNTER — HOSPITAL LABORATORY (OUTPATIENT)
Dept: OTHER | Facility: CLINIC | Age: 85
End: 2020-10-07

## 2020-10-07 ENCOUNTER — DISCHARGE SUMMARY NURSING HOME (OUTPATIENT)
Dept: GERIATRICS | Facility: CLINIC | Age: 85
End: 2020-10-07
Payer: COMMERCIAL

## 2020-10-07 VITALS
HEART RATE: 65 BPM | TEMPERATURE: 96.8 F | DIASTOLIC BLOOD PRESSURE: 75 MMHG | WEIGHT: 183 LBS | OXYGEN SATURATION: 96 % | HEIGHT: 70 IN | RESPIRATION RATE: 18 BRPM | SYSTOLIC BLOOD PRESSURE: 112 MMHG | BODY MASS INDEX: 26.2 KG/M2

## 2020-10-07 DIAGNOSIS — N18.30 CKD (CHRONIC KIDNEY DISEASE) STAGE 3, GFR 30-59 ML/MIN (H): ICD-10-CM

## 2020-10-07 DIAGNOSIS — I48.20 CHRONIC A-FIB (H): ICD-10-CM

## 2020-10-07 DIAGNOSIS — J43.9 PULMONARY EMPHYSEMA, UNSPECIFIED EMPHYSEMA TYPE (H): ICD-10-CM

## 2020-10-07 DIAGNOSIS — F02.80 LATE ONSET ALZHEIMER'S DISEASE WITHOUT BEHAVIORAL DISTURBANCE (H): ICD-10-CM

## 2020-10-07 DIAGNOSIS — I61.9 INTRAPARENCHYMAL HEMORRHAGE OF BRAIN (H): Primary | ICD-10-CM

## 2020-10-07 DIAGNOSIS — K59.01 SLOW TRANSIT CONSTIPATION: ICD-10-CM

## 2020-10-07 DIAGNOSIS — G30.1 LATE ONSET ALZHEIMER'S DISEASE WITHOUT BEHAVIORAL DISTURBANCE (H): ICD-10-CM

## 2020-10-07 DIAGNOSIS — R29.6 FREQUENT FALLS: ICD-10-CM

## 2020-10-07 DIAGNOSIS — R53.81 PHYSICAL DECONDITIONING: ICD-10-CM

## 2020-10-07 PROCEDURE — 99316 NF DSCHRG MGMT 30 MIN+: CPT | Performed by: NURSE PRACTITIONER

## 2020-10-07 ASSESSMENT — MIFFLIN-ST. JEOR: SCORE: 1516.33

## 2020-10-07 NOTE — PROGRESS NOTES
Documentation of Face-to-Face and Certification for Home Health Services     Patient: Maricarmen Lovell   YOB: 1934  MR Number: [id number]  Today's Date: 10/7/2020    I certify that patient: Maricarmen Lovell is under my care and that I, or a nurse practitioner or physician's assistant working with me, had a face-to-face encounter that meets the physician face-to-face encounter requirements with this patient on: 10/7/2020.    This encounter with the patient was in whole, or in part, for the following medical condition, which is the primary reason for home health care: intraparenchymal hemorrhage .    I certify that, based on my findings, the following services are medically necessary home health services: Nursing, Occupational Therapy, Physical Therapy and HHA.    My clinical findings support the need for the above services because: Nurse is needed: To assess VS, neuro status after changes in medications or other medical regimen., To provide assessment and oversight required in the home to assure adherence to the medical plan due to: at risk for rehospitalization. and To provide caregiver training to assist with: med management.., Occupational Therapy Services are needed to assess and treat cognitive ability and address ADL safety due to impairment in functional status and cognition. and Physical Therapy Services are needed to assess and treat the following functional impairments: gait instability, limited endurance.    Further, I certify that my clinical findings support that this patient is homebound (i.e. absences from home require considerable and taxing effort and are for medical reasons or [employer] services or infrequently or of short duration when for other reasons) because: Requires assistance of another person or specialized equipment to access medical services because patient: Is prone to wander/get lost without assistance., Is unable to exit home safely on own due to: gait instability,  cognitive impairment., Is unable to operate assistive equipment on their own. and Is unable to walk greater than 200 feet without rest...    Based on the above findings. I certify that this patient is confined to the home and needs intermittent skilled nursing care, physical therapy and/or speech therapy.  The patient is under my care, and I have initiated the establishment of the plan of care.  This patient will be followed by a physician who will periodically review the plan of care.  Physician/Provider to provide follow up care: Tyree Lyons Physician    Responsible Medicare certified PECOS Physician: Dr.Mohammad Abdullahi MD, signing F2F and only signing for initial order. Please send all follow up questions and concerns or needed follow up signatures to the PCP, who [facility] has on file as:  Tyree Lyons Physician.  Physician Signature: See electronic signature associated with these discharge orders.  Date: 10/7/2020

## 2020-10-07 NOTE — PATIENT INSTRUCTIONS
[facility] Discharge Orders    Name: Maricarmen Lovell  : 1934  Planned Discharge Date: 10/8/2020  Discharged to: previous assisted living, [facility]     MEDICAL FOLLOW UP  Follow up with Tyree PCP within 1-2 weeks   Follow up with Specialists: Neurology virtual visit 2020. MRI in 3 months per Neurology     ORDER CHANGES:  Xarelto in on hold pending Neurology follow up     DISCHARGE MEDICATIONS:  The patient s pharmacy is authorized to dispense a 30-day supply of medications. Refill requests should be directed to the primary provider, Services, Tyree Physician.     Current Outpatient Medications   Medication Sig Dispense Refill     acetaminophen (TYLENOL) 325 MG tablet Take 650 mg by mouth every 4 hours as needed for mild pain       atorvastatin (LIPITOR) 20 MG tablet Take 1 tablet (20 mg) by mouth every evening       hypromellose (ARTIFICIAL TEARS) 0.5 % SOLN ophthalmic solution Place 1 drop into both eyes every hour as needed for dry eyes       melatonin 3 MG tablet Take 1 mg by mouth At Bedtime       polyethylene glycol (MIRALAX) 17 g packet Take 1 packet by mouth daily as needed for constipation       psyllium (METAMUCIL/KONSYL) Packet Take 1 packet by mouth daily as needed for constipation       senna-docusate (SENNA S) 8.6-50 MG tablet Take 1 tablet by mouth 2 times daily       venlafaxine (EFFEXOR-ER) 225 MG 24 hr tablet Take 225 mg by mouth daily       vitamin D3 (CHOLECALCIFEROL) 50 mcg (2000 units) tablet Take 1 tablet by mouth daily         SERVICES:  Home Care:  Occupational Therapy, Physical Therapy, Registered Nurse, Home Health Aide and From:  Home Health Care [facility]         NARESH Sanchez CNP  This document was electronically signed on 2020

## 2020-10-07 NOTE — PROGRESS NOTES
[facility] DISCHARGE SUMMARY  PATIENT'S NAME: Maricarmen Lovell  YOB: 1934  MEDICAL RECORD NUMBER:  [id number]  [facility] of [facility] where encounter took place[facility] [[id number]]    PRIMARY CARE PROVIDER AND CLINIC RESPONSIBLE AFTER TRANSFER:   [facility], [address] [place] [id number]    Assisted Living: [facility]       Transferring providers: NARESH Sanchez CNP, Erick Fernando MD  Recent Hospitalization/ED:  [facility] Hospital stay 9/16/2020 to 9/21/2020.  Date of SNF Admission: September / 21 / 2020  Date of SNF (anticipated) Discharge: October / 08 / 2020  Discharged to: previous assisted living for patient [facility]   Cognitive Scores: SLUMS: 21/30  DME: Walker    CODE STATUS/ADVANCE DIRECTIVES DISCUSSION:  DNR / DNI   ALLERGIES: Pecan [nuts] and Fluoxetine    DISCHARGE DIAGNOSIS/NURSING FACILITY COURSE:   He came to this facility 9/21/2020 for short term rehab and medical management following hospitalization after presenting to the ED from his AL with a mechanical fall. He was coming out of the bathroom, turned around and fell. He hit his head, but did not lose consciousness. EKG showed afib with slow ventricular response, HR 57. 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 Labs were within range with Hgb 14.6 and creatinine 1.10. INR was 1.59 and Kcentra was given. MRI brain 9/17/2020 showed intraparenchymal hemorrhage in the anterior right basal ganglia with mild surrounding edema, no significant mass effect or midline shift. Several areas in both cerebral hemispheres suggesting previous hemorrhages, concerning for possible cerebral amyloid angiopathy. Neurology consulted. EEG was ordered for worsening confusion and was  "negative for seizures. Xarelto was discontinued and recommendation made to consider watchman device. SBP remained at goal  without antihypertensives. Atorvastatin was started while inpatient.     He has worked with PHYSICAL THERAPY/OT and SPEECH THERAPY with good progress. Ambulating up to 200 ft with walker and stand by assist. Requires stand by to min assist of 1 with toileting,dressing and bathing. TUG 33 seconds, indicating high fall risk.   ASSESSMENT / PLAN:  (I61.9) Intraparenchymal hemorrhage of brain (H)  (primary encounter diagnosis)  (R29.6) Frequent falls  Comment: traumatic hemorrhage after mechanical fall, in the setting of chronic anticoagulation. MRI findings as above.  Xarelto was discontinued. Cognition and functional status have gradually improved. Tolerating regular diet with thin liquids. He is feeling well and eager to return home. BPs have been within range without antihypertensives: 115/74, 133/68, 111/69   Plan: discharge back to [facility] with high level services. Continue statin. Home care services and follow up as below.     Note: COVID-19 test was negative 9/23 and 9/28/2020. Result from 10/7/2020 is pending.     (I48.20) Chronic a-fib (H)  Comment: not requiring rate control with HR: 61-82. Xarelto was discontinued due to intracranial hemorrhage. Neurology recommends considering watchman device in the future.   Plan: monitor VS    (G30.1,  F02.80) Late onset Alzheimer's disease without behavioral disturbance (H)  Comment: mild to moderate deficits. Cognition appears to be at baseline. He reports feeling \"slow\" in the morning, but improves as the day progresses.   Plan: high level services at AL, including med management and staff assist with all cares     (N18.30) CKD (chronic kidney disease) stage 3, GFR 30-59 ml/min  Comment: renal function at baseline with creatinine 0.97 on 9/20/2020  Plan: follow up labs per PCP     (J43.9) Pulmonary emphysema, unspecified emphysema type " "(H)  Comment: no respiratory issues. He is not on chronic pulmonary meds.   Plan: monitor respiratory status     (K59.01) Slow transit constipation  Comment: chronic, managed   Plan: continue bowel regimen     (R53.81) Physical deconditioning  Comment: progress in therapies as above   Plan: home therapies for ongoing gait training, strengthening and ADL safety       Past Medical History:  has a past medical history of Alzheimer disease (H), Chronic a-fib (H), CKD (chronic kidney disease) stage 3, GFR 30-59 ml/min, COPD (chronic obstructive pulmonary disease) (H), Depression, Frequent falls, and HTN (hypertension).    Discharge Medications:    Current Outpatient Medications   Medication Sig Dispense Refill     acetaminophen (TYLENOL) 325 MG tablet Take 650 mg by mouth every 4 hours as needed for mild pain       atorvastatin (LIPITOR) 20 MG tablet Take 1 tablet (20 mg) by mouth every evening       hypromellose (ARTIFICIAL TEARS) 0.5 % SOLN ophthalmic solution Place 1 drop into both eyes every hour as needed for dry eyes       melatonin 3 MG tablet Take 1 mg by mouth At Bedtime       polyethylene glycol (MIRALAX) 17 g packet Take 1 packet by mouth daily as needed for constipation       psyllium (METAMUCIL/KONSYL) Packet Take 1 packet by mouth daily as needed for constipation       senna-docusate (SENNA S) 8.6-50 MG tablet Take 1 tablet by mouth 2 times daily       venlafaxine (EFFEXOR-ER) 225 MG 24 hr tablet Take 225 mg by mouth daily       vitamin D3 (CHOLECALCIFEROL) 50 mcg (2000 units) tablet Take 1 tablet by mouth daily         Medication Changes/Rationale:     No medication changes made while on tcu     Controlled medications sent with patient:   not applicable/none     ROS:   4 point ROS including Respiratory, CV, GI and , other than that noted in the HPI,  is negative    Physical Exam:   Vitals: /75   Pulse 65   Temp 96.8  F (36  C)   Resp 18   Ht 1.778 m (5' 10\")   Wt 83 kg (183 lb)   SpO2 96%   " BMI 26.26 kg/m    BMI= Body mass index is 26.26 kg/m .  GENERAL APPEARANCE:  Alert, in no distress  ENT:  [place]  EYES:  EOM normal, conjunctiva and lids normal   NECK:  No adenopathy,masses or thyromegaly  RESP:  respiratory effort and palpation of chest normal, lungs clear to auscultation , no respiratory distress  CV:  Palpation and auscultation of heart done , irregular rate and rhythm, no murmur, no edema, +2 pedal pulses  ABDOMEN:  soft, non-tender, no distension   M/S: up in chair.  KAT with good strength  SKIN:  no rashes or open areas   PSYCH:  oriented to self, place, situation, insight and judgement impaired, memory impaired , affect and mood normal    SNF labs: Labs done in SNF are in [place] [employer]. Please refer to them using [employer]/Care Everywhere.      DISCHARGE PLAN:    Follow up labs: per PCP     Medical Follow Up:      Follow up with primary care provider within 1-2  weeks   Follow up with Neurology virtual visit 11/8/2020. MRI brain in 3 months per Neurology     MTM referral needed and placed by this provider: No    Discharge Services: Home Care:  Occupational Therapy, Physical Therapy, Registered Nurse, Home Health Aide and From:  [facility]      TOTAL DISCHARGE TIME:   Greater than 30 minutes  Electronically signed by:  NARESH Sanchez CNP

## 2020-10-08 LAB
SARS-COV-2 RNA SPEC QL NAA+PROBE: NOT DETECTED
SPECIMEN SOURCE: NORMAL

## 2020-11-18 ENCOUNTER — VIRTUAL VISIT (OUTPATIENT)
Dept: NEUROSURGERY | Facility: CLINIC | Age: 85
End: 2020-11-18
Attending: PHYSICIAN ASSISTANT
Payer: COMMERCIAL

## 2020-11-18 VITALS — BODY MASS INDEX: 27.09 KG/M2 | HEIGHT: 72 IN | WEIGHT: 200 LBS

## 2020-11-18 DIAGNOSIS — I62.9 INTRACRANIAL HEMORRHAGE (H): Primary | ICD-10-CM

## 2020-11-18 PROCEDURE — 99213 OFFICE O/P EST LOW 20 MIN: CPT | Mod: 95 | Performed by: PHYSICIAN ASSISTANT

## 2020-11-18 ASSESSMENT — MIFFLIN-ST. JEOR: SCORE: 1625.19

## 2020-11-18 NOTE — PROGRESS NOTES
"Maricarmen Lovell is a 86 year old male who is being evaluated via a billable video visit.      The patient has been notified of following:     \"This video visit will be conducted via a call between you and your physician/provider. We have found that certain health care needs can be provided without the need for an in-person physical exam.  This service lets us provide the care you need with a video conversation.  If a prescription is necessary we can send it directly to your pharmacy.  If lab work is needed we can place an order for that and you can then stop by our lab to have the test done at a later time.    Video visits are billed at different rates depending on your insurance coverage.  Please reach out to your insurance provider with any questions.    If during the course of the call the physician/provider feels a video visit is not appropriate, you will not be charged for this service.\"    Patient has given verbal consent for Video visit? Yes  How would you like to obtain your AVS? Mail a copy  If you are dropped from the video visit, the video invite should be resent to: Text to cell phone: [phone]  Will anyone else be joining your video visit? {:[id number]}  {If patient encounters technical issues they should call [phone] :[id number]}      Video-Visit Details    Type of service:  Video Visit    Video Start Time: {video visit start/end time:152948}  Video End Time: {video visit start/end time:152948}    Originating Location (pt. Location): {video visit patient location:946083::\"Home\"}    Distant Location (provider location):  [facility] NEUROSURGERY [facility]     Platform used for Video Visit: {Virtual Visit Platforms:718532::\"Ce[employer]\"}    {signature options:237661}      "

## 2020-11-18 NOTE — PROGRESS NOTES
"Maricarmen Lovell is a 86 year old male who is being evaluated via a billable video visit.      The patient has been notified of following:     \"This video visit will be conducted via a call between you and your physician/provider. We have found that certain health care needs can be provided without the need for an in-person physical exam.  This service lets us provide the care you need with a video conversation.  If a prescription is necessary we can send it directly to your pharmacy.  If lab work is needed we can place an order for that and you can then stop by our lab to have the test done at a later time.    Video visits are billed at different rates depending on your insurance coverage.  Please reach out to your insurance provider with any questions.    If during the course of the call the physician/provider feels a video visit is not appropriate, you will not be charged for this service.\"    Patient has given verbal consent for Video visit? Yes  How would you like to obtain your AVS? Mail a copy  If you are dropped from the video visit, the video invite should be resent to: Text to cell phone: [phone]  Will anyone else be joining your video visit? No  {If patient encounters technical issues they should call [phone]    Video-Visit Details    Type of service:  Video Visit    Video Start Time: 1300  Video End Time: 1322    Originating Location (pt. Location): Assisted Living    Distant Location (provider location):  [facility] [place]     Platform used for Video Visit: Jailene Leblanc PA-C    _____________________________________________________________      Chief Complaint: Patient presents with:  Neurologic Problem: f/u stroke      History of Present Illness: Maricarmen Lovell is a 86 year old male presenting for follow-up for IP. He was hospitalized at [facility] from 09/16 -09/21/2020. Prior to the hospital stay, he had a past medical history of h/o afib on " Xarelto, prior strokes, memory issues, Alzheimer's disease, HLD who presented on 9/16/2020 after a mechanical fall at home. He was brought to the emergency department where a head CT revealed small right basal ganglia hemorrhage.  Mr. Lovell received PCC for anticoagulation reversal.  Repeat scan showed stability without growth of bleed. CT scan showed no vascular abnormality or obvious underlying lesion. MRI brain was ordered, revealing multiple regions of prior small intraparenchymal hemorrhages and left parietal subarachnoid hemorrhage suggestive of underlying cerebral amyloid angiopathy. During his hospital stay an EEG was ordered for worsening confusion, but showed no evidence of seizures. Given the MRI findings, it was recommended to perhaps stop anticoagulation permanently. My colleague attempted to reach family to discuss this recommendation, but was u unable to reach them.  Mr. Lovell potentially could be a candidate for a watchman device to prevent further stroke given his history of atrial fibrillation and new contraindication to anticoagulation, but given history of dementia and other factors, it makes this options unlikely. It was recommended that Mr. Lovell undergo a repeat MRI with and without contrast as an outpatient in 2 to 3 months.  Mr. Lovell was started on Lipitor 20 mg as treatment for his LDL which was 125, with target LDL goal 40-70.  Since being discharged, He reports he is doing well, currently still not taking Xarelto as recommended at discharge. He appears he has not yet completed the repeat MRI due to the pandemic. Mr. Lovell states that he does not recall the details of the mechanical fall leading to his hospital admission. He states he only remembers a previous fall which did not lead to a hospital admission.  He reports after the first fall, he was examined and cleared by medical provider. Mr. Lovell denies any current headache, focal weakness, numbness, or change in  sensations at this time.  He reports history of intermittent mild headaches, that he reports are alleviated by use of medication.  He reports he uses aspirin and Aleve, but mr. Coronado's nurse was in the room who states he has Tylenol on his medication list. She states there is no aspirin or Aleve on his medication list.    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 Less likely hypertensive or traumatic.    Problem List Items Addressed This Visit        Other    Intracranial hemorrhage (H) - Primary        Plan:   - Recommend restarting prior to admission Xarelto 20 mg daily for medical management of atrial fibrillation. Discussed risk and benefits of re-initiating versus discontinuation permanently of anticoagulation with stroke attending ; Unable to reach family to discuss these risks and benefits of reinitiating anticoagulation medication given concern of cerebral amyloid angiopathy.  If any questions regarding medication reinitiation, please contact stroke clinic.  - Recommend repeat MRI brain with and without contrast, given current circumstances with increased cases of COVID-19, imaging does not need to be completed ASAP, but consider completing within the next 3 months if able. Orders placed  -  Most recent charted LDL (125) with LDL goal 40-70 ; continue Lipitor 20 mg daily, recheck LDL at next primary care provider appointment, titrate medication as needed to reach target goal LDL  - Goal BP <130/80 with tighter control associated with decreased overall CV risk, if tolerated  - Follow up in 3 months with [facility]     Stroke Education provided.  He will call us with any questions.  For any acute neurologic deficits he was advised to  go directly to the hospital rather than call the clinic.    Edel Leblanc PA-C  Neurology  11/18/2020 12:58 PM  To page me or covering stroke neurology  "team member, click here: [facility]COM  Choose \"On Call\" tab at top, then search dropdown box for \"Neurology Adult\" & press Enter, look for Neuro ICU/Stroke    ___________________________________________________________________    Current Medications  Current Outpatient Medications   Medication Sig     atorvastatin (LIPITOR) 20 MG tablet Take 1 tablet (20 mg) by mouth every evening     hypromellose (ARTIFICIAL TEARS) 0.5 % SOLN ophthalmic solution Place 1 drop into both eyes every hour as needed for dry eyes     melatonin 3 MG tablet Take 1 mg by mouth At Bedtime     venlafaxine (EFFEXOR-ER) 225 MG 24 hr tablet Take 225 mg by mouth daily     vitamin D3 (CHOLECALCIFEROL) 50 mcg (2000 units) tablet Take 1 tablet by mouth daily     acetaminophen (TYLENOL) 325 MG tablet Take 650 mg by mouth every 4 hours as needed for mild pain     polyethylene glycol (MIRALAX) 17 g packet Take 1 packet by mouth daily as needed for constipation     psyllium (METAMUCIL/KONSYL) Packet Take 1 packet by mouth daily as needed for constipation     senna-docusate (SENNA S) 8.6-50 MG tablet Take 1 tablet by mouth 2 times daily     No current facility-administered medications for this visit.        Past Medical History  Past Medical History:   Diagnosis Date     Alzheimer disease (H)      Chronic a-fib (H)      CKD (chronic kidney disease) stage 3, GFR 30-59 ml/min      COPD (chronic obstructive pulmonary disease) (H)      Depression      Frequent falls      HTN (hypertension)        Social History  Social History     Tobacco Use     Smoking status: Former Smoker     Smokeless tobacco: Never Used   Substance Use Topics     Alcohol use: Never     Frequency: Never     Drug use: Never       Family History  No family history on file.    ROS: 10 point relevant ROS neg other than the symptoms noted above in the HPI.    Physical Exam    Ht 6' (1.829 m)   Wt 200 lb (90.7 kg)   BMI 27.12 kg/m      General:  no acute distress  HEENT:  " normocephalic/atraumatic  Pulmonary:  no respiratory distress    Neurologic   Mental Status:  alert, oriented x 3, follows commands, speech clear and fluent,  Cranial Nerves:  EOMI with normal smooth pursuit, facial movements symmetric, hearing not formally tested but intact to conversation, no dysarthria  Motor: no abnormal movements, able to move all limbs antigravity spontaneously with no signs of hemiparesis observed, no pronator drift  Reflexes: unable to test (telestroke)  Sensory: unable to test (telestroke)  Coordination: no obvious ataxia   Station/Gait:  unable to test (telestroke)    Neuroimaging: as per HPI. I personally reviewed those images    Labs:    Recent Labs   Lab Test 09/17/20  0431 09/16/20  1511 09/16/20  1014   INR 1.24* 1.74* 1.59*        Recent Labs   Lab Test 09/18/20  1057   CHOL 180   HDL 40   *   TRIG 74       No lab results found.    No lab results found.

## 2021-01-12 ENCOUNTER — APPOINTMENT (OUTPATIENT)
Dept: CT IMAGING | Facility: CLINIC | Age: 86
DRG: 521 | End: 2021-01-12
Attending: EMERGENCY MEDICINE
Payer: COMMERCIAL

## 2021-01-12 ENCOUNTER — HOSPITAL ENCOUNTER (INPATIENT)
Facility: CLINIC | Age: 86
LOS: 4 days | Discharge: NURSING, REHAB OR LONG-TERM CARE | DRG: 521 | End: 2021-01-16
Attending: EMERGENCY MEDICINE | Admitting: INTERNAL MEDICINE
Payer: COMMERCIAL

## 2021-01-12 ENCOUNTER — ANESTHESIA (OUTPATIENT)
Dept: SURGERY | Facility: CLINIC | Age: 86
DRG: 521 | End: 2021-01-12
Payer: COMMERCIAL

## 2021-01-12 ENCOUNTER — APPOINTMENT (OUTPATIENT)
Dept: GENERAL RADIOLOGY | Facility: CLINIC | Age: 86
DRG: 521 | End: 2021-01-12
Attending: EMERGENCY MEDICINE
Payer: COMMERCIAL

## 2021-01-12 ENCOUNTER — ANESTHESIA EVENT (OUTPATIENT)
Dept: SURGERY | Facility: CLINIC | Age: 86
DRG: 521 | End: 2021-01-12
Payer: COMMERCIAL

## 2021-01-12 ENCOUNTER — APPOINTMENT (OUTPATIENT)
Dept: GENERAL RADIOLOGY | Facility: CLINIC | Age: 86
DRG: 521 | End: 2021-01-12
Attending: PHYSICIAN ASSISTANT
Payer: COMMERCIAL

## 2021-01-12 DIAGNOSIS — S72.002A HIP FRACTURE, LEFT, CLOSED, INITIAL ENCOUNTER (H): ICD-10-CM
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
## 2021-01-12 DIAGNOSIS — J69.0 ASPIRATION PNEUMONITIS (H): ICD-10-CM

## 2021-01-12 PROBLEM — K59.01 SLOW TRANSIT CONSTIPATION: Status: ACTIVE | Noted: 2020-09-21

## 2021-01-12 PROBLEM — E78.5 HYPERLIPIDEMIA, UNSPECIFIED: Status: ACTIVE | Noted: 2020-09-21

## 2021-01-12 LAB
ABO + RH BLD: NORMAL
ABO + RH BLD: NORMAL
ANION GAP SERPL CALCULATED.3IONS-SCNC: 2 MMOL/L (ref 3–14)
BASOPHILS # BLD AUTO: 0.1 10E9/L (ref 0–0.2)
BASOPHILS NFR BLD AUTO: 0.5 %
BLD GP AB SCN SERPL QL: NORMAL
BLOOD BANK CMNT PATIENT-IMP: NORMAL
BUN SERPL-MCNC: 28 MG/DL (ref 7–30)
CALCIUM SERPL-MCNC: 9.1 MG/DL (ref 8.5–10.1)
CHLORIDE SERPL-SCNC: 107 MMOL/L (ref 94–109)
CO2 SERPL-SCNC: 30 MMOL/L (ref 20–32)
CREAT SERPL-MCNC: 1.1 MG/DL (ref 0.66–1.25)
DIFFERENTIAL METHOD BLD: NORMAL
EOSINOPHIL # BLD AUTO: 0.3 10E9/L (ref 0–0.7)
EOSINOPHIL NFR BLD AUTO: 2.9 %
ERYTHROCYTE [DISTWIDTH] IN BLOOD BY AUTOMATED COUNT: 12.4 % (ref 10–15)
GFR SERPL CREATININE-BSD FRML MDRD: 60 ML/MIN/{1.73_M2}
GLUCOSE SERPL-MCNC: 102 MG/DL (ref 70–99)
HCT VFR BLD AUTO: 44.3 % (ref 40–53)
HGB BLD-MCNC: 14.7 G/DL (ref 13.3–17.7)
IMM GRANULOCYTES # BLD: 0.1 10E9/L (ref 0–0.4)
IMM GRANULOCYTES NFR BLD: 0.7 %
INR PPP: 1.26 (ref 0.86–1.14)
LABORATORY COMMENT REPORT: NORMAL
LYMPHOCYTES # BLD AUTO: 3.1 10E9/L (ref 0.8–5.3)
LYMPHOCYTES NFR BLD AUTO: 33.3 %
MCH RBC QN AUTO: 32.5 PG (ref 26.5–33)
MCHC RBC AUTO-ENTMCNC: 33.2 G/DL (ref 31.5–36.5)
MCV RBC AUTO: 98 FL (ref 78–100)
MONOCYTES # BLD AUTO: 0.8 10E9/L (ref 0–1.3)
MONOCYTES NFR BLD AUTO: 8.3 %
NEUTROPHILS # BLD AUTO: 5.1 10E9/L (ref 1.6–8.3)
NEUTROPHILS NFR BLD AUTO: 54.3 %
NRBC # BLD AUTO: 0 10*3/UL
NRBC BLD AUTO-RTO: 0 /100
PLATELET # BLD AUTO: 211 10E9/L (ref 150–450)
POTASSIUM SERPL-SCNC: 4.3 MMOL/L (ref 3.4–5.3)
RBC # BLD AUTO: 4.53 10E12/L (ref 4.4–5.9)
SARS-COV-2 RNA RESP QL NAA+PROBE: NEGATIVE
SODIUM SERPL-SCNC: 139 MMOL/L (ref 133–144)
SPECIMEN EXP DATE BLD: NORMAL
SPECIMEN SOURCE: NORMAL
WBC # BLD AUTO: 9.4 10E9/L (ref 4–11)

## 2021-01-12 PROCEDURE — 86850 RBC ANTIBODY SCREEN: CPT | Performed by: EMERGENCY MEDICINE

## 2021-01-12 PROCEDURE — 250N000009 HC RX 250: Performed by: NURSE ANESTHETIST, CERTIFIED REGISTERED

## 2021-01-12 PROCEDURE — 250N000011 HC RX IP 250 OP 636: Performed by: PHYSICIAN ASSISTANT

## 2021-01-12 PROCEDURE — 85610 PROTHROMBIN TIME: CPT | Performed by: EMERGENCY MEDICINE

## 2021-01-12 PROCEDURE — 278N000051 HC OR IMPLANT GENERAL: Performed by: ORTHOPAEDIC SURGERY

## 2021-01-12 PROCEDURE — 999N000141 HC STATISTIC PRE-PROCEDURE NURSING ASSESSMENT: Performed by: ORTHOPAEDIC SURGERY

## 2021-01-12 PROCEDURE — 86900 BLOOD TYPING SEROLOGIC ABO: CPT | Performed by: EMERGENCY MEDICINE

## 2021-01-12 PROCEDURE — 370N000017 HC ANESTHESIA TECHNICAL FEE, PER MIN: Performed by: ORTHOPAEDIC SURGERY

## 2021-01-12 PROCEDURE — C1776 JOINT DEVICE (IMPLANTABLE): HCPCS | Performed by: ORTHOPAEDIC SURGERY

## 2021-01-12 PROCEDURE — 86901 BLOOD TYPING SEROLOGIC RH(D): CPT | Performed by: EMERGENCY MEDICINE

## 2021-01-12 PROCEDURE — 999N000063 XR PELVIS AND HIP PORTABLE LEFT 1 VIEW

## 2021-01-12 PROCEDURE — 0SRS019 REPLACEMENT OF LEFT HIP JOINT, FEMORAL SURFACE WITH METAL SYNTHETIC SUBSTITUTE, CEMENTED, OPEN APPROACH: ICD-10-PCS | Performed by: ORTHOPAEDIC SURGERY

## 2021-01-12 PROCEDURE — C9803 HOPD COVID-19 SPEC COLLECT: HCPCS

## 2021-01-12 PROCEDURE — 93005 ELECTROCARDIOGRAM TRACING: CPT

## 2021-01-12 PROCEDURE — 258N000003 HC RX IP 258 OP 636: Performed by: ANESTHESIOLOGY

## 2021-01-12 PROCEDURE — 258N000003 HC RX IP 258 OP 636: Performed by: INTERNAL MEDICINE

## 2021-01-12 PROCEDURE — 250N000013 HC RX MED GY IP 250 OP 250 PS 637: Performed by: INTERNAL MEDICINE

## 2021-01-12 PROCEDURE — 87635 SARS-COV-2 COVID-19 AMP PRB: CPT | Performed by: EMERGENCY MEDICINE

## 2021-01-12 PROCEDURE — 99223 1ST HOSP IP/OBS HIGH 75: CPT | Mod: AI | Performed by: INTERNAL MEDICINE

## 2021-01-12 PROCEDURE — 71045 X-RAY EXAM CHEST 1 VIEW: CPT

## 2021-01-12 PROCEDURE — 73502 X-RAY EXAM HIP UNI 2-3 VIEWS: CPT

## 2021-01-12 PROCEDURE — 96374 THER/PROPH/DIAG INJ IV PUSH: CPT

## 2021-01-12 PROCEDURE — 93010 ELECTROCARDIOGRAM REPORT: CPT | Performed by: INTERNAL MEDICINE

## 2021-01-12 PROCEDURE — 250N000011 HC RX IP 250 OP 636: Performed by: NURSE ANESTHETIST, CERTIFIED REGISTERED

## 2021-01-12 PROCEDURE — 99285 EMERGENCY DEPT VISIT HI MDM: CPT | Mod: 25

## 2021-01-12 PROCEDURE — 85025 COMPLETE CBC W/AUTO DIFF WBC: CPT | Performed by: EMERGENCY MEDICINE

## 2021-01-12 PROCEDURE — 258N000003 HC RX IP 258 OP 636: Performed by: PHYSICIAN ASSISTANT

## 2021-01-12 PROCEDURE — 72125 CT NECK SPINE W/O DYE: CPT

## 2021-01-12 PROCEDURE — 250N000025 HC SEVOFLURANE, PER MIN: Performed by: ORTHOPAEDIC SURGERY

## 2021-01-12 PROCEDURE — 360N000077 HC SURGERY LEVEL 4, PER MIN: Performed by: ORTHOPAEDIC SURGERY

## 2021-01-12 PROCEDURE — 80048 BASIC METABOLIC PNL TOTAL CA: CPT | Performed by: EMERGENCY MEDICINE

## 2021-01-12 PROCEDURE — 120N000001 HC R&B MED SURG/OB

## 2021-01-12 PROCEDURE — 710N000009 HC RECOVERY PHASE 1, LEVEL 1, PER MIN: Performed by: ORTHOPAEDIC SURGERY

## 2021-01-12 PROCEDURE — 272N000001 HC OR GENERAL SUPPLY STERILE: Performed by: ORTHOPAEDIC SURGERY

## 2021-01-12 PROCEDURE — 250N000009 HC RX 250: Performed by: PHYSICIAN ASSISTANT

## 2021-01-12 PROCEDURE — 250N000011 HC RX IP 250 OP 636: Performed by: ORTHOPAEDIC SURGERY

## 2021-01-12 PROCEDURE — 70450 CT HEAD/BRAIN W/O DYE: CPT

## 2021-01-12 PROCEDURE — 250N000013 HC RX MED GY IP 250 OP 250 PS 637: Performed by: NURSE ANESTHETIST, CERTIFIED REGISTERED

## 2021-01-12 PROCEDURE — 250N000011 HC RX IP 250 OP 636: Performed by: EMERGENCY MEDICINE

## 2021-01-12 PROCEDURE — 258N000003 HC RX IP 258 OP 636: Performed by: NURSE ANESTHETIST, CERTIFIED REGISTERED

## 2021-01-12 DEVICE — IMPLANTABLE DEVICE
Type: IMPLANTABLE DEVICE | Site: HIP | Status: FUNCTIONAL
Brand: VERSYS®

## 2021-01-12 DEVICE — IMPLANTABLE DEVICE
Type: IMPLANTABLE DEVICE | Site: HIP | Status: FUNCTIONAL
Brand: BIO-MOORE ENDO II HIP SYSTEM

## 2021-01-12 DEVICE — SPEEDSET FULL DOSE ANTIBIOTIC BONE CEMENT, 10 PACK CATALOG NUMBER IS 6192-1-010
Type: IMPLANTABLE DEVICE | Site: HIP | Status: FUNCTIONAL
Brand: SIMPLEX

## 2021-01-12 DEVICE — IMP STEM FEMORAL BIOM ECHO FX 11MM STD 12-151311: Type: IMPLANTABLE DEVICE | Site: HIP | Status: FUNCTIONAL

## 2021-01-12 RX ORDER — NALOXONE HYDROCHLORIDE 0.4 MG/ML
0.4 INJECTION, SOLUTION INTRAMUSCULAR; INTRAVENOUS; SUBCUTANEOUS
Status: DISCONTINUED | OUTPATIENT
Start: 2021-01-12 | End: 2021-01-13

## 2021-01-12 RX ORDER — AMOXICILLIN 250 MG
2 CAPSULE ORAL 2 TIMES DAILY PRN
Status: DISCONTINUED | OUTPATIENT
Start: 2021-01-12 | End: 2021-01-16 | Stop reason: HOSPADM

## 2021-01-12 RX ORDER — NEOSTIGMINE METHYLSULFATE 1 MG/ML
VIAL (ML) INJECTION PRN
Status: DISCONTINUED | OUTPATIENT
Start: 2021-01-12 | End: 2021-01-12

## 2021-01-12 RX ORDER — POLYETHYLENE GLYCOL 3350 17 G/17G
17 POWDER, FOR SOLUTION ORAL DAILY PRN
Status: DISCONTINUED | OUTPATIENT
Start: 2021-01-12 | End: 2021-01-16 | Stop reason: HOSPADM

## 2021-01-12 RX ORDER — LIDOCAINE 40 MG/G
CREAM TOPICAL
Status: DISCONTINUED | OUTPATIENT
Start: 2021-01-12 | End: 2021-01-13

## 2021-01-12 RX ORDER — TRANEXAMIC ACID 10 MG/ML
1 INJECTION, SOLUTION INTRAVENOUS ONCE
Status: COMPLETED | OUTPATIENT
Start: 2021-01-12 | End: 2021-01-12

## 2021-01-12 RX ORDER — NALOXONE HYDROCHLORIDE 0.4 MG/ML
0.2 INJECTION, SOLUTION INTRAMUSCULAR; INTRAVENOUS; SUBCUTANEOUS
Status: DISCONTINUED | OUTPATIENT
Start: 2021-01-12 | End: 2021-01-13

## 2021-01-12 RX ORDER — CHOLECALCIFEROL (VITAMIN D3) 50 MCG
50 TABLET ORAL DAILY
Status: DISCONTINUED | OUTPATIENT
Start: 2021-01-13 | End: 2021-01-16 | Stop reason: HOSPADM

## 2021-01-12 RX ORDER — BISACODYL 10 MG
10 SUPPOSITORY, RECTAL RECTAL DAILY PRN
Status: DISCONTINUED | OUTPATIENT
Start: 2021-01-12 | End: 2021-01-13

## 2021-01-12 RX ORDER — QUETIAPINE FUMARATE 25 MG/1
12.5 TABLET, FILM COATED ORAL 2 TIMES DAILY PRN
COMMUNITY

## 2021-01-12 RX ORDER — METOPROLOL TARTRATE 1 MG/ML
5 INJECTION, SOLUTION INTRAVENOUS EVERY 4 HOURS PRN
Status: DISCONTINUED | OUTPATIENT
Start: 2021-01-12 | End: 2021-01-16 | Stop reason: HOSPADM

## 2021-01-12 RX ORDER — GLYCOPYRROLATE 0.2 MG/ML
INJECTION, SOLUTION INTRAMUSCULAR; INTRAVENOUS PRN
Status: DISCONTINUED | OUTPATIENT
Start: 2021-01-12 | End: 2021-01-12

## 2021-01-12 RX ORDER — SODIUM CHLORIDE, SODIUM LACTATE, POTASSIUM CHLORIDE, CALCIUM CHLORIDE 600; 310; 30; 20 MG/100ML; MG/100ML; MG/100ML; MG/100ML
INJECTION, SOLUTION INTRAVENOUS CONTINUOUS
Status: DISCONTINUED | OUTPATIENT
Start: 2021-01-12 | End: 2021-01-12 | Stop reason: HOSPADM

## 2021-01-12 RX ORDER — ATORVASTATIN CALCIUM 20 MG/1
20 TABLET, FILM COATED ORAL EVERY EVENING
Status: DISCONTINUED | OUTPATIENT
Start: 2021-01-12 | End: 2021-01-16 | Stop reason: HOSPADM

## 2021-01-12 RX ORDER — CEFAZOLIN SODIUM 2 G/100ML
2 INJECTION, SOLUTION INTRAVENOUS
Status: COMPLETED | OUTPATIENT
Start: 2021-01-12 | End: 2021-01-12

## 2021-01-12 RX ORDER — LIDOCAINE HYDROCHLORIDE 20 MG/ML
INJECTION, SOLUTION INFILTRATION; PERINEURAL PRN
Status: DISCONTINUED | OUTPATIENT
Start: 2021-01-12 | End: 2021-01-12

## 2021-01-12 RX ORDER — BENZTROPINE MESYLATE 1 MG/1
1-2 TABLET ORAL 3 TIMES DAILY PRN
Status: DISCONTINUED | OUTPATIENT
Start: 2021-01-12 | End: 2021-01-16 | Stop reason: HOSPADM

## 2021-01-12 RX ORDER — CEFAZOLIN SODIUM 1 G/3ML
1 INJECTION, POWDER, FOR SOLUTION INTRAMUSCULAR; INTRAVENOUS SEE ADMIN INSTRUCTIONS
Status: DISCONTINUED | OUTPATIENT
Start: 2021-01-12 | End: 2021-01-12 | Stop reason: HOSPADM

## 2021-01-12 RX ORDER — OXYCODONE HYDROCHLORIDE 5 MG/1
5 TABLET ORAL
Status: DISCONTINUED | OUTPATIENT
Start: 2021-01-12 | End: 2021-01-13

## 2021-01-12 RX ORDER — MORPHINE SULFATE 4 MG/ML
4 INJECTION, SOLUTION INTRAMUSCULAR; INTRAVENOUS ONCE
Status: COMPLETED | OUTPATIENT
Start: 2021-01-12 | End: 2021-01-12

## 2021-01-12 RX ORDER — HALOPERIDOL 5 MG/ML
1-2 INJECTION INTRAMUSCULAR EVERY 6 HOURS PRN
Status: DISCONTINUED | OUTPATIENT
Start: 2021-01-12 | End: 2021-01-16 | Stop reason: HOSPADM

## 2021-01-12 RX ORDER — FENTANYL CITRATE 50 UG/ML
25-50 INJECTION, SOLUTION INTRAMUSCULAR; INTRAVENOUS
Status: DISCONTINUED | OUTPATIENT
Start: 2021-01-12 | End: 2021-01-12 | Stop reason: HOSPADM

## 2021-01-12 RX ORDER — QUETIAPINE FUMARATE 25 MG/1
25 TABLET, FILM COATED ORAL 3 TIMES DAILY PRN
COMMUNITY
End: 2021-02-08

## 2021-01-12 RX ORDER — ONDANSETRON 4 MG/1
4 TABLET, ORALLY DISINTEGRATING ORAL EVERY 6 HOURS PRN
Status: DISCONTINUED | OUTPATIENT
Start: 2021-01-12 | End: 2021-01-16 | Stop reason: HOSPADM

## 2021-01-12 RX ORDER — ONDANSETRON 4 MG/1
4 TABLET, ORALLY DISINTEGRATING ORAL EVERY 30 MIN PRN
Status: DISCONTINUED | OUTPATIENT
Start: 2021-01-12 | End: 2021-01-12 | Stop reason: HOSPADM

## 2021-01-12 RX ORDER — PROPOFOL 10 MG/ML
INJECTION, EMULSION INTRAVENOUS PRN
Status: DISCONTINUED | OUTPATIENT
Start: 2021-01-12 | End: 2021-01-12

## 2021-01-12 RX ORDER — MEPERIDINE HYDROCHLORIDE 25 MG/ML
12.5 INJECTION INTRAMUSCULAR; INTRAVENOUS; SUBCUTANEOUS EVERY 5 MIN PRN
Status: DISCONTINUED | OUTPATIENT
Start: 2021-01-12 | End: 2021-01-12 | Stop reason: HOSPADM

## 2021-01-12 RX ORDER — ONDANSETRON 2 MG/ML
4 INJECTION INTRAMUSCULAR; INTRAVENOUS EVERY 6 HOURS PRN
Status: DISCONTINUED | OUTPATIENT
Start: 2021-01-12 | End: 2021-01-16 | Stop reason: HOSPADM

## 2021-01-12 RX ORDER — ALBUTEROL SULFATE 90 UG/1
AEROSOL, METERED RESPIRATORY (INHALATION) PRN
Status: DISCONTINUED | OUTPATIENT
Start: 2021-01-12 | End: 2021-01-12

## 2021-01-12 RX ORDER — VANCOMYCIN HYDROCHLORIDE 1 G/20ML
INJECTION, POWDER, LYOPHILIZED, FOR SOLUTION INTRAVENOUS PRN
Status: DISCONTINUED | OUTPATIENT
Start: 2021-01-12 | End: 2021-01-12 | Stop reason: HOSPADM

## 2021-01-12 RX ORDER — VENLAFAXINE HYDROCHLORIDE 75 MG/1
225 CAPSULE, EXTENDED RELEASE ORAL EVERY EVENING
Status: DISCONTINUED | OUTPATIENT
Start: 2021-01-12 | End: 2021-01-16 | Stop reason: HOSPADM

## 2021-01-12 RX ORDER — ACETAMINOPHEN 650 MG/1
650 SUPPOSITORY RECTAL EVERY 4 HOURS PRN
Status: DISCONTINUED | OUTPATIENT
Start: 2021-01-12 | End: 2021-01-13

## 2021-01-12 RX ORDER — FENTANYL CITRATE 50 UG/ML
INJECTION, SOLUTION INTRAMUSCULAR; INTRAVENOUS PRN
Status: DISCONTINUED | OUTPATIENT
Start: 2021-01-12 | End: 2021-01-12

## 2021-01-12 RX ORDER — ACETAMINOPHEN 325 MG/1
650 TABLET ORAL EVERY 4 HOURS PRN
Status: DISCONTINUED | OUTPATIENT
Start: 2021-01-12 | End: 2021-01-13

## 2021-01-12 RX ORDER — AMOXICILLIN 250 MG
1 CAPSULE ORAL 2 TIMES DAILY PRN
Status: DISCONTINUED | OUTPATIENT
Start: 2021-01-12 | End: 2021-01-16 | Stop reason: HOSPADM

## 2021-01-12 RX ORDER — DEXAMETHASONE SODIUM PHOSPHATE 4 MG/ML
INJECTION, SOLUTION INTRA-ARTICULAR; INTRALESIONAL; INTRAMUSCULAR; INTRAVENOUS; SOFT TISSUE PRN
Status: DISCONTINUED | OUTPATIENT
Start: 2021-01-12 | End: 2021-01-12

## 2021-01-12 RX ORDER — ONDANSETRON 2 MG/ML
4 INJECTION INTRAMUSCULAR; INTRAVENOUS EVERY 30 MIN PRN
Status: DISCONTINUED | OUTPATIENT
Start: 2021-01-12 | End: 2021-01-12 | Stop reason: HOSPADM

## 2021-01-12 RX ORDER — POLYETHYLENE GLYCOL 3350 17 G/17G
17 POWDER, FOR SOLUTION ORAL DAILY PRN
Status: DISCONTINUED | OUTPATIENT
Start: 2021-01-12 | End: 2021-01-12

## 2021-01-12 RX ORDER — CARBOXYMETHYLCELLULOSE SODIUM 5 MG/ML
2 SOLUTION/ DROPS OPHTHALMIC EVERY 6 HOURS PRN
Status: DISCONTINUED | OUTPATIENT
Start: 2021-01-12 | End: 2021-01-16 | Stop reason: HOSPADM

## 2021-01-12 RX ORDER — LANOLIN ALCOHOL/MO/W.PET/CERES
3 CREAM (GRAM) TOPICAL AT BEDTIME
Status: DISCONTINUED | OUTPATIENT
Start: 2021-01-12 | End: 2021-01-16 | Stop reason: HOSPADM

## 2021-01-12 RX ORDER — SODIUM CHLORIDE 9 MG/ML
INJECTION, SOLUTION INTRAVENOUS CONTINUOUS
Status: DISCONTINUED | OUTPATIENT
Start: 2021-01-12 | End: 2021-01-13

## 2021-01-12 RX ADMIN — PROPOFOL 50 MG: 10 INJECTION, EMULSION INTRAVENOUS at 13:01

## 2021-01-12 RX ADMIN — SODIUM CHLORIDE: 9 INJECTION, SOLUTION INTRAVENOUS at 05:52

## 2021-01-12 RX ADMIN — VENLAFAXINE HYDROCHLORIDE 225 MG: 75 CAPSULE, EXTENDED RELEASE ORAL at 19:30

## 2021-01-12 RX ADMIN — PHENYLEPHRINE HYDROCHLORIDE 100 MCG: 10 INJECTION INTRAVENOUS at 13:43

## 2021-01-12 RX ADMIN — SODIUM CHLORIDE, POTASSIUM CHLORIDE, SODIUM LACTATE AND CALCIUM CHLORIDE: 600; 310; 30; 20 INJECTION, SOLUTION INTRAVENOUS at 12:04

## 2021-01-12 RX ADMIN — PHENYLEPHRINE HYDROCHLORIDE 0.1 MCG/KG/MIN: 10 INJECTION INTRAVENOUS at 13:57

## 2021-01-12 RX ADMIN — ROCURONIUM BROMIDE 40 MG: 10 INJECTION INTRAVENOUS at 13:01

## 2021-01-12 RX ADMIN — OXYCODONE HYDROCHLORIDE 5 MG: 5 TABLET ORAL at 05:51

## 2021-01-12 RX ADMIN — NEOSTIGMINE METHYLSULFATE 4 MG: 1 INJECTION, SOLUTION INTRAVENOUS at 14:30

## 2021-01-12 RX ADMIN — ROCURONIUM BROMIDE 10 MG: 10 INJECTION INTRAVENOUS at 13:21

## 2021-01-12 RX ADMIN — CEFAZOLIN SODIUM 2 G: 2 INJECTION, SOLUTION INTRAVENOUS at 13:14

## 2021-01-12 RX ADMIN — ROCURONIUM BROMIDE 20 MG: 10 INJECTION INTRAVENOUS at 13:30

## 2021-01-12 RX ADMIN — LIDOCAINE HYDROCHLORIDE 80 MG: 20 INJECTION, SOLUTION INFILTRATION; PERINEURAL at 13:01

## 2021-01-12 RX ADMIN — PHENYLEPHRINE HYDROCHLORIDE 50 MCG: 10 INJECTION INTRAVENOUS at 13:58

## 2021-01-12 RX ADMIN — ALBUTEROL SULFATE 2 PUFF: 90 AEROSOL, METERED RESPIRATORY (INHALATION) at 14:30

## 2021-01-12 RX ADMIN — FENTANYL CITRATE 25 MCG: 50 INJECTION, SOLUTION INTRAMUSCULAR; INTRAVENOUS at 13:52

## 2021-01-12 RX ADMIN — GLYCOPYRROLATE 0.8 MG: 0.2 INJECTION, SOLUTION INTRAMUSCULAR; INTRAVENOUS at 14:30

## 2021-01-12 RX ADMIN — TRANEXAMIC ACID 1 G: 10 INJECTION, SOLUTION INTRAVENOUS at 14:20

## 2021-01-12 RX ADMIN — TRANEXAMIC ACID 1 G: 10 INJECTION, SOLUTION INTRAVENOUS at 13:27

## 2021-01-12 RX ADMIN — ALBUTEROL SULFATE 2 PUFF: 90 AEROSOL, METERED RESPIRATORY (INHALATION) at 14:31

## 2021-01-12 RX ADMIN — ATORVASTATIN CALCIUM 20 MG: 20 TABLET, FILM COATED ORAL at 19:30

## 2021-01-12 RX ADMIN — SODIUM CHLORIDE, POTASSIUM CHLORIDE, SODIUM LACTATE AND CALCIUM CHLORIDE: 600; 310; 30; 20 INJECTION, SOLUTION INTRAVENOUS at 14:28

## 2021-01-12 RX ADMIN — DEXAMETHASONE SODIUM PHOSPHATE 4 MG: 4 INJECTION, SOLUTION INTRA-ARTICULAR; INTRALESIONAL; INTRAMUSCULAR; INTRAVENOUS; SOFT TISSUE at 13:26

## 2021-01-12 RX ADMIN — FENTANYL CITRATE 25 MCG: 50 INJECTION, SOLUTION INTRAMUSCULAR; INTRAVENOUS at 13:19

## 2021-01-12 RX ADMIN — MORPHINE SULFATE 4 MG: 4 INJECTION, SOLUTION INTRAMUSCULAR; INTRAVENOUS at 02:46

## 2021-01-12 RX ADMIN — FENTANYL CITRATE 25 MCG: 50 INJECTION, SOLUTION INTRAMUSCULAR; INTRAVENOUS at 14:31

## 2021-01-12 RX ADMIN — FENTANYL CITRATE 25 MCG: 50 INJECTION, SOLUTION INTRAMUSCULAR; INTRAVENOUS at 13:01

## 2021-01-12 ASSESSMENT — ENCOUNTER SYMPTOMS
DYSRHYTHMIAS: 1
ARTHRALGIAS: 0
ABDOMINAL PAIN: 0
FEVER: 0
MYALGIAS: 1
SHORTNESS OF BREATH: 0

## 2021-01-12 ASSESSMENT — MIFFLIN-ST. JEOR
SCORE: 1625.19
SCORE: 1578.02

## 2021-01-12 ASSESSMENT — ACTIVITIES OF DAILY LIVING (ADL)
ADLS_ACUITY_SCORE: 30
ADLS_ACUITY_SCORE: 28

## 2021-01-12 ASSESSMENT — COPD QUESTIONNAIRES
COPD: 1
CAT_SEVERITY: MODERATE

## 2021-01-12 NOTE — ANESTHESIA PROCEDURE NOTES
Airway   Date/Time: 1/12/2021 1:04 PM   Patient location during procedure: OR    Staff -   Performed By: anesthesiologist and CRNA    Consent for Airway   Urgency: elective    Indications and Patient Condition  Indications for airway management: dereck-procedural  Induction type:intravenousMask difficulty assessment: 2 - vent by mask + OA or adjuvant +/- NMBA    Final Airway Details  Final airway type: endotracheal airway  Successful airway:ETT - single  Endotracheal Airway Details   ETT size (mm): 8.0  Cuffed: yes  Successful intubation technique: direct laryngoscopy and video laryngoscopy  Grade View of Cords: 1  Adjucts: stylet  Measured from: gums/teeth  Secured with: silk tape  Bite block used: None    Post intubation assessment   Placement verified by: capnometry, equal breath sounds and chest rise   Number of attempts at approach: 1  Number of other approaches attempted: 0  Secured with:silk tape  Ease of procedure: easy  Dentition: Intact and Unchanged

## 2021-01-12 NOTE — PROGRESS NOTES
Pt's sister Arlene Black called ([phone]) for phone consent for surgery.  Dr. Gaines, Brandt Hirsch, PRASHANTH and Binta Huang RN present to witness/sign surgical and blood consent.  Arlene signed up for text messaging as well.

## 2021-01-12 NOTE — OP NOTE
[facility]  Orthopedic Operative Note    Posterior Approach Hip Hemiarthroplasty      Maricarmen Lovell MRN# [id number]   YOB: 1934  Procedure Date:  1/12/2021  Age: 86 year old       PREOPERATIVE DIAGNOSIS:  Femoral neck fracture left hip.    POSTOPERATIVE DIAGNOSIS:  Femoral neck fracture left hip.    PROCEDURE PERFORMED:  Hemiarthroplasty left hip     SURGEON:  Moody Gaines MD    FIRST ASSISTANT:  Yara Teran PA-C. Her assistance was critical during transfer, positioning, preparation, draping, surgical approach, preparation of femoral canal, implant placement, closure and placement of dressings. Her assistance allowed me to operate efficiently, decreasing surgical time and risk.     ANESTHESIA:  General    INDICATIONS:  Maricarmen Lovell  is a 86 year old-year-old male with dementia and multiple medical issues who presents with femoral neck fracture of left hip.  Discussed both various operative and nonoperative management with sister.  Risks of surgery discussed included but not limited to bleeding, infection, damage to surrounding neurovascular structures, leg length inequality, dislocation, periprosthetic fracture, need for revision surgery, blood clots, pulmonary embolus, stroke, anesthetic complications and even death.  No guarantees given or implied. Patient sister verbalizes and acknowledges risks and wishes to proceed. This procedure has been fully reviewed with the patient and written informed consent has been obtained.     IMPLANTS:  Implant Name Type Inv. Item Serial No.  Lot No. LRB No. Used Action   BONE CEMENT RADIOPAQUE SIMPLEX P SPEEDSET 6192-1-001 Cement, Bone BONE CEMENT RADIOPAQUE SIMPLEX P SPEEDSET 6192-1-001  [employer] [id number] Left 2 Implanted   IMP STEM FEMORAL BIOM ECHO FX 11MM STD [id number] Total Joint Component/Insert IMP STEM FEMORAL BIOM ECHO FX 11MM STD [id number]  [employer] [id number] Left 1 Implanted   IMP CENTRALIZER  DISTAL ZIM VERSYS 10 Total Joint Component/Insert IMP CENTRALIZER DISTAL ZIM VERSYS 10  [employer] [id number] Left 1 Implanted   IMP INSERT FEMORAL BIOM ENDO TAPER STD [id number] Total Joint Component/Insert IMP INSERT FEMORAL BIOM ENDO TAPER STD [id number]  [employer] [id number] Left 1 Implanted   IMP HEAD FEMORAL BIOM ENDO [id number] [place] [id number] Total Joint Component/Insert IMP HEAD FEMORAL BIOM ENDO [id number] [place] [id number]  [employer] [id number] Left 1 Implanted       PROCEDURE:  Patient identified in preoperative holding area and the operative site marked with indelible marker. Brought to operating room and transferred to operating room table. Underwent induction of general anesthesia. Subsequently turned to the right lateral decubitus position with axillary roll and all bony prominences well padded.  Chlorohexidine prescrub performed and the left hip was prepped with Chloroprep and draped in the usual sterile fashion.  A posterolateral incision was made.  Dissection was carried through the IT band and tensor fascia.  The external rotators and capsule were identified, tagged, divided, and preserved for later repair. Proximal portion of quadratus femoris release. Medial femoral circumflex artery cauterized. Posterolateral release carried down to the level of the lesser trochanter. The leg was placed in combined adduction, internal rotation and flexion. A neck cut was made maintaining 1 cm of femoral neck proximal to lesser trochanter. The femoral head and neck were removed.  The femoral canal was then opened with box osteotome, canal finder, and then lateralizer. Femoral canal sequentially broached to accept a stem trial.  A trial head was placed and the hip joint was reduced. After trial reduction, leg lengths were equal by palpation and and the hip was stable with extension and adduction as well as flexion, adduction and internal rotation. The hip was dislocated and the the final stem was inserted after corky  irrigation and head was implanted on cleaned and dried crane taper.  The hip was reduced and again found to be stable. The wound was was washed with Rush betadine protocol (17cc 10% Betadine in 500cc Sterile Saline) and then irrigated with antibiotic irrigating solution.  The piriformis and short external rotators and capsule were reattached to the trochanter through drill holes.  The wound was then irrigated thoroughly and closed in layers over drained with #1 Stratfix, 2-0 Vicryl, 3-0 Monocryl and dermabond.  A sterile dressing was applied with Aquacel.  There were no intraoperative complications and patient tolerated procedure well. Sponge and needle counts were correct and the patient was returned to the recovery room in stable condition.    Post Op Plan:  1.  WBAT with Posterior hip precautions x 6 weeks  2.  Hip abduction pillow when sleeping x 6 weeks  3.  Antibiotics x 24 hours  4.  DVT prophylaxis with SCDs and ASA EC 325mg PO once/day for 4 weeks  5.  Keep Aquacel dressing intact x 2 weeks. OK to shower with dressing in-place.  6.  PACU x-rays AP Pelvis and left hip lateral    Follow Up:  1.  2 weeks for wound check with AP pelvis and cross table lateral hip  2.  8 weeks with AP pelvis and cross table lateral left hip    Moody Gaines MD  Orthopedic Trauma and Arthroplasty  [facility]  [phone]

## 2021-01-12 NOTE — PLAN OF CARE
Alert to self. Non-verbal.RUTH ANN CMS. Weak hand  and foot flexion. Bedfast. VSS. Hx of A fib. Lungs dim. Reg diet. Total care. Plan is to encourage PO intake. Ice and tylenol PRN. Hold xarelto.

## 2021-01-12 NOTE — ED NOTES
Bed: ED21  Expected date:   Expected time:   Means of arrival:   Comments:  E2  86M Left hip pain  0299

## 2021-01-12 NOTE — PHARMACY-ADMISSION MEDICATION HISTORY
Pharmacy Medication History  Admission medication history interview status for the 1/12/2021  admission is complete. See EPIC admission navigator for prior to admission medications       Medication history sources: [facility] ([facility])  Location of interview:n/a  Adherence Assessment: Good    Significant changes made to the medication list:  Added Xarelto and Seroquel       Additional medication history information:       Medication reconciliation completed by provider prior to medication history? No    Time spent in this activity: 15 min      Prior to Admission medications    Medication Sig Last Dose Taking? Auth Provider   acetaminophen (TYLENOL) 325 MG tablet Take 650 mg by mouth every 4 hours as needed for mild pain Past Week at Unknown time Yes Unknown, Entered By History   atorvastatin (LIPITOR) 20 MG tablet Take 1 tablet (20 mg) by mouth every evening 1/11/2021 at Unknown time Yes Karmen Pederson MD   hypromellose (ARTIFICIAL TEARS) 0.5 % SOLN ophthalmic solution Place 2 drops into both eyes every 6 hours as needed for dry eyes  Past Week at Unknown time Yes Unknown, Entered By History   melatonin 3 MG tablet Take 3 mg by mouth At Bedtime  1/11/2021 at Unknown time Yes Unknown, Entered By History   polyethylene glycol (MIRALAX) 17 g packet Take 1 packet by mouth daily as needed for constipation Past Week at Unknown time Yes Unknown, Entered By History   psyllium (METAMUCIL/KONSYL) Packet Take 1 packet by mouth daily as needed for constipation Past Week at Unknown time Yes Unknown, Entered By History   QUEtiapine (SEROQUEL) 25 MG tablet Take 12.5 mg by mouth every evening 1/11/2021 at Unknown time Yes Unknown, Entered By History   QUEtiapine (SEROQUEL) 25 MG tablet Take 12.5 mg by mouth 2 times daily as needed Past Week at Unknown time Yes Unknown, Entered By History   rivaroxaban ANTICOAGULANT (XARELTO) 15 MG TABS tablet Take 15 mg by mouth every morning 1/11/2021 at Unknown time Yes Unknown, Entered  By History   STIMULANT LAXATIVE 8.6-50 MG tablet TAKE ONE TABLET BY MOUTH TWICE A DAY FOR CONSTIPATION  Patient taking differently: Take 1 tablet by mouth 2 times daily  1/11/2021 at Unknown time Yes Yani Servin APRN CNP   venlafaxine (EFFEXOR-ER) 225 MG 24 hr tablet Take 225 mg by mouth every evening  1/11/2021 at pm Yes Unknown, Entered By History   vitamin D3 (CHOLECALCIFEROL) 50 mcg (2000 units) tablet Take 50 mcg by mouth every morning  1/11/2021 at Unknown time Yes Unknown, Entered By History       The information provided in this note is only as accurate as the sources available at the time of the update(s).

## 2021-01-12 NOTE — PROGRESS NOTES
RECEIVING UNIT ED HANDOFF REVIEW    ED Nurse Handoff Report was reviewed by: Kizzy Shah RN on January 12, 2021 at 3:37 AM

## 2021-01-12 NOTE — ANESTHESIA CARE TRANSFER NOTE
Patient: Maricarmen Lovell    Procedure(s):  LEFT HIP CEMENTED HEMIARTHROPLASTY    Diagnosis: Hip fracture (H) [S72.009A]  Diagnosis Additional Information: No value filed.    Anesthesia Type:   General     Note:  Airway :Face Mask  Patient transferred to:PACU  Comments: Neuromuscular blockade reversed after TOF 4/4, spontaneous respirations, adequate tidal volumes, followed commands to voice, oropharynx suctioned with soft flexible catheter, extubated atraumatically, extubated with suction, airway patent after extubation.  Oxygen via facemask at 6 liters per minute to PACU. After extubation, patient remained in OR for 14 minutes until transport to PACU due to standard hospital COVID-19 precautions, Oxygen tubing connected to wall O2 in PACU, SpO2, NiBP, and EKG monitors and alarms on and functioning, Manjit Hugger warmer connected to patient gown, report on patient's clinical status given to PACU RN, RN questions answered.   Handoff Report: Identifed the Patient, Identified the Reponsible Provider, Reviewed the pertinent medical history, Discussed the surgical course, Reviewed Intra-OP anesthesia mangement and issues during anesthesia, Set expectations for post-procedure period and Allowed opportunity for questions and acknowledgement of understanding      Vitals: (Last set prior to Anesthesia Care Transfer)    CRNA VITALS  1/12/2021 1422 - 1/12/2021 1457      1/12/2021             NIBP:  127/85    Pulse:  84    NIBP Mean:  93    Ht Rate:  87    SpO2:  98 %    Resp Rate (set):  10                Electronically Signed By: NARESH Durant CRNA  January 12, 2021  2:57 PM

## 2021-01-12 NOTE — ANESTHESIA PREPROCEDURE EVALUATION
Anesthesia Pre-Procedure Evaluation    Patient: Maricarmen Lovell   MRN: [id number] : 1934          Preoperative Diagnosis: Hip fracture (H) [S72.009A]    Procedure(s):  LEFT HIP CEMENTED HEMIARTHROPLASTY.    Past Medical History:   Diagnosis Date     Alzheimer disease (H)      Chronic a-fib (H)      CKD (chronic kidney disease) stage 3, GFR 30-59 ml/min      COPD (chronic obstructive pulmonary disease) (H)      Depression      Frequent falls      HTN (hypertension)      History reviewed. No pertinent surgical history.    Anesthesia Evaluation     .             ROS/MED HX    ENT/Pulmonary:     (+)moderate COPD, , . .   (-) sleep apnea   Neurologic: Comment: Alzheimer's disease    (+)dementia,     Cardiovascular:     (+) Dyslipidemia, hypertension----. : . . . :. dysrhythmias a-fib, . Previous cardiac testing date:results:date: results:ECG reviewed date: results:Atrial fibrillation at 80 bpm date: results:          METS/Exercise Tolerance:     Hematologic:         Musculoskeletal:   (+) fracture lower extremity: Femoral, -       GI/Hepatic:         Renal/Genitourinary:     (+) chronic renal disease, type: CRI,       Endo:         Psychiatric:         Infectious Disease:         Malignancy:         Other:                          Physical Exam  Normal systems: dental    Airway   Mallampati: III  TM distance: >3 FB  Neck ROM: full    Dental     Cardiovascular   Rhythm and rate: irregular      Pulmonary    breath sounds clear to auscultation            Lab Results   Component Value Date    WBC 9.4 2021    HGB 14.7 2021    HCT 44.3 2021     2021     2021    POTASSIUM 4.3 2021    CHLORIDE 107 2021    CO2 30 2021    BUN 28 2021    CR 1.10 2021     (H) 2021    BEATRIZ 9.1 2021    PHOS 2.7 2020    MAG 2.1 2020    INR 1.26 (H) 2021       Preop Vitals  BP Readings from Last 3 Encounters:   21 (!) 132/93    10/07/20 112/75   09/29/20 120/82    Pulse Readings from Last 3 Encounters:   01/12/21 72   10/07/20 65   09/29/20 68      Resp Readings from Last 3 Encounters:   01/12/21 18   10/07/20 18   09/29/20 18    SpO2 Readings from Last 3 Encounters:   01/12/21 96%   10/07/20 96%   09/29/20 96%      Temp Readings from Last 1 Encounters:   01/12/21 36.3  C (97.4  F) (Temporal)    Ht Readings from Last 1 Encounters:   01/12/21 1.829 m (6')      Wt Readings from Last 1 Encounters:   01/12/21 90.7 kg (200 lb)    Estimated body mass index is 27.12 kg/m  as calculated from the following:    Height as of this encounter: 1.829 m (6').    Weight as of this encounter: 90.7 kg (200 lb).       Anesthesia Plan      History & Physical Review  History and physical reviewed and following examination; no interval change.    ASA Status:  3 .    NPO Status:  > 8 hours    Plan for General (ETT) with Intravenous and Propofol induction. Maintenance will be Balanced.    PONV prophylaxis:  Ondansetron (or other 5HT-3) and Dexamethasone or Solumedrol  I discussed the risk of prolonged intubation, given medical comorbidities and the nature of the procedure, postoperatively with Cassi, patient's sister, and she expresses understanding and accepts the risk.             Postoperative Care  Postoperative pain management:  Multi-modal analgesia.      Consents  Anesthetic plan, risks, benefits and alternatives discussed with:  Sibling (Discussed case with Maricarmen Ye's sister, who gave consent for anesthetic)..                 Jalil Coulter MD

## 2021-01-12 NOTE — ED TRIAGE NOTES
"Pt from [facility]. Was getting up to bathroom and fell into wall, injuring left hip. Pt unsure of LOC or hitting head, states \"I ended up under the sink.\"   "

## 2021-01-12 NOTE — TREATMENT PLAN
[facility]    Orthopedics Preliminary Consult Note    Chart reviewed.    Maricarmen Lovell is a 86 year old male with left displaced femoral neck fracure.    Indicated for hemiarthroplasty.    Will evaluate patient on 1/13/21    Maintain NPO    OR booked for 1/13/21 @ 1230    Full consult note to follow.    Please call with questions.    Moody Gaines MD  Orthopedic Trauma and Arthroplasty  [facility]  [phone]

## 2021-01-12 NOTE — PLAN OF CARE
Vitals stable. Up with SBA of one. Moving well. Dressing clean and dry. Immobilizer ontact for one week. Pain under control with oxycodone.Stable-progressing per pathway.

## 2021-01-12 NOTE — ED NOTES
[facility]  ED Nurse Handoff Report    ED Chief complaint: Hip Pain (Left) and Fall      ED Diagnosis:   Final diagnoses:   None       Code Status: DNR / DNI    Allergies:   Allergies   Allergen Reactions     Pecan [Nuts] Anaphylaxis     Fluoxetine Nausea     Juglans        Patient Story: Pt from [facility] where pt is normally independent with a walker. Prior to arrival, pt fell in bathroom, injuring left hip, called staff and was assisted up within 5 minutes of falling. Pt unsure of hitting head or LOC.     Focused Assessment:  Pt reports pain in left hip, see xray results. Pt has history of dementia. Is forgetful and can be slow to respond, but answers questions appropriately. Started O2 due to sats dropping to 87% RA. Pleasant and cooperative.     Treatments and/or interventions provided: Labs, CT head, xrays, medication, monitoring.     Patient's response to treatments and/or interventions: Tolerated well  Labs Ordered and Resulted from Time of ED Arrival Up to the Time of Departure from the ED   BASIC METABOLIC PANEL - Abnormal; Notable for the following components:       Result Value    Anion Gap 2 (*)     Glucose 102 (*)     GFR Estimate 60 (*)     All other components within normal limits   INR - Abnormal; Notable for the following components:    INR 1.26 (*)     All other components within normal limits   CBC WITH PLATELETS DIFFERENTIAL   SARS-COV-2 (COVID-19) VIRUS RT-PCR   ABO/RH TYPE AND SCREEN     XR Chest 1 View   Final Result   IMPRESSION: Lungs appear clear. Mild elevation of the left hemidiaphragm. No pleural fluid or pneumothorax. Normal heart size and pulmonary vascularity.      XR Pelvis and Hip Left 1 View   Final Result   IMPRESSION: Acute impacted and varus angulated fracture of the left femoral neck. No dislocation.      CT Cervical Spine w/o Contrast   Final Result   IMPRESSION:   1.  Degenerative changes of the cervical spine without evidence of an  acute displaced fracture.      Head CT w/o contrast   Final Result   IMPRESSION:   1.  No CT evidence for acute intracranial process.   2.  Brain atrophy and presumed chronic microvascular ischemic changes as above.            To be done/followed up on inpatient unit:  Continue plan of care    Does this patient have any cognitive concerns?: Alzheimer's    Activity level - Baseline/Home:  Walker  Activity Level - Current:   Total Care    Patient's Preferred language: English   Needed?: No  Not Applicable  Isolation: None  Infection:   Patient tested for COVID 19 prior to admission: YES  Bariatric?: No    Vital Signs:   Vitals:    01/12/21 0148 01/12/21 0203   BP: (!) 139/94    Pulse: 61 60   Resp: 16 19   Temp: 97.6  F (36.4  C)    TempSrc: Oral    SpO2: 97% (!) 87%   Weight: 90.7 kg (200 lb)    Height: 1.829 m (6')        Cardiac Rhythm:     Was the PSS-3 completed:   Yes  What interventions are required if any?               Family Comments: Message left for sister  OBS brochure/video discussed/provided to patient/family: No              Name of person given brochure if not patient: n/a              Relationship to patient: n/a    For the majority of the shift this patient's behavior was Green.   Behavioral interventions performed were Rounding.    ED NURSE PHONE NUMBER: [phone]

## 2021-01-12 NOTE — H&P
[facility]  History and Physical  Hospitalist       Date of Admission:  1/12/2021    Assessment & Plan   Maricarmen Lovell is a very pleasant 86 year old male with Alzheimer's dementia, atrial fibrillation on xarelto, hypertension, COPD/emphysema, h/o intracranial hemorrhage, gait instability with frequent falls, anxiety, depression, CKD stage 3 who was admitted on 1/12/2021 with fall resulting.    Acute left femoral neck fracture  Fall from standing  Frequent falls  Vitamin D deficiency  Fall from standing with resulting left hip pain and inability to ambulate. Radiographic evidence for left femur fracture. Given patient's dementia, would need to obtain family consent for any proposed surgery. Appears medically stable for ORIF if indicated.   -admit to inpatient   -optimize pain control  -orthopedics consultation   -NPO for anticipated surgery later today    Hypertension  Atrial fibrillation on chronic anticoagulation with xarelto  Dylsipidemia  Managed PTA with xarelto and(?), statin.   -hold xarelto   -resume statin after surgery    COPD, emphysema  No acute symptoms and not requiring any supplemental oxygen.  -prn albuterol if needed  -encourage incentive spirometry in post-op period    Alzheimer's Dementia  H/o intracranial hemorrhage  Anxiety, depression  Managed PTA with melatonin at night, effexor.   -continue PTA effexor, melatonin post operatively    Chronic, stable problems:  Slow transit constipation: Resume PTA bowel regimen once verified  CKD stage 3: avoid nephrotoxins    Asymptomatic Rule Out of COVID-19 infection  This patient was evaluated during a global COVID-19 pandemic, which necessitated consideration that the patient might be at risk for infection with the SARS-CoV-2 virus that causes COVID-19. Applicable protocols for evaluation were followed during the patient's care. Low suspicion for infection.   -follow-up COVID-19 PCR test result  -droplet, contact  precautions    DVT prophylaxis: Pneumatic Compression Devices  Code Status: DNR/DNI    Disposition: Expected discharge in 3 days to TCU once recovered from surgery.    Di Zee MD  Text Page    ~~~~~~~~~~~~~~~~~~~~~~~~~~~~~~~~~~~~~~~~~~~~~~~~~~~~~  Primary Care Physician   [facility]    Chief Complaint   Left hip pain    History is obtained from the patient and medical records.    History of Present Illness   Maricarmen Lovell is a very pleasant 86 year old male with Alzheimer's dementia, atrial fibrillation on xarelto, hypertension, COPD/emphysema, h/o intracranial hemorrhage, gait instability with frequent falls, anxiety, depression, CKD stage 3  who presents with left hip pain. Resides at assisted living facility after a fall in his apartment. Unreliable historian but is able to report that he fell toward the wall on his way to the bathroom. Somehow he was helped up off the floor by staff but unclear how long he was laying there. No headache or dizziness. Denies chest pain, shortness of breath, wheezing, abdominal pain. On review of the chart he has had frequent falls for many months, even sustaining an intracranial hemorrhage as a result of one of the falls while on xarelto. Unclear why this was resumed given ongoing high fall risk with h/o documented head bleed.  Case reviewed with Dr. Moscoso from the Emergency Department. Labs and available imaging reviewed.     Past Medical History    I have reviewed this patient's medical history and updated it with pertinent information if needed.   Past Medical History:   Diagnosis Date     Alzheimer disease (H)      Chronic a-fib (H)      CKD (chronic kidney disease) stage 3, GFR 30-59 ml/min      COPD (chronic obstructive pulmonary disease) (H)      Depression      Frequent falls      HTN (hypertension)      Past Surgical History   I have reviewed this patient's surgical history and updated it with pertinent information if needed.  History  reviewed. No pertinent surgical history.    Prior to Admission Medications   Prior to Admission Medications   Prescriptions Last Dose Informant Patient Reported? Taking?   STIMULANT LAXATIVE 8.6-50 MG tablet   No No   Sig: TAKE ONE TABLET BY MOUTH TWICE A DAY FOR CONSTIPATION   acetaminophen (TYLENOL) 325 MG tablet  Nursing Home Yes No   Sig: Take 650 mg by mouth every 4 hours as needed for mild pain   atorvastatin (LIPITOR) 20 MG tablet   No No   Sig: Take 1 tablet (20 mg) by mouth every evening   hypromellose (ARTIFICIAL TEARS) 0.5 % SOLN ophthalmic solution  Nursing Home Yes No   Sig: Place 1 drop into both eyes every hour as needed for dry eyes   melatonin 3 MG tablet  Nursing Home Yes No   Sig: Take 1 mg by mouth At Bedtime   polyethylene glycol (MIRALAX) 17 g packet  intermediate Yes No   Sig: Take 1 packet by mouth daily as needed for constipation   psyllium (METAMUCIL/KONSYL) Packet  Nursing Home Yes No   Sig: Take 1 packet by mouth daily as needed for constipation   venlafaxine (EFFEXOR-ER) 225 MG 24 hr tablet  Nursing Home Yes No   Sig: Take 225 mg by mouth daily   vitamin D3 (CHOLECALCIFEROL) 50 mcg (2000 units) tablet  Nursing Home Yes No   Sig: Take 1 tablet by mouth daily      Facility-Administered Medications: None     Allergies   Allergies   Allergen Reactions     Pecan [Nuts] Anaphylaxis     Fluoxetine Nausea     Juglans        Social History   I have reviewed this patient's social history and updated it with pertinent information if needed. Maricarmen Lovell  reports that he has quit smoking. He has never used smokeless tobacco. He reports that he does not drink alcohol or use drugs.    Family History   I have reviewed this patient's family history and updated it with pertinent information if needed.   History reviewed. No pertinent family history.    Review of Systems   The 10 point Review of Systems is negative other than noted in the HPI or here.    Physical Exam   Temp: 97.6  F (36.4  C) Temp src:  Oral BP: (!) 139/94 Pulse: 68   Resp: 21 SpO2: 98 % O2 Device: None (Room air)(O2 started at 2L/min per nc)    Vital Signs with Ranges  Temp:  [97.6  F (36.4  C)] 97.6  F (36.4  C)  Pulse:  [60-70] 68  Resp:  [15-21] 21  BP: (139)/(94) 139/94  SpO2:  [87 %-98 %] 98 %  200 lbs 0 oz    Constitutional: Alert, NAD, pleasant and interactive  Eyes: PERRL, sclerae anicteric  HEENT: mmm, atraumatic  Respiratory: Lungs CTAB, no wheezes or crackles  Cardiovascular: RRR, no murmurs  no LE edema  GI: soft, non-tender, nondistended  Skin/Integument: warm, dry, no acute rashes  Genitourinary: not examined  Musc: KAT  Neuro: oriented to person and place, no focal deficits, no tremors  Psych: not anxious, not confused      Data   Data reviewed today:  I personally reviewed: Hip xray with left femoral neck fracture. Head CT negative for acute changes.     Recent Labs   Lab 01/12/21  0151   WBC 9.4   HGB 14.7   MCV 98      INR 1.26*      POTASSIUM 4.3   CHLORIDE 107   CO2 30   BUN 28   CR 1.10   ANIONGAP 2*   BEATRIZ 9.1   *       Imaging:  Recent Results (from the past 24 hour(s))   Head CT w/o contrast    Narrative    EXAM: CT HEAD W/O CONTRAST  LOCATION: [facility]  DATE/TIME: 1/12/2021 2:17 AM    INDICATION: Traumatic injury. Anticoagulated.  COMPARISON: Brain MRI dated 09/07/2020  TECHNIQUE: Routine CT Head without IV contrast. Multiplanar reformats. Dose reduction techniques were used.    FINDINGS:  INTRACRANIAL CONTENTS: No intracranial hemorrhage, extraaxial collection, or mass effect.  No CT evidence of acute infarct. Moderate presumed chronic small vessel ischemic changes. Moderate generalized volume loss. No hydrocephalus. Redemonstrated small   right paraclinoid meningioma, which appears hyperdense on the undersurface of the right frontal lobe. Scattered atherosclerotic calcifications.    VISUALIZED ORBITS/SINUSES/MASTOIDS: Prior bilateral cataract surgery. Visualized portions of the orbits  are otherwise unremarkable. No paranasal sinus mucosal disease. No middle ear or mastoid effusion.    BONES/SOFT TISSUES: No acute abnormality.      Impression    IMPRESSION:  1.  No CT evidence for acute intracranial process.  2.  Brain atrophy and presumed chronic microvascular ischemic changes as above.   CT Cervical Spine w/o Contrast    Narrative    EXAM: CT CERVICAL SPINE W/O CONTRAST  LOCATION: [facility]  DATE/TIME: 1/12/2021 2:17 AM    INDICATION: Traumatic injury  COMPARISON: None.  TECHNIQUE: Routine CT Cervical Spine without IV contrast. Multiplanar reformats. Dose reduction techniques were used.    FINDINGS:  VERTEBRA: Normal vertebral body heights and alignment. No fracture or posttraumatic subluxation.     CANAL/FORAMINA: Multilevel degenerative changes of the cervical spine with at least mild canal stenosis at C5-C6. Multilevel bilateral neural foraminal narrowing is greatest at C5-C6 on the left where there is moderate to severe stenosis.    PARASPINAL: No acute extraspinal abnormality.      Impression    IMPRESSION:  1.  Degenerative changes of the cervical spine without evidence of an acute displaced fracture.   XR Pelvis and Hip Left 1 View    Narrative    EXAM: XR PELVIS AND HIP LEFT 1 VIEW  LOCATION: [facility]  DATE/TIME: 1/12/2021 2:14 AM    INDICATION: Pain after fall.  COMPARISON: None.      Impression    IMPRESSION: Acute impacted and varus angulated fracture of the left femoral neck. No dislocation.   XR Chest 1 View    Narrative    EXAM: XR CHEST 1 VW  LOCATION: [facility]  DATE/TIME: 1/12/2021 2:20 AM    INDICATION: Pain after fall. Preoperative evaluation. Hip fracture.  COMPARISON: None.      Impression    IMPRESSION: Lungs appear clear. Mild elevation of the left hemidiaphragm. No pleural fluid or pneumothorax. Normal heart size and pulmonary vascularity.

## 2021-01-12 NOTE — ANESTHESIA POSTPROCEDURE EVALUATION
Patient: Maricarmen Lovell    Procedure(s):  LEFT HIP CEMENTED HEMIARTHROPLASTY    Diagnosis:Hip fracture (H) [S72.009A]  Diagnosis Additional Information: No value filed.    Anesthesia Type:  General    Note:  Anesthesia Post Evaluation    Patient location during evaluation: Bedside  Patient participation: Able to fully participate in evaluation  Level of consciousness: awake and alert  Pain management: adequate  Airway patency: patent  Cardiovascular status: acceptable  Respiratory status: acceptable  Hydration status: acceptable  PONV: none             Last vitals:  Vitals:    01/12/21 1520 01/12/21 1530 01/12/21 1540   BP: 90/53 90/56 94/50   Pulse: 65 68 71   Resp: 16 17 15   Temp:  36.5  C (97.7  F)    SpO2: 96% 96% 98%         Electronically Signed By: Jalil Coulter MD  January 12, 2021  3:44 PM

## 2021-01-12 NOTE — ED PROVIDER NOTES
History   Chief Complaint:  Hip Pain (Left) and Fall       HPI   Maricarmen Lovell is a 86 year old male with history of CKD, atrial fibrillation, COPD, and Alzheimer disease on Xarelto who presents via EMS from an assisted living facility with left hip pan after a fall earlier tonight. The patient says that he was getting up to use the restroom and he fell into the wall and hit his left hip. He says that he was on the floor for 5 minutes before he was helped up. He is unsure if he had any loss of consciousness or hit his head. He denies any chest pain, abdominal pain, fever, shortness of breath, or pain in his arms or legs.         Review of Systems   Constitutional: Negative for fever.   Respiratory: Negative for shortness of breath.    Cardiovascular: Negative for chest pain.   Gastrointestinal: Negative for abdominal pain.   Musculoskeletal: Positive for myalgias. Negative for arthralgias.   All other systems reviewed and are negative.      Allergies:  Pecan [Nuts]  Fluoxetine  Juglans    Medications:  Effexor  Metamucil  Miralax  Melatonin  Lipitor  Xarelto    Past Medical History:    Intercranial hemorrhage   Depression   COPD  Chronic atrial fibrillation   CKD  Alzheimer disease    Malignant neoplasm skin  Acute renal failure     Past Surgical History:    Tonsillectomy  Cataract removal  Capsulotomy     Family History:    Depression     Social History:  Patient presents to the ED alone via EMS from an assisted living facility.     Physical Exam     Patient Vitals for the past 24 hrs:   BP Temp Temp src Pulse Resp SpO2 Height Weight   01/12/21 0203 -- -- -- 60 19 (!) 87 % -- --   01/12/21 0148 (!) 139/94 97.6  F (36.4  C) Oral 61 16 97 % 1.829 m (6') 90.7 kg (200 lb)       Physical Exam  General: Appears well-developed and well-nourished.   Head: No signs of trauma.   Neck: Normal range of motion. No tenderness  CV: Normal rate and regular rhythm.    Resp: Effort normal and breath sounds normal. No respiratory  distress.   GI: Soft. There is no tenderness.  No rebound or guarding.  Normal bowel sounds.    MSK: Normal range of motion.   Neuro: The patient is alert.  Strength in upper/lower extremities normal and symmetrical. Sensation normal. Speech normal.  GCS 15  Skin: Skin is warm and dry. No rash noted.   Psych: normal mood and affect. behavior is normal.       Emergency Department Course     ECG:  ECG taken at 0148, ECG read at 0205  Atrial fibrillation   Abnormal ECG  Rate 60 bpm. WY interval * ms. QRS duration 72 ms. QT/QTc 412/412 ms. P-R-T axes * 51 56.     Imaging:  XR Chest 1 View  Lungs appear clear. Mild elevation of the left hemidiaphragm. No pleural fluid or pneumothorax. Normal heart size and pulmonary vascularity.  Reading per radiology    XR Pelvis and Hip Left 1 View  Acute impacted and varus angulated fracture of the left femoral neck. No dislocation.  Reading per radiology    CT Cervical Spine w/o Contrast  1.  Degenerative changes of the cervical spine without evidence of an acute displaced fracture.  Reading per radiology    Head CT w/o contrast  1.  No CT evidence for acute intracranial process.  2.  Brain atrophy and presumed chronic microvascular ischemic changes as above.  Reading per radiology     Laboratory:    CBC: WBC 9.4, HGB 14.7,   BMP: anion gap 2 (L),  (H), GFR 60 (L) o/w WNL (Creatinine 1.10)  INR: 1.26 (H)  ABO/Rh type and screen: A/pos, neg     Asymptomatic SARS-CoV-2 COVID-19 Virus (Coronavirus) by PCR : negative     Procedures    Emergency Department Course:    Reviewed:  0146 I reviewed the patient's nursing notes, vitals, past medical records, Care Everywhere.        Assessments:  0146 I performed an exam of the patient as documented above.     Consults:   0300 I spoke with Dr. Salas of the hospitalist service regarding patient's presentation, findings, and plan of care.     Interventions:  0246 Morphine 4 mg IV       Disposition:  The patient was admitted to the  [facility]\"" under the care of Dr. Salas .       Impression & Plan       Medical Decision Making:  Maricarmen Lovell is a 86 year old male who presents to the emergency department today for evaluation of left hip pain.  Patient reports that he had gotten up to go to the bathroom and fell onto his left hip.  He denies any other injury.  Given his age and the fact he is on a blood thinner I did obtain a CT scan of the head which does not show signs of acute process.  X-ray of the left hip did show a left femoral neck fracture.  Patient was admitted to the hospitalist service with the plan for orthopedic consultation.      Covid-19  Maricarmen Lovell was evaluated during a global COVID-19 pandemic, which necessitated consideration that the patient might be at risk for infection with the SARS-CoV-2 virus that causes COVID-19.   Applicable protocols for evaluation were followed during the patient's care.   COVID-19 was considered as part of the patient's evaluation. The plan for testing is:  a test was obtained during this visit.       Diagnosis:    ICD-10-CM    1. Hip fracture, left, closed, initial encounter (H)  S72.002A    2. Fall from standing, initial encounter  W19.XXXA        Scribe Disclosure:  I, Suleman Cintron, am serving as a scribe at 1:46 AM on 1/12/2021 to document services personally performed by Lan Moscoso MD based on my observations and the provider's statements to me.          Lan Moscoso MD  01/12/21 8926

## 2021-01-12 NOTE — OR NURSING
Pt still sleeping, open his eyes and back to sleep. Airway was discontinued. Pt incontinent , dependants on.

## 2021-01-12 NOTE — CONSULTS
[facility]    Orthopedic Consultation    Maricarmen Lovell MRN# [id number]   Age: 86 year old YOB: 1934     Date of Admission:  1/12/2021    Reason for consult: Hip fracture       Requesting physician: Dr. Zee       Level of consult: Consult, follow and place orders           Assessment and Plan:   Assessment:   Left femoral neck fracture, fall from standing      Plan:   Plan for OR today for left hip hemiarthroplasty today with Dr. Gaines  NPO  Bed rest  Pain medication as needed, limit narcotics as able  Hold Xarelto  Type and screen  COVID negative  Pre-op optimization per hospitalist           Chief Complaint:   Left hip fracture         History of Present Illness:   This patient is a 86 year old male who presents with the following condition requiring a hospital admission:    Patient pleasantly demented, HPI collected from chart review  The patient says that he was getting up to use the restroom and he fell into the wall and hit his left hip. He says that he was on the floor for 5 minutes before he was helped up. He is unsure if he had any loss of consciousness or hit his head. He denies any chest pain, abdominal pain, fever, shortness of breath, or pain in his arms or legs.   He is anticoagulated on Xarelto due to chronic a. Fib.          Past Medical History:     Past Medical History:   Diagnosis Date     Alzheimer disease (H)      Chronic a-fib (H)      CKD (chronic kidney disease) stage 3, GFR 30-59 ml/min      COPD (chronic obstructive pulmonary disease) (H)      Depression      Frequent falls      HTN (hypertension)              Past Surgical History:   History reviewed. No pertinent surgical history.          Social History:     Social History     Tobacco Use     Smoking status: Former Smoker     Smokeless tobacco: Never Used   Substance Use Topics     Alcohol use: Never     Frequency: Never             Family History:   History reviewed. No pertinent family  history.          Immunizations:     VACCINE/DOSE   Diptheria   DPT   DTAP   HBIG   Hepatitis A   Hepatitis B   HIB   Influenza   Measles   Meningococcal   MMR   Mumps   Pneumococcal   Polio   Rubella   Small Pox   TDAP   Varicella   Zoster             Allergies:     Allergies   Allergen Reactions     Pecan [Nuts] Anaphylaxis     Fluoxetine Nausea     Juglans              Medications:     Current Facility-Administered Medications   Medication     acetaminophen (TYLENOL) Suppository 650 mg     acetaminophen (TYLENOL) tablet 650 mg     atorvastatin (LIPITOR) tablet 20 mg     bisacodyl (DULCOLAX) Suppository 10 mg     carboxymethylcellulose PF (REFRESH PLUS) 0.5 % ophthalmic solution 2 drop     lidocaine (LMX4) cream     lidocaine 1 % 0.1-1 mL     melatonin tablet 5 mg     naloxone (NARCAN) injection 0.2 mg    Or     naloxone (NARCAN) injection 0.4 mg    Or     naloxone (NARCAN) injection 0.2 mg    Or     naloxone (NARCAN) injection 0.4 mg     ondansetron (ZOFRAN-ODT) ODT tab 4 mg    Or     ondansetron (ZOFRAN) injection 4 mg     oxyCODONE (ROXICODONE) tablet 5 mg     polyethylene glycol (MIRALAX) Packet 17 g     psyllium (METAMUCIL/KONSYL) Packet 1 packet     QUEtiapine (SEROquel) half-tab 12.5 mg     senna-docusate (SENOKOT-S/PERICOLACE) 8.6-50 MG per tablet 1 tablet    Or     senna-docusate (SENOKOT-S/PERICOLACE) 8.6-50 MG per tablet 2 tablet     sodium chloride (PF) 0.9% PF flush 3 mL     sodium chloride (PF) 0.9% PF flush 3 mL     sodium chloride 0.9% infusion     venlafaxine (EFFEXOR-XR) 24 hr capsule 225 mg             Review of Systems:   CV: NEGATIVE for chest pain, palpitations or peripheral edema  C: NEGATIVE for fever, chills, change in weight  E/M: NEGATIVE for ear, mouth and throat problems  R: NEGATIVE for significant cough or SOB          Physical Exam:   All vitals have been reviewed  Patient Vitals for the past 24 hrs:   BP Temp Temp src Pulse Resp SpO2 Height Weight   01/12/21 0807 -- -- -- -- -- 93  % -- --   01/12/21 0805 119/66 97.2  F (36.2  C) Oral 64 16 (!) 87 % -- --   01/12/21 0418 135/79 97.9  F (36.6  C) Oral 61 18 95 % -- --   01/12/21 0345 -- -- -- 68 21 98 % -- --   01/12/21 0340 -- -- -- 68 20 97 % -- --   01/12/21 0330 -- -- -- 68 21 98 % -- --   01/12/21 0315 -- -- -- 70 15 98 % -- --   01/12/21 0203 -- -- -- 60 19 (!) 87 % -- --   01/12/21 0148 (!) 139/94 97.6  F (36.4  C) Oral 61 16 97 % 1.829 m (6') 90.7 kg (200 lb)     No intake or output data in the 24 hours ending 01/12/21 0855  Patient laying comfortably in bed  Left LE mildly shortened   Internally rotated  Skin intact without laceration/abrasions  Mild ecchymosis  Bilateral calves are soft, non-tender.  Bilateral lower extremity is NVI.  Sensation intact bilateral lower extremities  active dorsi and plantar flexion bilaterally  +Dp pulse            Data:   All laboratory data reviewed  Results for orders placed or performed during the hospital encounter of 01/12/21   XR Pelvis and Hip Left 1 View     Status: None    Narrative    EXAM: XR PELVIS AND HIP LEFT 1 VIEW  LOCATION: [facility]  DATE/TIME: 1/12/2021 2:14 AM    INDICATION: Pain after fall.  COMPARISON: None.      Impression    IMPRESSION: Acute impacted and varus angulated fracture of the left femoral neck. No dislocation.   Head CT w/o contrast     Status: None    Narrative    EXAM: CT HEAD W/O CONTRAST  LOCATION: [facility]  DATE/TIME: 1/12/2021 2:17 AM    INDICATION: Traumatic injury. Anticoagulated.  COMPARISON: Brain MRI dated 09/07/2020  TECHNIQUE: Routine CT Head without IV contrast. Multiplanar reformats. Dose reduction techniques were used.    FINDINGS:  INTRACRANIAL CONTENTS: No intracranial hemorrhage, extraaxial collection, or mass effect.  No CT evidence of acute infarct. Moderate presumed chronic small vessel ischemic changes. Moderate generalized volume loss. No hydrocephalus. Redemonstrated small   right paraclinoid meningioma, which  appears hyperdense on the undersurface of the right frontal lobe. Scattered atherosclerotic calcifications.    VISUALIZED ORBITS/SINUSES/MASTOIDS: Prior bilateral cataract surgery. Visualized portions of the orbits are otherwise unremarkable. No paranasal sinus mucosal disease. No middle ear or mastoid effusion.    BONES/SOFT TISSUES: No acute abnormality.      Impression    IMPRESSION:  1.  No CT evidence for acute intracranial process.  2.  Brain atrophy and presumed chronic microvascular ischemic changes as above.   CT Cervical Spine w/o Contrast     Status: None    Narrative    EXAM: CT CERVICAL SPINE W/O CONTRAST  LOCATION: [facility]  DATE/TIME: 1/12/2021 2:17 AM    INDICATION: Traumatic injury  COMPARISON: None.  TECHNIQUE: Routine CT Cervical Spine without IV contrast. Multiplanar reformats. Dose reduction techniques were used.    FINDINGS:  VERTEBRA: Normal vertebral body heights and alignment. No fracture or posttraumatic subluxation.     CANAL/FORAMINA: Multilevel degenerative changes of the cervical spine with at least mild canal stenosis at C5-C6. Multilevel bilateral neural foraminal narrowing is greatest at C5-C6 on the left where there is moderate to severe stenosis.    PARASPINAL: No acute extraspinal abnormality.      Impression    IMPRESSION:  1.  Degenerative changes of the cervical spine without evidence of an acute displaced fracture.   XR Chest 1 View     Status: None    Narrative    EXAM: XR CHEST 1 VW  LOCATION: [facility]  DATE/TIME: 1/12/2021 2:20 AM    INDICATION: Pain after fall. Preoperative evaluation. Hip fracture.  COMPARISON: None.      Impression    IMPRESSION: Lungs appear clear. Mild elevation of the left hemidiaphragm. No pleural fluid or pneumothorax. Normal heart size and pulmonary vascularity.   CBC with platelets differential     Status: None   Result Value Ref Range    WBC 9.4 4.0 - 11.0 10e9/L    RBC Count 4.53 4.4 - 5.9 10e12/L    Hemoglobin  14.7 13.3 - 17.7 g/dL    Hematocrit 44.3 40.0 - 53.0 %    MCV 98 78 - 100 fl    MCH 32.5 26.5 - 33.0 pg    MCHC 33.2 31.5 - 36.5 g/dL    RDW 12.4 10.0 - 15.0 %    Platelet Count 211 150 - 450 10e9/L    Diff Method Automated Method     % Neutrophils 54.3 %    % Lymphocytes 33.3 %    % Monocytes 8.3 %    % Eosinophils 2.9 %    % Basophils 0.5 %    % Immature Granulocytes 0.7 %    Nucleated RBCs 0 0 /100    Absolute Neutrophil 5.1 1.6 - 8.3 10e9/L    Absolute Lymphocytes 3.1 0.8 - 5.3 10e9/L    Absolute Monocytes 0.8 0.0 - 1.3 10e9/L    Absolute Eosinophils 0.3 0.0 - 0.7 10e9/L    Absolute Basophils 0.1 0.0 - 0.2 10e9/L    Abs Immature Granulocytes 0.1 0 - 0.4 10e9/L    Absolute Nucleated RBC 0.0    Basic metabolic panel     Status: Abnormal   Result Value Ref Range    Sodium 139 133 - 144 mmol/L    Potassium 4.3 3.4 - 5.3 mmol/L    Chloride 107 94 - 109 mmol/L    Carbon Dioxide 30 20 - 32 mmol/L    Anion Gap 2 (L) 3 - 14 mmol/L    Glucose 102 (H) 70 - 99 mg/dL    Urea Nitrogen 28 7 - 30 mg/dL    Creatinine 1.10 0.66 - 1.25 mg/dL    GFR Estimate 60 (L) >60 mL/min/[1.73_m2]    GFR Estimate If Black 70 >60 mL/min/[1.73_m2]    Calcium 9.1 8.5 - 10.1 mg/dL   INR     Status: Abnormal   Result Value Ref Range    INR 1.26 (H) 0.86 - 1.14   Asymptomatic SARS-CoV-2 COVID-19 Virus (Coronavirus) by PCR     Status: None    Specimen: Nasopharyngeal   Result Value Ref Range    SARS-CoV-2 Virus Specimen Source Nasopharyngeal     SARS-CoV-2 PCR Result NEGATIVE     SARS-CoV-2 PCR Comment (Note)    EKG 12-lead, tracing only     Status: None (Preliminary result)   Result Value Ref Range    Interpretation ECG Click View Image link to view waveform and result    ABO/Rh type and screen     Status: None   Result Value Ref Range    ABO A     RH(D) Pos     Antibody Screen Neg     Test Valid Only At [facility]        Specimen Expires 01/15/2021           Attestation:  I have reviewed today's vital signs, notes, medications,  labs and imaging with Dr. Gaines.  Amount of time performed on this consult: 30 minutes.    Olive Lockhart PA-C

## 2021-01-12 NOTE — PROGRESS NOTES
Arrived on unit around 0415. A/Ox4, but very slow to respond and does not always follow commands. VSS on 2L NC. PRN oxycodone for pain. Has been NPO. Ortho consult today. Will continue to monitor.

## 2021-01-12 NOTE — PROGRESS NOTES
Hospitalist update note    Patient admitted this morning by Dr. Zee. Plan for left hip hemiarthroplasty today.     - High risk for delirium. Delirium protocol ordered with PRN Seroquel and Haldol available. Hold home scheduled Seroquel for now to avoid oversedation post-op    JESSICA Jin MD  Hospitalist  [phone]

## 2021-01-12 NOTE — PROGRESS NOTES
[facility]    Medicine Progress Note - Hospitalist Service       Date of Admission:  1/12/2021    Assessment & Plan   Maricarmen Lovell is a very pleasant 86 year old male with Alzheimer's dementia, frequent falls, chronic a-fib on chronic anticoagulation with rivaroxaban, HTN, COPD/emphysema, prior ICH, and CKD who was admitted on 1/12/2021 for an acute left femoral neck fracture following a fall.     Acute left femoral neck fracture following fall  History of frequent falls  History of Vitamin D deficiency  Fall from standing with resulting left hip pain and inability to ambulate. Radiographic evidence on arrival for left femur fracture. Ortho was consulted on admission, and patient underwent uncomplicated right hip hemiarthroplasty by Dr. Gaines on 1/12.  - Post-op cares as per Ortho  - PT/OT    Right-sided aspiration pneumonia with sepsis  Fever to 100.2 noted post-op with leukocytosis (14.6) and cough. CXR (1/13) showed evolving right-sided infiltrate  - Start Unasyn today, 1/13  - SLP has been consulted. Recommending NPO for now  - 1/13 blood cultures x2 pending     Chronic atrial fibrillation  - Auto rate-controlled  - Metoprolol 5 mg IV PRN for HR>120  - He has been resumed on PTA rivaroxaban by Ortho    Dyslipidemia  Chronic and stable on atorvastatin    CKD stage III  Creatinine within baseline 1-1.1    Chronic slow transit constipation  - Continues on PTA stool softeners    COPD, emphysema  Chronic and stable. Not on bronchodilators PTA    History ICH  Non-active.    Alzheimer's Dementia with behavioral disturbance  Major recurrent depressive disorder with anxiety  [PTA: venlafaxine 225 mg daily, melatonin 3 mg qhs, Seroquel 12.5 mg qhs, BID prn]   - On delirium protocol   - Continues on PTA meds    Diet: NPO per Anesthesia Guidelines for Procedure/Surgery Except for: Meds , D5NS at 100 ml/h  DVT Prophylaxis: Rivaroxaban  Solorio Catheter: not present  Code Status: No CPR- Do NOT  Intubate         Disposition: Expected discharge back to care facility once taking PO and on oral antibiotics, ?Fri    The patient's care was discussed with the Bedside Nurse    Di Jin MD  Hospitalist [facility]  Contact information available via [facility] Paging/Directory    ______________________________________________________________________    Interval History   Fever overnight. Unable to provide ROS due to dementia. Denies pain.     Time Spent on this Encounter   I spent 35 minutes on the unit/floor managing the care of Maricarmen Lvoell. Over 50% of my time was spent on the following:   - Coordination of care with the: nurse    Di Jin MD      Data reviewed today: I reviewed all medications, new labs and imaging results over the last 24 hours. I personally reviewed the chest x-ray image(s) showing evolving right-sided infiltrate.    Physical Exam   Vital Signs: Temp: 97.2  F (36.2  C) Temp src: Oral BP: 119/66 Pulse: 64   Resp: 16 SpO2: 93 % O2 Device: Nasal cannula Oxygen Delivery: 2 LPM  Weight: 200 lbs 0 oz  Constitutional: Resting comfortably, NAD  HEENT: Sclera white, MMM  Respiratory: Breathing non-labored. Lungs CTAB - no wheezes, crackles, or rhonchi  Cardiovascular: Heart irregular rhythm, normal rate. No pedal edema  GI: +BS, abd soft/NT  Skin/Integument: No rash  Musculoskeletal: Normal muscle bulk and tone  Neuro: Alert, pleasantly confused, does not follow commands, KAT  Psych: Calm and cooperative    Data   Recent Labs   Lab 01/12/21  0151   WBC 9.4   HGB 14.7   MCV 98      INR 1.26*      POTASSIUM 4.3   CHLORIDE 107   CO2 30   BUN 28   CR 1.10   ANIONGAP 2*   BEATRIZ 9.1   *         Recent Results (from the past 24 hour(s))   Head CT w/o contrast    Narrative    EXAM: CT HEAD W/O CONTRAST  LOCATION: [facility]  DATE/TIME: 1/12/2021 2:17 AM    INDICATION: Traumatic injury. Anticoagulated.  COMPARISON: Brain MRI  dated 09/07/2020  TECHNIQUE: Routine CT Head without IV contrast. Multiplanar reformats. Dose reduction techniques were used.    FINDINGS:  INTRACRANIAL CONTENTS: No intracranial hemorrhage, extraaxial collection, or mass effect.  No CT evidence of acute infarct. Moderate presumed chronic small vessel ischemic changes. Moderate generalized volume loss. No hydrocephalus. Redemonstrated small   right paraclinoid meningioma, which appears hyperdense on the undersurface of the right frontal lobe. Scattered atherosclerotic calcifications.    VISUALIZED ORBITS/SINUSES/MASTOIDS: Prior bilateral cataract surgery. Visualized portions of the orbits are otherwise unremarkable. No paranasal sinus mucosal disease. No middle ear or mastoid effusion.    BONES/SOFT TISSUES: No acute abnormality.      Impression    IMPRESSION:  1.  No CT evidence for acute intracranial process.  2.  Brain atrophy and presumed chronic microvascular ischemic changes as above.   CT Cervical Spine w/o Contrast    Narrative    EXAM: CT CERVICAL SPINE W/O CONTRAST  LOCATION: [facility]  DATE/TIME: 1/12/2021 2:17 AM    INDICATION: Traumatic injury  COMPARISON: None.  TECHNIQUE: Routine CT Cervical Spine without IV contrast. Multiplanar reformats. Dose reduction techniques were used.    FINDINGS:  VERTEBRA: Normal vertebral body heights and alignment. No fracture or posttraumatic subluxation.     CANAL/FORAMINA: Multilevel degenerative changes of the cervical spine with at least mild canal stenosis at C5-C6. Multilevel bilateral neural foraminal narrowing is greatest at C5-C6 on the left where there is moderate to severe stenosis.    PARASPINAL: No acute extraspinal abnormality.      Impression    IMPRESSION:  1.  Degenerative changes of the cervical spine without evidence of an acute displaced fracture.   XR Pelvis and Hip Left 1 View    Narrative    EXAM: XR PELVIS AND HIP LEFT 1 VIEW  LOCATION: [facility]  DATE/TIME: 1/12/2021  2:14 AM    INDICATION: Pain after fall.  COMPARISON: None.      Impression    IMPRESSION: Acute impacted and varus angulated fracture of the left femoral neck. No dislocation.   XR Chest 1 View    Narrative    EXAM: XR CHEST 1 VW  LOCATION: [facility]  DATE/TIME: 1/12/2021 2:20 AM    INDICATION: Pain after fall. Preoperative evaluation. Hip fracture.  COMPARISON: None.      Impression    IMPRESSION: Lungs appear clear. Mild elevation of the left hemidiaphragm. No pleural fluid or pneumothorax. Normal heart size and pulmonary vascularity.       Medications     sodium chloride 75 mL/hr at 01/12/21 0552       atorvastatin  20 mg Oral QPM     sodium chloride (PF)  3 mL Intracatheter Q8H     venlafaxine  225 mg Oral QPM

## 2021-01-13 ENCOUNTER — APPOINTMENT (OUTPATIENT)
Dept: GENERAL RADIOLOGY | Facility: CLINIC | Age: 86
DRG: 521 | End: 2021-01-13
Attending: INTERNAL MEDICINE
Payer: COMMERCIAL

## 2021-01-13 ENCOUNTER — APPOINTMENT (OUTPATIENT)
Dept: SPEECH THERAPY | Facility: CLINIC | Age: 86
DRG: 521 | End: 2021-01-13
Attending: INTERNAL MEDICINE
Payer: COMMERCIAL

## 2021-01-13 ENCOUNTER — APPOINTMENT (OUTPATIENT)
Dept: PHYSICAL THERAPY | Facility: CLINIC | Age: 86
DRG: 521 | End: 2021-01-13
Attending: PHYSICIAN ASSISTANT
Payer: COMMERCIAL

## 2021-01-13 LAB
BASOPHILS # BLD AUTO: 0 10E9/L (ref 0–0.2)
BASOPHILS NFR BLD AUTO: 0.2 %
DIFFERENTIAL METHOD BLD: ABNORMAL
EOSINOPHIL # BLD AUTO: 0 10E9/L (ref 0–0.7)
EOSINOPHIL NFR BLD AUTO: 0.3 %
ERYTHROCYTE [DISTWIDTH] IN BLOOD BY AUTOMATED COUNT: 12.3 % (ref 10–15)
HCT VFR BLD AUTO: 37.1 % (ref 40–53)
HGB BLD-MCNC: 12.4 G/DL (ref 13.3–17.7)
IMM GRANULOCYTES # BLD: 0.1 10E9/L (ref 0–0.4)
IMM GRANULOCYTES NFR BLD: 0.5 %
LYMPHOCYTES # BLD AUTO: 2 10E9/L (ref 0.8–5.3)
LYMPHOCYTES NFR BLD AUTO: 13.7 %
MCH RBC QN AUTO: 32.8 PG (ref 26.5–33)
MCHC RBC AUTO-ENTMCNC: 33.4 G/DL (ref 31.5–36.5)
MCV RBC AUTO: 98 FL (ref 78–100)
MONOCYTES # BLD AUTO: 1.3 10E9/L (ref 0–1.3)
MONOCYTES NFR BLD AUTO: 8.8 %
NEUTROPHILS # BLD AUTO: 11.2 10E9/L (ref 1.6–8.3)
NEUTROPHILS NFR BLD AUTO: 76.5 %
NRBC # BLD AUTO: 0 10*3/UL
NRBC BLD AUTO-RTO: 0 /100
PLATELET # BLD AUTO: 181 10E9/L (ref 150–450)
RBC # BLD AUTO: 3.78 10E12/L (ref 4.4–5.9)
WBC # BLD AUTO: 14.6 10E9/L (ref 4–11)

## 2021-01-13 PROCEDURE — 36415 COLL VENOUS BLD VENIPUNCTURE: CPT | Performed by: INTERNAL MEDICINE

## 2021-01-13 PROCEDURE — 87040 BLOOD CULTURE FOR BACTERIA: CPT | Performed by: INTERNAL MEDICINE

## 2021-01-13 PROCEDURE — 258N000003 HC RX IP 258 OP 636: Performed by: INTERNAL MEDICINE

## 2021-01-13 PROCEDURE — 82306 VITAMIN D 25 HYDROXY: CPT | Performed by: PHYSICIAN ASSISTANT

## 2021-01-13 PROCEDURE — 97530 THERAPEUTIC ACTIVITIES: CPT | Mod: GP | Performed by: PHYSICAL THERAPIST

## 2021-01-13 PROCEDURE — 250N000013 HC RX MED GY IP 250 OP 250 PS 637: Performed by: INTERNAL MEDICINE

## 2021-01-13 PROCEDURE — 97162 PT EVAL MOD COMPLEX 30 MIN: CPT | Mod: GP | Performed by: PHYSICAL THERAPIST

## 2021-01-13 PROCEDURE — 250N000011 HC RX IP 250 OP 636: Performed by: PHYSICIAN ASSISTANT

## 2021-01-13 PROCEDURE — 120N000001 HC R&B MED SURG/OB

## 2021-01-13 PROCEDURE — 250N000011 HC RX IP 250 OP 636: Performed by: INTERNAL MEDICINE

## 2021-01-13 PROCEDURE — 92610 EVALUATE SWALLOWING FUNCTION: CPT | Mod: GN

## 2021-01-13 PROCEDURE — 258N000003 HC RX IP 258 OP 636: Performed by: PHYSICIAN ASSISTANT

## 2021-01-13 PROCEDURE — 99233 SBSQ HOSP IP/OBS HIGH 50: CPT | Performed by: INTERNAL MEDICINE

## 2021-01-13 PROCEDURE — 85025 COMPLETE CBC W/AUTO DIFF WBC: CPT | Performed by: INTERNAL MEDICINE

## 2021-01-13 PROCEDURE — 71045 X-RAY EXAM CHEST 1 VIEW: CPT

## 2021-01-13 PROCEDURE — 36415 COLL VENOUS BLD VENIPUNCTURE: CPT | Performed by: PHYSICIAN ASSISTANT

## 2021-01-13 RX ORDER — NALOXONE HYDROCHLORIDE 0.4 MG/ML
0.2 INJECTION, SOLUTION INTRAMUSCULAR; INTRAVENOUS; SUBCUTANEOUS
Status: DISCONTINUED | OUTPATIENT
Start: 2021-01-13 | End: 2021-01-16 | Stop reason: HOSPADM

## 2021-01-13 RX ORDER — PROCHLORPERAZINE MALEATE 5 MG
5 TABLET ORAL EVERY 6 HOURS PRN
Status: DISCONTINUED | OUTPATIENT
Start: 2021-01-13 | End: 2021-01-16 | Stop reason: HOSPADM

## 2021-01-13 RX ORDER — SODIUM CHLORIDE, SODIUM LACTATE, POTASSIUM CHLORIDE, CALCIUM CHLORIDE 600; 310; 30; 20 MG/100ML; MG/100ML; MG/100ML; MG/100ML
INJECTION, SOLUTION INTRAVENOUS CONTINUOUS
Status: DISCONTINUED | OUTPATIENT
Start: 2021-01-13 | End: 2021-01-13

## 2021-01-13 RX ORDER — BISACODYL 10 MG
10 SUPPOSITORY, RECTAL RECTAL DAILY PRN
Status: DISCONTINUED | OUTPATIENT
Start: 2021-01-13 | End: 2021-01-16 | Stop reason: HOSPADM

## 2021-01-13 RX ORDER — NALOXONE HYDROCHLORIDE 0.4 MG/ML
0.4 INJECTION, SOLUTION INTRAMUSCULAR; INTRAVENOUS; SUBCUTANEOUS
Status: DISCONTINUED | OUTPATIENT
Start: 2021-01-13 | End: 2021-01-16 | Stop reason: HOSPADM

## 2021-01-13 RX ORDER — CEFAZOLIN SODIUM 2 G/100ML
2 INJECTION, SOLUTION INTRAVENOUS EVERY 8 HOURS
Status: DISCONTINUED | OUTPATIENT
Start: 2021-01-13 | End: 2021-01-13

## 2021-01-13 RX ORDER — ACETAMINOPHEN 325 MG/1
650 TABLET ORAL EVERY 4 HOURS PRN
Status: DISCONTINUED | OUTPATIENT
Start: 2021-01-15 | End: 2021-01-16 | Stop reason: HOSPADM

## 2021-01-13 RX ORDER — OXYCODONE HYDROCHLORIDE 5 MG/1
5 TABLET ORAL EVERY 4 HOURS PRN
Status: DISCONTINUED | OUTPATIENT
Start: 2021-01-13 | End: 2021-01-16 | Stop reason: HOSPADM

## 2021-01-13 RX ORDER — LIDOCAINE 40 MG/G
CREAM TOPICAL
Status: DISCONTINUED | OUTPATIENT
Start: 2021-01-13 | End: 2021-01-16 | Stop reason: HOSPADM

## 2021-01-13 RX ORDER — HYDROMORPHONE HYDROCHLORIDE 1 MG/ML
0.4 INJECTION, SOLUTION INTRAMUSCULAR; INTRAVENOUS; SUBCUTANEOUS
Status: DISCONTINUED | OUTPATIENT
Start: 2021-01-13 | End: 2021-01-16 | Stop reason: HOSPADM

## 2021-01-13 RX ORDER — HYDROMORPHONE HYDROCHLORIDE 1 MG/ML
0.2 INJECTION, SOLUTION INTRAMUSCULAR; INTRAVENOUS; SUBCUTANEOUS
Status: DISCONTINUED | OUTPATIENT
Start: 2021-01-13 | End: 2021-01-16 | Stop reason: HOSPADM

## 2021-01-13 RX ORDER — ACETAMINOPHEN 325 MG/1
975 TABLET ORAL EVERY 8 HOURS
Status: DISPENSED | OUTPATIENT
Start: 2021-01-13 | End: 2021-01-16

## 2021-01-13 RX ORDER — AMPICILLIN AND SULBACTAM 2; 1 G/1; G/1
3 INJECTION, POWDER, FOR SOLUTION INTRAMUSCULAR; INTRAVENOUS EVERY 6 HOURS
Status: DISCONTINUED | OUTPATIENT
Start: 2021-01-13 | End: 2021-01-16 | Stop reason: HOSPADM

## 2021-01-13 RX ADMIN — HYDROMORPHONE HYDROCHLORIDE 0.2 MG: 1 INJECTION, SOLUTION INTRAMUSCULAR; INTRAVENOUS; SUBCUTANEOUS at 18:39

## 2021-01-13 RX ADMIN — SODIUM CHLORIDE, POTASSIUM CHLORIDE, SODIUM LACTATE AND CALCIUM CHLORIDE: 600; 310; 30; 20 INJECTION, SOLUTION INTRAVENOUS at 08:22

## 2021-01-13 RX ADMIN — ACETAMINOPHEN 650 MG: 325 TABLET, FILM COATED ORAL at 05:14

## 2021-01-13 RX ADMIN — QUETIAPINE 12.5 MG: 25 TABLET, FILM COATED ORAL at 05:14

## 2021-01-13 RX ADMIN — ACETAMINOPHEN 650 MG: 325 TABLET, FILM COATED ORAL at 01:16

## 2021-01-13 RX ADMIN — ENOXAPARIN SODIUM 40 MG: 40 INJECTION SUBCUTANEOUS at 12:07

## 2021-01-13 RX ADMIN — AMPICILLIN AND SULBACTAM 3 G: 2; 1 INJECTION, POWDER, FOR SOLUTION INTRAMUSCULAR; INTRAVENOUS at 14:49

## 2021-01-13 RX ADMIN — AMPICILLIN AND SULBACTAM 3 G: 2; 1 INJECTION, POWDER, FOR SOLUTION INTRAMUSCULAR; INTRAVENOUS at 08:22

## 2021-01-13 RX ADMIN — HYDROMORPHONE HYDROCHLORIDE 0.2 MG: 1 INJECTION, SOLUTION INTRAMUSCULAR; INTRAVENOUS; SUBCUTANEOUS at 18:04

## 2021-01-13 RX ADMIN — AMPICILLIN AND SULBACTAM 3 G: 2; 1 INJECTION, POWDER, FOR SOLUTION INTRAMUSCULAR; INTRAVENOUS at 21:49

## 2021-01-13 RX ADMIN — DEXTROSE AND SODIUM CHLORIDE: 5; 900 INJECTION, SOLUTION INTRAVENOUS at 10:57

## 2021-01-13 RX ADMIN — OXYCODONE HYDROCHLORIDE 5 MG: 5 TABLET ORAL at 01:48

## 2021-01-13 ASSESSMENT — ACTIVITIES OF DAILY LIVING (ADL)
ADLS_ACUITY_SCORE: 26
ADLS_ACUITY_SCORE: 28

## 2021-01-13 NOTE — PROVIDER NOTIFICATION
On call hospitalist notified that patient has fever of 99.9, new cough, and course lung sounds. On call hospitalist ordered, chest x ray and UA.

## 2021-01-13 NOTE — CONSULTS
Care Management Initial Consult    General Information  Assessment completed with: Care Team Member, PRASHANTH Mcmanus from [facility]  Type of CM/SW Visit: Initial Assessment    Primary Care Provider verified and updated as needed: Yes   Readmission within the last 30 days: no previous admission in last 30 days      Reason for Consult: discharge planning  Advance Care Planning: Advance Care Planning Reviewed: other (comment)(unable to verify, has POLST on ACP tabs)          Communication Assessment  Patient's communication style: spoken language (English or Bilingual)    Hearing Difficulty or Deaf: no   Wear Glasses or Blind: yes    Cognitive  Cognitive/Neuro/Behavioral: .WDL except  Level of Consciousness: confused, lethargic  Arousal Level: arouses to voice, arouses to touch/gentle shaking  Orientation: disoriented to, place, time, situation  Mood/Behavior: calm  Best Language: 0 - No aphasia  Speech: slow    Living Environment:   People in home: facility resident     Current living Arrangements: assisted living  Name of Facility: [facility]   Able to return to prior arrangements: yes       Family/Social Support:  Care provided by: other (see comments)(staff)  Provides care for: no one, unable/limited ability to care for self  Marital Status: Single  Sibling(s)          Description of Support System:           Current Resources:   Skilled Home Care Services:    Community Resources:    Equipment currently used at home: walker, rolling  Supplies currently used at home:      Employment/Financial:  Employment Status:          Financial Concerns:             Lifestyle & Psychosocial Needs:        Socioeconomic History     Marital status: Single     Spouse name: Not on file     Number of children: Not on file     Years of education: Not on file     Highest education level: Not on file     Tobacco Use     Smoking status: Former Smoker     Smokeless tobacco: Never Used   Substance and Sexual Activity     Alcohol  use: Never     Frequency: Never     Drug use: Never       Functional Status:  Prior to admission patient needed assistance:              Mental Health Status:  Mental Health Status: No Current Concerns       Chemical Dependency Status:  Chemical Dependency Status: No Current Concerns             Values/Beliefs:  Spiritual, Cultural Beliefs, Worship Practices, Values that affect care:                 Additional Information:  Received return call from PRASHANTH Mcmanus from [facility] who states Maricarmen receives meals 3 times daily, medication administration, safety checks, toileting, all ADLs (grooming, dressing and bathing). Pt was on a regular diet prior to admission.     Should the pt return to the facility he would need to arrive before 1 pm and they do not take weekend admissions.    Pt will mostly likely need TCU at discharge. Discussed with SW. Will continue to follow and await further discharge plans.      Stella Parisi RN BAN  Inpatient Care Coordination  [address] [address]  [email or web address]   Office: [phone]  Fax: [phone]

## 2021-01-13 NOTE — PROGRESS NOTES
Care Coordination:    -During chart review noted pt's address. Pt lives at [facility].  Called [phone] to inquire what kind of services the pt receives. Left message for PRASHANTH Mcmanus.    Stella Parisi RN, BAN  Inpatient Care Coordination  [address] [address]  [email or web address]   Office: [phone]  Fax: [phone]

## 2021-01-13 NOTE — PROGRESS NOTES
01/13/21 1522   Quick Adds   Type of Visit Initial PT Evaluation   Living Environment   People in home facility resident   Current Living Arrangements assisted living   Home Accessibility no concerns   Self-Care   Current Activity Tolerance fair   Equipment Currently Used at Home walker, rolling  (per chart)   Activity/Exercise/Self-Care Comment Unsure of PLOF; patient not able to give history this date   Disability/Function   Fall history within last six months yes   Number of times patient has fallen within last six months 1  (admit; unsure if there have been others)   Change in Functional Status Since Onset of Current Illness/Injury yes  (impaired gait/transfers)   General Information   Onset of Illness/Injury or Date of Surgery 01/12/21   Referring Physician Yara Teran, LILLY   Patient/Family Therapy Goals Statement (PT) not stated   Pertinent History of Current Problem (include personal factors and/or comorbidities that impact the POC) per chart: Maricarmen Lovell is a very pleasant 86 year old male with Alzheimer's dementia, frequent falls, chronic a-fib on chronic anticoagulation with rivaroxaban, HTN, COPD/emphysema, prior ICH, and CKD who was admitted on 1/12/2021 for an acute left femoral neck fracture following a fall; Patient seen POD #1 s/p LEFT HIP CEMENTED HEMIARTHROPLASTY   Existing Precautions/Restrictions fall;hip;90 degree hip flexion;no active hip ABD   Weight-Bearing Status - LLE weight-bearing as tolerated   Cognition   Orientation Status (Cognition) person   Cognitive Status Comments baseline Alzheimer's dementia per chart   Pain Assessment   Patient Currently in Pain Yes, see Vital Sign flowsheet  (grimaces and verbalizes discomfort with movement)   Integumentary/Edema   Integumentary/Edema Comments L hip incision; covered   Posture    Posture Comments forward flexed in sitting at EOB   Range of Motion (ROM)   ROM Comment L LE limited by recent surgery and pain   Strength   Strength  Comments L LE limited by recent surgery and pain; appears strong as patient automatically moving legs and arms against gravity   Bed Mobility   Comment (Bed Mobility) Mod A x 2 for supine to sit at EOB; able to scoot with min A x 2; max A x 2 to return to supine   Transfers   Transfer Safety Comments not safe to trial standing today; patient picking at gown/lines; not fully following commands   Gait/Stairs (Locomotion)   Comment (Gait/Stairs) Not able today; limited by decreased command following   Balance   Balance Comments intact sitting balance at EOB; baseline unsteadiness and falls per chart   Clinical Impression   Criteria for Skilled Therapeutic Intervention yes, treatment indicated   PT Diagnosis (PT) impaired gait/transfers; pain; hip precautions   Influenced by the following impairments pain; impaired cognition; picking at lines, brace, clothes; limited command following   Functional limitations due to impairments above deficits which are impairing independence with functional mobility   Clinical Presentation Evolving/Changing   Clinical Presentation Rationale clinical judgement; level of assist   Clinical Decision Making (Complexity) moderate complexity   Therapy Frequency (PT) 5x/week   Predicted Duration of Therapy Intervention (days/wks) 3 days   Planned Therapy Interventions (PT) gait training;bed mobility training;ROM (range of motion);strengthening;transfer training   Anticipated Equipment Needs at Discharge (PT) walker, rolling;wheelchair   Risk & Benefits of therapy have been explained evaluation/treatment results reviewed;care plan/treatment goals reviewed;risks/benefits reviewed;current/potential barriers reviewed;patient   PT Discharge Planning    PT Discharge Recommendation (DC Rec) Transitional Care Facility   PT Rationale for DC Rec Patient would benefit from trials of PT in hospital and at TCU to maximize return to PLOF; currently limited by impaired cognition   PT Brief overview of current  status  picking at lines and clothing; appears strong and up to EOB with mod A x 2; sat at EOB x 10 minutes; decreased command following   Total Evaluation Time   Total Evaluation Time (Minutes) 10

## 2021-01-13 NOTE — PLAN OF CARE
Pt alert to self only, unable to assess cms, tylenol and oxycodone given for pain, turn and repo, assist of 2, fever t max 100.2 tylenol given, hospitalist paged for temp and new cough, will continue to monitor

## 2021-01-13 NOTE — PROGRESS NOTES
"   21 1009   General Information   Onset of Illness/Injury or Date of Surgery 21   Referring Physician Di Jin MD   Patient/Family Therapy Goal Statement (SLP) unable to state   Pertinent History of Current Problem Per MD note \"86 year old male with Alzheimer's dementia, atrial fibrillation on xarelto, hypertension, COPD/emphysema, h/o intracranial hemorrhage, gait instability with frequent falls, anxiety, depression, CKD stage 3 who was admitted on 2021 with fall resulting.\" Pt had procedure , since increased temp and chest xray revealed Right lobe infiltrates, suspected aspiration pneumonia   General Observations Patient able to state his name and  when prompted, but difficulty following directions and remaining fully alert. involuntary movements in limbs throughout session.    Past History of Dysphagia No history of dysphagia noted in chart review   Type of Evaluation   Type of Evaluation Swallow Evaluation   Oral Motor   Oral Musculature unable to assess due to poor participation/comprehension   Dentition (Oral Motor)   Dentition (Oral Motor) adequate dentition   Facial Symmetry (Oral Motor)   Facial Symmetry (Oral Motor) WNL   Lip Function (Oral Motor)   Lip Range of Motion (Oral Motor) unable/difficult to assess   Tongue Function (Oral Motor)   Tongue ROM (Oral Motor) unable/difficult to assess   Jaw Function (Oral Motor)   Jaw Function (Oral Motor) unable/difficult to assess   Vocal Quality/Secretion Management (Oral Motor)   Vocal Quality (Oral Motor) breathy   General Swallowing Observations   Current Diet/Method of Nutritional Intake (General Swallowing Observations, NIS) NPO   Respiratory Support (General Swallowing Observations) nasal cannula   Swallowing Evaluation Clinical swallow evaluation   Clinical Swallow Evaluation   Feeding Assistance dependent   Additional evaluation(s) completed today No   Clinical Swallow Evaluation Textures Trialed Thin Liquids;Puree " Textures   Clinical Swallow Eval: Thin Liquid Texture Trial   Mode of Presentation, Thin Liquids spoon;cup;fed by clinician   Volume of Liquid or Food Presented ice chips, teaspoon   Oral Phase of Swallow Poor AP movement;Premature pharyngeal entry   Pharyngeal Phase of Swallow repeated swallows;no awareness of problems;impaired   Diagnostic Statement  Patient accepted ice chips and teaspoon of liquids easily, extended oral phase, holding with repeated swallows, suspected premature pharyngeal entry and delayed pharyngeal response. Patient tolerated 1 sip via cup, but with limited coordination of lips around edge of cup, and extended oral stasis.    Clinical Swallow Evaluation: Puree Solid Texture Trial   Mode of Presentation, Puree spoon;fed by clinician   Volume of Puree Presented teaspoon   Oral Phase, Puree Poor AP movement;Residue in oral cavity   Oral Residue, Puree right anterior lateral sulci;left anterior lateral sulci   Pharyngeal Phase, Puree impaired;repeated swallows   Diagnostic Statement .Teaspoon of applesauce, first a small amount placed on lips, limited response to spoon's presence. Second attempt patient opended mouth and accepted teaspoon more appropraitely. Extended oral stasis, multiple swallows noted. no overt s/s of aspiration, however unable to successfully coordinate swallow.    Swallowing Recommendations   Diet Consistency Recommendations NPO   Instrumental Assessment Recommendations instrumental evaluation not recommended at this time   Comment, Swallowing Recommendations Patient presented with reduced coordination of oral and pharyngeal phases of the swallow putting him at an increased risk of aspiration. Patient demonstrated reduced AP transport and multiple swallows utilized for each bolus.    General Therapy Interventions   Planned Therapy Interventions Dysphagia Treatment   Dysphagia treatment Modified diet education;Instruction of safe swallow strategies   SLP Therapy Assessment/Plan    Criteria for Skilled Therapeutic Interventions Met (SLP Eval) yes   SLP Diagnosis Moderate-Severe Oral and Pharyngeal dysphagia   Rehab Potential (SLP Eval) fair, will monitor progress closely   Therapy Frequency (SLP Eval) daily   Predicted Duration of Therapy Intervention (SLP Eval) 1 week   Comment, Therapy Assessment/Plan (SLP) Moderate-severe oral and pharyngeal dysphagia, notably unable to follow commands, and limited oral acceptance of PO made swallow difficult to assess. With thin and puree PO that were accepted by patient extended oral stasis, holding PO and swallowing multiple times before clearing bolus, delayed pharyngeal response and suspected premature pharyngeal entry. SLP recommended remain NPO, SLP will follow-up daily to assess PO safety.    Therapy Plan Review/Discharge Plan (SLP)   Therapy Plan Review (SLP) evaluation/treatment results reviewed;participants included;caregiver   SLP Discharge Planning    SLP Discharge Recommendation (DC Rec) Transitional Care Facility   SLP Rationale for DC Rec Patient is below baseline for function, will likely require ongoing therapy to ensure safe return to PLOF    SLP Brief overview of current status  1. Remain NPO, frequent oral cares. 2. SLP to follow-up daily to assess for safety of PO intake.     Total Evaluation Time   Total Evaluation Time (Minutes) 30

## 2021-01-13 NOTE — PROGRESS NOTES
X-cover    Noted climbing temp, 99.9 despite tylenol. Some respiratory sx  - blood cultures x 2 sites  - CXR  - UA  - hold abx at this point  - cbc in am    Jorge Salas MD

## 2021-01-13 NOTE — PROGRESS NOTES
Orthopedic Surgery  Maricarmen Landaidge  2021  Admit Date:  2021  POD: 1 Day Post-Op   Procedure(s):  LEFT HIP CEMENTED HEMIARTHROPLASTY    Sleeping t/o exam, difficult to awaken.   Patient resting comfortably in bed.    Pain appears controlled  Placed on unasyn for concerns of possible aspiration pneumonia  Patient NPO as he isn't able to swallow currenlty    Vital Sign Ranges  Temperature Temp  Av.4  F (36.9  C)  Min: 97.4  F (36.3  C)  Max: 99.9  F (37.7  C)   Blood pressure Systolic (24hrs), Av , Min:90 , Max:135        Diastolic (24hrs), Av, Min:46, Max:93      Pulse Pulse  Av.6  Min: 59  Max: 86   Respirations Resp  Avg: 15.9  Min: 10  Max: 22   Pulse oximetry SpO2  Av.7 %  Min: 92 %  Max: 98 %       Dressing is clean, dry, and intact.   Minimal erythema of the surrounding skin.   Bilateral calves are soft, non-tender.  Left lower extremity is NVI.  Sensation intact bilateral lower extremities  Patient able to resist dorsi and plantar flexion bilaterally  +Dp pulse    Labs:  Recent Labs   Lab Test 21  01520  0737 20  0803   POTASSIUM 4.3 3.7 3.9     Recent Labs   Lab Test 21  0523 21  0151 20  0737   HGB 12.4* 14.7 15.1     Recent Labs   Lab Test 21  0151 20  0431 20  1511   INR 1.26* 1.24* 1.74*     Recent Labs   Lab Test 21  0523 21  0151 20  0737    211 207       1. PLAN:   Lovenox ordered for DVT prophylaxis while patient is NPO.  Resume Xarelto when he is able to swallow pills    Able to continue unasyn vs ancef for post-op prophylaxis     Mobilize with PT/OT    WBAT Left LE with walker, posterior hip precautions   Continue current pain regiment.   Dressings: Keep intact.  Change if >60% saturated    2. Disposition   Anticipate d/c to TCU likely 2+ days when medically cleared and progressing in PT.    Gloria Aleman PA-C

## 2021-01-13 NOTE — PROGRESS NOTES
Care Management Follow Up    Length of Stay (days): 1    Expected Discharge Date: 01/15/21     Concerns to be Addressed: all concerns addressed in this encounter     Patient plan of care discussed at interdisciplinary rounds: Yes    Anticipated Discharge Disposition: Skilled Nursing Facilty     Anticipated Discharge Services: None  Anticipated Discharge DME: None    Patient/family educated on Medicare website which has current facility and service quality ratings: no  Education Provided on the Discharge Plan:    Patient/Family in Agreement with the Plan: unable to assess    Referrals Placed by CM/SW:  TCU  Private pay costs discussed: Not applicable    Additional Information:  Call received from Emilie with [facility], [phone]. She identified herself as patient's [employer] care coordinator. She asked for an update on patient. Writer noted that TCU is being recommended. She informed writer that patient has a sister (who is the only emergency contact in Epic). She is not involved often with patient outside of hospitalizations. Emilie noted that the last time that patient was at [facility], [facility] to [facility]. Per chart review, [facility]. Call placed to patient's sister Arlene and message left introducing role and asking if referrals should be sent to these facilities. Emilie would like to be updated when patient discharges. Referrals sent to [facility] via [facility].    Will continue to follow      LULU Max

## 2021-01-14 ENCOUNTER — APPOINTMENT (OUTPATIENT)
Dept: SPEECH THERAPY | Facility: CLINIC | Age: 86
DRG: 521 | End: 2021-01-14
Attending: INTERNAL MEDICINE
Payer: COMMERCIAL

## 2021-01-14 ENCOUNTER — APPOINTMENT (OUTPATIENT)
Dept: PHYSICAL THERAPY | Facility: CLINIC | Age: 86
DRG: 521 | End: 2021-01-14
Attending: PHYSICIAN ASSISTANT
Payer: COMMERCIAL

## 2021-01-14 LAB
ALBUMIN UR-MCNC: 30 MG/DL
ANION GAP SERPL CALCULATED.3IONS-SCNC: <1 MMOL/L (ref 3–14)
APPEARANCE UR: CLEAR
BILIRUB UR QL STRIP: NEGATIVE
BUN SERPL-MCNC: 21 MG/DL (ref 7–30)
CALCIUM SERPL-MCNC: 8.3 MG/DL (ref 8.5–10.1)
CHLORIDE SERPL-SCNC: 111 MMOL/L (ref 94–109)
CO2 SERPL-SCNC: 31 MMOL/L (ref 20–32)
COLOR UR AUTO: YELLOW
CREAT SERPL-MCNC: 0.85 MG/DL (ref 0.66–1.25)
ERYTHROCYTE [DISTWIDTH] IN BLOOD BY AUTOMATED COUNT: 12.3 % (ref 10–15)
GFR SERPL CREATININE-BSD FRML MDRD: 78 ML/MIN/{1.73_M2}
GLUCOSE SERPL-MCNC: 155 MG/DL (ref 70–99)
GLUCOSE UR STRIP-MCNC: 300 MG/DL
HCT VFR BLD AUTO: 32.6 % (ref 40–53)
HGB BLD-MCNC: 10.8 G/DL (ref 13.3–17.7)
HGB UR QL STRIP: ABNORMAL
INTERPRETATION ECG - MUSE: NORMAL
KETONES UR STRIP-MCNC: 5 MG/DL
LEUKOCYTE ESTERASE UR QL STRIP: NEGATIVE
MCH RBC QN AUTO: 32.8 PG (ref 26.5–33)
MCHC RBC AUTO-ENTMCNC: 33.1 G/DL (ref 31.5–36.5)
MCV RBC AUTO: 99 FL (ref 78–100)
MUCOUS THREADS #/AREA URNS LPF: PRESENT /LPF
NITRATE UR QL: NEGATIVE
PH UR STRIP: 6 PH (ref 5–7)
PLATELET # BLD AUTO: 129 10E9/L (ref 150–450)
POTASSIUM SERPL-SCNC: 4 MMOL/L (ref 3.4–5.3)
RBC # BLD AUTO: 3.29 10E12/L (ref 4.4–5.9)
RBC #/AREA URNS AUTO: 6 /HPF (ref 0–2)
SODIUM SERPL-SCNC: 142 MMOL/L (ref 133–144)
SOURCE: ABNORMAL
SP GR UR STRIP: 1.03 (ref 1–1.03)
SQUAMOUS #/AREA URNS AUTO: <1 /HPF (ref 0–1)
UROBILINOGEN UR STRIP-MCNC: 2 MG/DL (ref 0–2)
WBC # BLD AUTO: 9.2 10E9/L (ref 4–11)
WBC #/AREA URNS AUTO: 2 /HPF (ref 0–5)

## 2021-01-14 PROCEDURE — 250N000013 HC RX MED GY IP 250 OP 250 PS 637: Performed by: INTERNAL MEDICINE

## 2021-01-14 PROCEDURE — 81001 URINALYSIS AUTO W/SCOPE: CPT | Performed by: INTERNAL MEDICINE

## 2021-01-14 PROCEDURE — 92526 ORAL FUNCTION THERAPY: CPT | Mod: GN | Performed by: SPEECH-LANGUAGE PATHOLOGIST

## 2021-01-14 PROCEDURE — 85027 COMPLETE CBC AUTOMATED: CPT | Performed by: INTERNAL MEDICINE

## 2021-01-14 PROCEDURE — 97110 THERAPEUTIC EXERCISES: CPT | Mod: GP | Performed by: PHYSICAL THERAPIST

## 2021-01-14 PROCEDURE — 99232 SBSQ HOSP IP/OBS MODERATE 35: CPT | Performed by: INTERNAL MEDICINE

## 2021-01-14 PROCEDURE — 250N000013 HC RX MED GY IP 250 OP 250 PS 637: Performed by: PHYSICIAN ASSISTANT

## 2021-01-14 PROCEDURE — 97530 THERAPEUTIC ACTIVITIES: CPT | Mod: GP | Performed by: PHYSICAL THERAPIST

## 2021-01-14 PROCEDURE — 36415 COLL VENOUS BLD VENIPUNCTURE: CPT | Performed by: INTERNAL MEDICINE

## 2021-01-14 PROCEDURE — 250N000011 HC RX IP 250 OP 636: Performed by: PHYSICIAN ASSISTANT

## 2021-01-14 PROCEDURE — 80048 BASIC METABOLIC PNL TOTAL CA: CPT | Performed by: INTERNAL MEDICINE

## 2021-01-14 PROCEDURE — 258N000003 HC RX IP 258 OP 636: Performed by: INTERNAL MEDICINE

## 2021-01-14 PROCEDURE — 250N000011 HC RX IP 250 OP 636: Performed by: INTERNAL MEDICINE

## 2021-01-14 PROCEDURE — 120N000001 HC R&B MED SURG/OB

## 2021-01-14 RX ORDER — ONDANSETRON 4 MG/1
4 TABLET, ORALLY DISINTEGRATING ORAL EVERY 6 HOURS PRN
Qty: 5 TABLET | Refills: 0 | DISCHARGE
Start: 2021-01-14 | End: 2021-02-08

## 2021-01-14 RX ORDER — OXYCODONE HYDROCHLORIDE 5 MG/1
5 TABLET ORAL EVERY 4 HOURS PRN
Qty: 35 TABLET | Refills: 0 | Status: SHIPPED | OUTPATIENT
Start: 2021-01-14 | End: 2021-01-18

## 2021-01-14 RX ADMIN — AMPICILLIN AND SULBACTAM 3 G: 2; 1 INJECTION, POWDER, FOR SOLUTION INTRAMUSCULAR; INTRAVENOUS at 20:50

## 2021-01-14 RX ADMIN — HYDROMORPHONE HYDROCHLORIDE 0.2 MG: 1 INJECTION, SOLUTION INTRAMUSCULAR; INTRAVENOUS; SUBCUTANEOUS at 03:52

## 2021-01-14 RX ADMIN — ACETAMINOPHEN 975 MG: 325 TABLET, FILM COATED ORAL at 13:24

## 2021-01-14 RX ADMIN — QUETIAPINE 12.5 MG: 25 TABLET, FILM COATED ORAL at 02:14

## 2021-01-14 RX ADMIN — DEXTROSE AND SODIUM CHLORIDE: 5; 900 INJECTION, SOLUTION INTRAVENOUS at 16:21

## 2021-01-14 RX ADMIN — QUETIAPINE 12.5 MG: 25 TABLET, FILM COATED ORAL at 20:05

## 2021-01-14 RX ADMIN — DEXTROSE AND SODIUM CHLORIDE: 5; 900 INJECTION, SOLUTION INTRAVENOUS at 01:03

## 2021-01-14 RX ADMIN — AMPICILLIN AND SULBACTAM 3 G: 2; 1 INJECTION, POWDER, FOR SOLUTION INTRAMUSCULAR; INTRAVENOUS at 02:25

## 2021-01-14 RX ADMIN — ATORVASTATIN CALCIUM 20 MG: 20 TABLET, FILM COATED ORAL at 20:04

## 2021-01-14 RX ADMIN — MELATONIN TAB 3 MG 3 MG: 3 TAB at 21:55

## 2021-01-14 RX ADMIN — ENOXAPARIN SODIUM 40 MG: 40 INJECTION SUBCUTANEOUS at 12:52

## 2021-01-14 RX ADMIN — OXYCODONE HYDROCHLORIDE 2.5 MG: 5 TABLET ORAL at 13:24

## 2021-01-14 RX ADMIN — OXYCODONE HYDROCHLORIDE 2.5 MG: 5 TABLET ORAL at 18:01

## 2021-01-14 RX ADMIN — AMPICILLIN AND SULBACTAM 3 G: 2; 1 INJECTION, POWDER, FOR SOLUTION INTRAMUSCULAR; INTRAVENOUS at 13:28

## 2021-01-14 RX ADMIN — VENLAFAXINE HYDROCHLORIDE 225 MG: 75 CAPSULE, EXTENDED RELEASE ORAL at 20:04

## 2021-01-14 RX ADMIN — ACETAMINOPHEN 975 MG: 325 TABLET, FILM COATED ORAL at 21:55

## 2021-01-14 RX ADMIN — OXYCODONE HYDROCHLORIDE 5 MG: 5 TABLET ORAL at 09:01

## 2021-01-14 RX ADMIN — ACETAMINOPHEN 975 MG: 325 TABLET, FILM COATED ORAL at 09:01

## 2021-01-14 RX ADMIN — AMPICILLIN AND SULBACTAM 3 G: 2; 1 INJECTION, POWDER, FOR SOLUTION INTRAMUSCULAR; INTRAVENOUS at 08:41

## 2021-01-14 ASSESSMENT — ACTIVITIES OF DAILY LIVING (ADL)
ADLS_ACUITY_SCORE: 28

## 2021-01-14 NOTE — PLAN OF CARE
Pt alert to self only, lethargic at start of shift, more awake this morning. VSS on 2L. NPO per SLP. Incontinent. Turn/repo q2 hours. IV dilaudid X1 for pain. Continue to monitor.

## 2021-01-14 NOTE — PLAN OF CARE
OT: Orders received. Chart reviewed and discussed with care team.  OT not indicated due to limited ability to mobilize out of bed, confusion.  PT currently seeing, will be managing mobility while here.  Pt would benefit from skilled OT once progresses with mobility and would be able to participate in ADLS.  Defer discharge recommendations to PT.  Will complete orders.

## 2021-01-14 NOTE — PLAN OF CARE
Pt A&Ox1, baseline Alzheimer's, CMS intact, Aquacel dressing C,D,I, O2@3L/NC,LS diminished, NPO per speech/ST following, aspiration precautions, IV Unasyn, incontinent of bowel and bladder, turned and repositioned, dangled with PT at edge of bed this afternoon, IVF, IV Dilaudid for pain management, pt restless at times, picking at gown/brief.

## 2021-01-14 NOTE — PROGRESS NOTES
[facility]    Medicine Progress Note - Hospitalist Service       Date of Admission:  1/12/2021    Assessment & Plan   Maricarmen Lovell is a very pleasant 86 year old male with Alzheimer's dementia, frequent falls, chronic a-fib on chronic anticoagulation with rivaroxaban, HTN, COPD/emphysema, prior ICH, and CKD who was admitted on 1/12/2021 for an acute left femoral neck fracture following a fall.     Acute left femoral neck fracture following fall  History of frequent falls  History of Vitamin D deficiency  Fall from standing with resulting left hip pain and inability to ambulate. Radiographic evidence on arrival for left femur fracture. Ortho was consulted on admission, and patient underwent uncomplicated right hip hemiarthroplasty by Dr. Gaines on 1/12.  - Post-op cares as per Ortho:  - On enoxaparin for VTE ppx while NPO, PTA rivaroxaban once he has been cleared by SLP  - WBAT LLE with walker, posterior hip precautions  - On APAP 975 mg TID, oxycodone and Dilaudid PRN for pain  - PT/OT    # Right-sided aspiration pneumonia with sepsis, chronic dysphagia suspected  Fever to 100.2 noted post-op with leukocytosis (14.6) and cough. CXR (1/13) showed evolving right-sided infiltrate, and patient was initiated on IV Unasyn  - Continues on Unasyn, 1/13 to present  - 1/13 blood cultures x2 NGTD  - Currently NPO as per SLP  - Unable to review goals of care given advanced dementia. Multiple attempts to reach patient's sister have been unsuccessful     Chronic atrial fibrillation  - Auto rate-controlled  - Metoprolol 5 mg IV PRN for HR>120  - He has been resumed on PTA rivaroxaban by Ortho    Dyslipidemia  Chronic and stable on atorvastatin    CKD stage III  Creatinine within baseline 1-1.1    Chronic slow transit constipation  - Continues on PTA stool softeners    COPD, emphysema  Chronic and stable. Not on bronchodilators PTA    History ICH  Non-active.    Alzheimer's Dementia with behavioral  disturbance  Major recurrent depressive disorder with anxiety  [PTA: venlafaxine 225 mg daily, melatonin 3 mg qhs, Seroquel 12.5 mg qhs, BID prn]   - On delirium protocol   - Continues on PTA meds    Diet: Combination Diet Dysphagia Diet Level 1: Pureed; Nectar Thickened Liquids (pre-thickened or use instant food thickener) , D5NS at 100 ml/h  DVT Prophylaxis: Rivaroxaban  Solorio Catheter: not present  Code Status: No CPR- Do NOT Intubate         Disposition: Expected discharge to TCU once cleared by SLP, TBD - otherwise medically ready    The patient's care was discussed with the Bedside Nurse, CC, and SW    Di Jin MD  Hospitalist Service  [facility]  Contact information available via [facility] Paging/Directory  ______________________________________________________________________    Interval History   No events overnight. Denies pain. Unable to reach patient's sister by phone    Di Jin MD      Data reviewed today: I reviewed all medications, new labs and imaging results over the last 24 hours. I personally reviewed no images or EKGs today.    Physical Exam   Vital Signs: Temp: 97.7  F (36.5  C) Temp src: Axillary BP: 103/61 Pulse: 66   Resp: 16 SpO2: 97 % O2 Device: Nasal cannula Oxygen Delivery: 2 LPM  Weight: 189 lbs 9.6 oz  Constitutional: Resting comfortably, NAD  HEENT: Sclera white, MMM  Respiratory: Breathing non-labored. Lungs CTAB - no wheezes, crackles, or rhonchi  Cardiovascular: Heart irregular rhythm, normal rate. No pedal edema  GI: +BS, abd soft/NT  Skin/Integument: No rash  Musculoskeletal: Normal muscle bulk and tone  Neuro: Alert, pleasantly confused, does not follow commands, KAT  Psych: Calm and cooperative    Data   Recent Labs   Lab 01/14/21  0826 01/13/21  0523 01/12/21  0151   WBC 9.2 14.6* 9.4   HGB 10.8* 12.4* 14.7   MCV 99 98 98   * 181 211   INR  --   --  1.26*     --  139   POTASSIUM 4.0  --  4.3   CHLORIDE 111*  --  107    CO2 31  --  30   BUN 21  --  28   CR 0.85  --  1.10   ANIONGAP <1*  --  2*   BEATRIZ 8.3*  --  9.1   *  --  102*         No results found for this or any previous visit (from the past 24 hour(s)).    Medications     dextrose 5% and 0.9% NaCl 100 mL/hr at 01/14/21 0103       acetaminophen  975 mg Oral Q8H     ampicillin-sulbactam (UNASYN) IV  3 g Intravenous Q6H     atorvastatin  20 mg Oral QPM     enoxaparin ANTICOAGULANT  40 mg Subcutaneous Q24H     melatonin  3 mg Oral At Bedtime     QUEtiapine  12.5 mg Oral QPM     [Held by provider] rivaroxaban ANTICOAGULANT  15 mg Oral QAM     sodium chloride (PF)  3 mL Intracatheter Q8H     venlafaxine  225 mg Oral QPM     cholecalciferol  50 mcg Oral Daily

## 2021-01-14 NOTE — PROGRESS NOTES
Orthopedic Surgery  Maricarmen Lovell  2021  Admit Date:  2021  POD 2 Days Post-Op  S/P Procedure(s):  LEFT HIP CEMENTED HEMIARTHROPLASTY    Patient resting comfortably in bed.    Pain controlled.  Tolerating oral intake.    Denies nausea or vomiting  Denies chest pain or shortness of breath  No events overnight.     Alert and orient to person - demented but resting very comfortably   Vital Sign Ranges  Temperature Temp  Av.7  F (37.1  C)  Min: 97.7  F (36.5  C)  Max: 99.6  F (37.6  C)   Blood pressure Systolic (24hrs), Av , Min:102 , Max:124        Diastolic (24hrs), Av, Min:60, Max:71      Pulse Pulse  Av.3  Min: 59  Max: 87   Respirations Resp  Av.3  Min: 16  Max: 18   Pulse oximetry SpO2  Av.3 %  Min: 94 %  Max: 99 %       Dressing is clean, dry, and intact.   Minimal erythema of the surrounding skin.   Bilateral calves are soft, non-tender.  Left lower extremity is NVI.  Sensation intact bilateral lower extremities  Patient able to resist dorsi and plantar flexion bilaterally  +Dp pulse    Labs:  Recent Labs   Lab Test 21  0826 21  01520  0737   POTASSIUM 4.0 4.3 3.7     Recent Labs   Lab Test 21  0826 21  0523 21  0151   HGB 10.8* 12.4* 14.7     Recent Labs   Lab Test 21  0151 20  0431 20  1511   INR 1.26* 1.24* 1.74*     Recent Labs   Lab Test 21  0826 21  0523 21  0151   * 181 211     1. PLAN:              Lovenox ordered for DVT prophylaxis while patient is NPO.  Resume Xarelto when he is able to swallow pills               Able to continue unasyn vs ancef for post-op prophylaxis                Mobilize with PT/OT               WBAT Left LE with walker, posterior hip precautions              Continue current pain regiment.              Dressings: Keep intact.  Change if >60% saturated     2. Disposition              Anticipate d/c to TCU based on medical clearance    Olive Lockhart PA-C

## 2021-01-14 NOTE — PROGRESS NOTES
1008  ADDENDUM: Updated Yonathan at [facility] updating him on SLP recommendation of starting cautiously a DD1 with nectar thick liquids.  SW assisting w/ TCU placement.     Care Management Follow Up    Length of Stay (days): 2    Expected Discharge Date: 01/15/21     Concerns to be Addressed: all concerns addressed in this encounter     Patient plan of care discussed at interdisciplinary rounds: Yes    Anticipated Discharge Disposition: Skilled Nursing Facilty     Anticipated Discharge Services: None  Anticipated Discharge DME: None    Patient/family educated on Medicare website which has current facility and service quality ratings: no  Education Provided on the Discharge Plan:    Patient/Family in Agreement with the Plan: unable to assess    Referrals Placed by CM/SW:    Private pay costs discussed: No    Additional Information:  -Received a phone call from PRASHANTH Mcmanus from [facility], [phone] stating they prefer the pt go to TCU if he is not completely at his baseline. They only have one nursing assistant on his floor for the entire unit. He also stated he spoke to the pt's sister yesterday. She knows the pt is here at [facility] but doesn't want to call us. Yonathan has a good relationship with Cassi and will call her.  Let Yonathan know pt is currently NPO and will have SLP at 9 am to see if he can take PO. Will call Yonathan back and let him know the plan once the pt is seen by SLP.

## 2021-01-15 ENCOUNTER — APPOINTMENT (OUTPATIENT)
Dept: PHYSICAL THERAPY | Facility: CLINIC | Age: 86
DRG: 521 | End: 2021-01-15
Payer: COMMERCIAL

## 2021-01-15 LAB
DEPRECATED CALCIDIOL+CALCIFEROL SERPL-MC: <45 UG/L (ref 20–75)
GLUCOSE SERPL-MCNC: 102 MG/DL (ref 70–99)
HGB BLD-MCNC: 10.4 G/DL (ref 13.3–17.7)
VITAMIN D2 SERPL-MCNC: <5 UG/L
VITAMIN D3 SERPL-MCNC: 40 UG/L

## 2021-01-15 PROCEDURE — 250N000013 HC RX MED GY IP 250 OP 250 PS 637: Performed by: PHYSICIAN ASSISTANT

## 2021-01-15 PROCEDURE — 250N000011 HC RX IP 250 OP 636: Performed by: INTERNAL MEDICINE

## 2021-01-15 PROCEDURE — 120N000001 HC R&B MED SURG/OB

## 2021-01-15 PROCEDURE — 258N000003 HC RX IP 258 OP 636: Performed by: INTERNAL MEDICINE

## 2021-01-15 PROCEDURE — 250N000013 HC RX MED GY IP 250 OP 250 PS 637: Performed by: INTERNAL MEDICINE

## 2021-01-15 PROCEDURE — 36415 COLL VENOUS BLD VENIPUNCTURE: CPT | Performed by: PHYSICIAN ASSISTANT

## 2021-01-15 PROCEDURE — 97110 THERAPEUTIC EXERCISES: CPT | Mod: GP | Performed by: PHYSICAL THERAPY ASSISTANT

## 2021-01-15 PROCEDURE — 97530 THERAPEUTIC ACTIVITIES: CPT | Mod: GP | Performed by: PHYSICAL THERAPY ASSISTANT

## 2021-01-15 PROCEDURE — 85018 HEMOGLOBIN: CPT | Performed by: PHYSICIAN ASSISTANT

## 2021-01-15 PROCEDURE — 99232 SBSQ HOSP IP/OBS MODERATE 35: CPT | Performed by: INTERNAL MEDICINE

## 2021-01-15 PROCEDURE — 82947 ASSAY GLUCOSE BLOOD QUANT: CPT | Performed by: PHYSICIAN ASSISTANT

## 2021-01-15 RX ORDER — OLANZAPINE 10 MG/2ML
5 INJECTION, POWDER, FOR SOLUTION INTRAMUSCULAR 2 TIMES DAILY PRN
Status: DISCONTINUED | OUTPATIENT
Start: 2021-01-15 | End: 2021-01-16 | Stop reason: HOSPADM

## 2021-01-15 RX ADMIN — QUETIAPINE 12.5 MG: 25 TABLET, FILM COATED ORAL at 19:03

## 2021-01-15 RX ADMIN — Medication 50 MCG: at 08:41

## 2021-01-15 RX ADMIN — AMPICILLIN AND SULBACTAM 3 G: 2; 1 INJECTION, POWDER, FOR SOLUTION INTRAMUSCULAR; INTRAVENOUS at 20:37

## 2021-01-15 RX ADMIN — QUETIAPINE 12.5 MG: 25 TABLET, FILM COATED ORAL at 11:06

## 2021-01-15 RX ADMIN — AMPICILLIN AND SULBACTAM 3 G: 2; 1 INJECTION, POWDER, FOR SOLUTION INTRAMUSCULAR; INTRAVENOUS at 13:14

## 2021-01-15 RX ADMIN — MELATONIN TAB 3 MG 3 MG: 3 TAB at 21:16

## 2021-01-15 RX ADMIN — AMPICILLIN AND SULBACTAM 3 G: 2; 1 INJECTION, POWDER, FOR SOLUTION INTRAMUSCULAR; INTRAVENOUS at 01:29

## 2021-01-15 RX ADMIN — SODIUM CHLORIDE 1000 ML: 9 INJECTION, SOLUTION INTRAVENOUS at 08:40

## 2021-01-15 RX ADMIN — ACETAMINOPHEN 975 MG: 325 TABLET, FILM COATED ORAL at 05:26

## 2021-01-15 RX ADMIN — QUETIAPINE 12.5 MG: 25 TABLET, FILM COATED ORAL at 21:15

## 2021-01-15 RX ADMIN — RIVAROXABAN 15 MG: 15 TABLET, FILM COATED ORAL at 08:41

## 2021-01-15 RX ADMIN — OLANZAPINE 5 MG: 10 INJECTION, POWDER, FOR SOLUTION INTRAMUSCULAR at 18:41

## 2021-01-15 RX ADMIN — ACETAMINOPHEN 975 MG: 325 TABLET, FILM COATED ORAL at 13:14

## 2021-01-15 RX ADMIN — AMPICILLIN AND SULBACTAM 3 G: 2; 1 INJECTION, POWDER, FOR SOLUTION INTRAMUSCULAR; INTRAVENOUS at 08:41

## 2021-01-15 RX ADMIN — VENLAFAXINE HYDROCHLORIDE 225 MG: 75 CAPSULE, EXTENDED RELEASE ORAL at 21:16

## 2021-01-15 RX ADMIN — OLANZAPINE 5 MG: 10 INJECTION, POWDER, FOR SOLUTION INTRAMUSCULAR at 06:58

## 2021-01-15 ASSESSMENT — ACTIVITIES OF DAILY LIVING (ADL)
ADLS_ACUITY_SCORE: 28

## 2021-01-15 NOTE — PROGRESS NOTES
[facility]    Medicine Progress Note - Hospitalist Service       Date of Admission:  1/12/2021    Assessment & Plan   Maricarmen Lovell is a very pleasant 86 year old male with Alzheimer's dementia, frequent falls, chronic a-fib on chronic anticoagulation with rivaroxaban, HTN, COPD/emphysema, prior ICH, and CKD who was admitted on 1/12/2021 for an acute left femoral neck fracture following a fall. He is now s/p right hip hemiarthroplasty on 1/12. Hospital course complicated by aspiration pneumonia likely due to chronic dysphagia.     Acute left femoral neck fracture following fall, s/p right hemiarthroplasty on 1/12/21  History of frequent falls  History of Vitamin D deficiency  Fall from standing with resulting left hip pain and inability to ambulate. Radiographic evidence on arrival for left femur fracture. Ortho was consulted on admission, and patient underwent uncomplicated right hip hemiarthroplasty by Dr. Gaines on 1/12.  - Post-op cares as per Ortho:  - WBAT LLE with walker, posterior hip precautions  - On APAP 975 mg TID, oxycodone and Dilaudid PRN for pain  - PT/OT    Right-sided aspiration pneumonia with sepsis, chronic dysphagia suspected, Improving  Fever to 100.2 noted post-op with leukocytosis (14.6) and cough. CXR (1/13) showed evolving right-sided infiltrate, and patient was initiated on IV Unasyn  - Continues on Unasyn, 1/13 to present  - 1/13 blood cultures x2 NGTD  - Initially NPO per SLP, advanced to DDL1 with nectar-thickened liquids evening of 1/14.   - Monitor PO intake. Nutrition consulted for potential supplements  - Unable to review goals of care given advanced dementia. Multiple attempts to reach patient's sister have been unsuccessful    Hypotension  BP 80s systolic on 1/15. Asymptomatic. No s/sx of active bleeding.  - NS bolus 1L over 4 hours ordered  - Will monitor     Chronic atrial fibrillation  - Auto rate-controlled  - Metoprolol 5 mg IV PRN for HR>120  - He  has been cleared by ortho to resume PTA rivaroxaban 15 mg daily    Dyslipidemia  Chronic and stable on atorvastatin    CKD stage III  Creatinine within baseline 1-1.1    Chronic slow transit constipation  - Continues on PTA stool softeners    COPD, emphysema  Chronic and stable. Not on bronchodilators PTA    History ICH  Non-active.    Alzheimer's Dementia with behavioral disturbance  Major recurrent depressive disorder with anxiety  [PTA: venlafaxine 225 mg daily, melatonin 3 mg qhs, Seroquel 12.5 mg qhs, BID prn]   - On delirium protocol   - Continues on PTA meds    Diet: Combination Diet Dysphagia Diet Level 1: Pureed; Nectar Thickened Liquids (pre-thickened or use instant food thickener) , D5NS at 100 ml/h  DVT Prophylaxis: Rivaroxaban  Solorio Catheter: not present  Code Status: No CPR- Do NOT Intubate         Disposition: Expected discharge to TCU pending adequate PO intake on modified diet and stable blood pressure, possibly tomorrow    The patient's care was discussed with the Bedside Nurse, CC, and SW    Di Jin MD  Hospitalist Service  [facility]  Contact information available via [facility] Paging/Directory  ______________________________________________________________________    Interval History   Somewhat agitated overnight. Calm and cooperative this morning. Notified of blood pressures 80s systolic. He denies dizziness/lightheadedness. He denies cp/sob, nausea, or pain.    Data reviewed today: I reviewed all medications, new labs and imaging results over the last 24 hours. I personally reviewed no images or EKGs today.    Physical Exam   Vital Signs: Temp: 98.2  F (36.8  C) Temp src: Axillary BP: 92/50 Pulse: 61   Resp: 16 SpO2: 95 % O2 Device: Nasal cannula Oxygen Delivery: 2 LPM  Weight: 189 lbs 9.6 oz  Constitutional: Resting comfortably, NAD  HEENT: Sclera white, MMM  Respiratory: Breathing non-labored. Lungs CTAB - no wheezes, crackles, or rhonchi  Cardiovascular:  Heart irregular rhythm, normal rate. No pedal edema  GI: +BS, abd soft/NT  Skin/Integument: No rash  Musculoskeletal: Normal muscle bulk and tone  Neuro: Alert, answers 'yes/no' appropriately, but otherwise confused. Does not follow commands consistently. KAT  Psych: Calm and cooperative    Data   Recent Labs   Lab 01/14/21  0826 01/13/21  0523 01/12/21  0151   WBC 9.2 14.6* 9.4   HGB 10.8* 12.4* 14.7   MCV 99 98 98   * 181 211   INR  --   --  1.26*     --  139   POTASSIUM 4.0  --  4.3   CHLORIDE 111*  --  107   CO2 31  --  30   BUN 21  --  28   CR 0.85  --  1.10   ANIONGAP <1*  --  2*   BEATRIZ 8.3*  --  9.1   *  --  102*         No results found for this or any previous visit (from the past 24 hour(s)).    Medications       sodium chloride 0.9%  1,000 mL Intravenous Once     acetaminophen  975 mg Oral Q8H     ampicillin-sulbactam (UNASYN) IV  3 g Intravenous Q6H     atorvastatin  20 mg Oral QPM     melatonin  3 mg Oral At Bedtime     QUEtiapine  12.5 mg Oral QPM     rivaroxaban ANTICOAGULANT  15 mg Oral QAM     sodium chloride (PF)  3 mL Intracatheter Q8H     venlafaxine  225 mg Oral QPM     cholecalciferol  50 mcg Oral Daily

## 2021-01-15 NOTE — PLAN OF CARE
Turn Q2, A&O x 1-2.  Agitated at times, zyprexa and seroquel PRN.  Scheduled tylenol for pain.  BP low this AM, bolus given & improved.  Incontinent of urine.  Plan to TCU when able.

## 2021-01-15 NOTE — CONSULTS
"CLINICAL NUTRITION SERVICES  -  ASSESSMENT NOTE    Recommendations Ordered by Registered Dietitian (RD):   - Start Magic Shake w/ lunch. Available PRN.    Malnutrition: (1/15)   % Weight Loss:  Weight loss does not meet criteria for malnutrition   % Intake:  Decreased intake does not meet criteria for malnutrition   Subcutaneous Fat Loss:  None observed - some age related losses   Muscle Loss:  None observed - some age related losses.   Fluid Retention:  None noted    Malnutrition Diagnosis: Patient does not meet two of the above criteria necessary for diagnosing malnutrition     REASON FOR ASSESSMENT  Maricarmen Lovell is a 86 year old male seen by Registered Dietitian for Provider Order - \"New modified diet due to dysphagia, may need supplements\"    NUTRITION HISTORY  - Information obtained from chart review. Patient seen in room, unable to provide hx.   - H/o Alzheimer's dementia, frequent falls, chronic afib on anticoagulation, HTN, COPD/emphysema, prior ICH, CKD.   - Presented on 1/12 with a left femoral neck fracture after falling.     - Resides in [facility], where meals are likely provided.   - According to MD, current DD1 diet is new.       CURRENT NUTRITION ORDERS  Diet Order:     Dysphagia Diet Level 1 + Nectar Thick Liquids     Current Intake/Tolerance:  Meal review shows that pt ordered 1 meal on 1/12, 2 meals 1/14, and 1 meal this morning.   Patient sitting up in bed, eating with assistance. He is actually eating quite well - had already consumed 100% of the [place] toast, and moved on to the oatmeal.   Maricarmen states appetite is good. States that he likes chocolate shakes.     NUTRITION FOCUSED PHYSICAL ASSESSMENT FOR DIAGNOSING MALNUTRITION)  Yes              Observed:    No nutrition-related physical findings observed -> age related.     Obtained from Chart/Interdisciplinary Team:  Last BM 1/14 - fecal incontinence   Markus - Nutrition 3; Total 14    ANTHROPOMETRICS  Height: 6' 0\"  Weight: 189 lbs 9.6 oz (86 kg) "   Body mass index is 25.71 kg/m .  Weight Status:  Overweight BMI 25-29.9  IBW: 80.9 kg   % IBW: 106%  Weight History: possible wt loss of 11# (5.5%) in 1 months. Not significant.   Wt Readings from Last 10 Encounters:   01/12/21 86 kg (189 lb 9.6 oz)   11/18/20 90.7 kg (200 lb)   10/07/20 83 kg (183 lb)   09/29/20 92.1 kg (203 lb)   09/25/20 92.1 kg (203 lb)   09/22/20 92.1 kg (203 lb 1.6 oz)   09/21/20 92.1 kg (203 lb 1.6 oz)       LABS  Labs reviewed    MEDICATIONS  Medications reviewed  Vitamin D3 2000 units daily     ASSESSED NUTRITION NEEDS PER APPROVED PRACTICE GUIDELINES:  Dosing Weight 86 kg   Estimated Energy Needs: 2207-8461 kcals (20-25 Kcal/Kg)  Justification: maintenance  Estimated Protein Needs: 103-129 grams protein (1.2-1.5 g pro/Kg)  Justification: maintenance  Estimated Fluid Needs: 1 mL/kcal   Justification: maintenance    MALNUTRITION:  % Weight Loss:  Weight loss does not meet criteria for malnutrition   % Intake:  Decreased intake does not meet criteria for malnutrition   Subcutaneous Fat Loss:  None observed - some age related losses   Muscle Loss:  None observed - some age related losses.   Fluid Retention:  None noted    Malnutrition Diagnosis: Patient does not meet two of the above criteria necessary for diagnosing malnutrition    NUTRITION DIAGNOSIS:  Predicted inadequate nutrient intake (protein/energy) related to swallowing difficulty.      NUTRITION INTERVENTIONS  Recommendations / Nutrition Prescription  Diet per SLP  Add magic shakes w/ lunch and PRN.       Implementation  Nutrition education: Provided education on supplements.   Medical Food Supplement: above      Nutrition Goals  Intake of >50-75% of meals TID.       MONITORING AND EVALUATION:  Progress towards goals will be monitored and evaluated per protocol and Practice Guidelines    Venus Dowd RD,   [facility] [place], 66, 55, [facility]   Pager: [phone]  Weekend Pager: [phone]

## 2021-01-15 NOTE — PLAN OF CARE
Pt alert to self only. VSS on 2L NC. CMS intact. Dressing CDI. On DDI nectar thick diet. Poor appetite, IVF infusing. Total care. Incontinence of B&B. Pain managed with schedule tylenol. Will continue to monitor.

## 2021-01-15 NOTE — PROGRESS NOTES
Care Management Follow Up    Length of Stay (days): 3    Expected Discharge Date: 01/16/21     Concerns to be Addressed: all concerns addressed in this encounter     Patient plan of care discussed at interdisciplinary rounds: Yes    Anticipated Discharge Disposition: Skilled Nursing Facilty     Anticipated Discharge Services: None  Anticipated Discharge DME: None    Patient/family educated on Medicare website which has current facility and service quality ratings: no  Education Provided on the Discharge Plan:    Patient/Family in Agreement with the Plan: unable to assess    Referrals Placed by CM/SW:    Private pay costs discussed: Not applicable    Additional Information:  Patient accepted to [facility] for Saturday 1/16/21. Call received from patient's sister confirming that she had received writer's message and is open to setting up TCU. Call placed to [employer] and a ride scheduled for 1/16/21 @ 1430 per Kate's request. [facility] updated.     Will continue to follow      LULU Max

## 2021-01-15 NOTE — PROGRESS NOTES
Orthopedic Surgery  Maricarmen Lovell  1/15/2021  Admit Date:  2021  POD 3 Days Post-Op  S/P Procedure(s):  LEFT HIP CEMENTED HEMIARTHROPLASTY    Patient resting comfortably in bed.    Pain controlled.  Tolerating oral intake.    Denies nausea or vomiting  Denies chest pain or shortness of breath  No events overnight.     Alert and orient to person - demented at baseline  Vital Sign Ranges  Temperature Temp  Av.8  F (36.6  C)  Min: 97.4  F (36.3  C)  Max: 98.2  F (36.8  C)   Blood pressure Systolic (24hrs), Av , Min:82 , Max:121        Diastolic (24hrs), Av, Min:48, Max:65      Pulse Pulse  Av.7  Min: 61  Max: 72   Respirations Resp  Av.8  Min: 16  Max: 18   Pulse oximetry SpO2  Av %  Min: 95 %  Max: 100 %       Dressing is clean, dry, and intact.   Minimal erythema of the surrounding skin.   Bilateral calves are soft, non-tender.  Bilateral lower extremity is NVI.  Sensation intact bilateral lower extremities  5/5 motor with resisted dorsi and plantar flexion bilaterally  +Dp pulse    Labs:  Recent Labs   Lab Test 21  0826 21  0151 20  0737   POTASSIUM 4.0 4.3 3.7     Recent Labs   Lab Test 01/15/21  0942 21  0826 21  0523   HGB 10.4* 10.8* 12.4*     Recent Labs   Lab Test 21  0151 20  0431 20  1511   INR 1.26* 1.24* 1.74*     Recent Labs   Lab Test 21  0826 21  0523 21  0151   * 181 211       A/P  1. Left hip hemiarthroplasty, femoral neck fracture   Continue Xarelto for DVT prophylaxis.     Mobilize with PT/OT    WBAT with walker, posterior hip precautions   Leave dressing intact.     Continue current pain regiment.    2. Disposition   Anticipate d/c to TCU/Memory care based on PT and medical clearance.    Olive Lockhart PA-C

## 2021-01-15 NOTE — PLAN OF CARE
Pt A&Ox1, baseline Alzheimer's, CMS intact, Aquacel dressing C,D,I, IVF, dysphagia 1 diet with nectar thick liquids, poor po, meds crushed in pudding, up with lift to chair, total care, O2@2L/NC, LS crackles bilat, oxycodone/tylenol for pain management, hip abductor pillow in place.

## 2021-01-16 VITALS
TEMPERATURE: 98.3 F | SYSTOLIC BLOOD PRESSURE: 123 MMHG | BODY MASS INDEX: 25.68 KG/M2 | HEART RATE: 68 BPM | HEIGHT: 72 IN | DIASTOLIC BLOOD PRESSURE: 75 MMHG | WEIGHT: 189.6 LBS | OXYGEN SATURATION: 94 % | RESPIRATION RATE: 16 BRPM

## 2021-01-16 PROCEDURE — 250N000013 HC RX MED GY IP 250 OP 250 PS 637: Performed by: INTERNAL MEDICINE

## 2021-01-16 PROCEDURE — 250N000011 HC RX IP 250 OP 636: Performed by: INTERNAL MEDICINE

## 2021-01-16 PROCEDURE — 250N000013 HC RX MED GY IP 250 OP 250 PS 637: Performed by: PHYSICIAN ASSISTANT

## 2021-01-16 PROCEDURE — 99239 HOSP IP/OBS DSCHRG MGMT >30: CPT | Performed by: INTERNAL MEDICINE

## 2021-01-16 RX ADMIN — RIVAROXABAN 15 MG: 15 TABLET, FILM COATED ORAL at 07:59

## 2021-01-16 RX ADMIN — ACETAMINOPHEN 975 MG: 325 TABLET, FILM COATED ORAL at 06:24

## 2021-01-16 RX ADMIN — AMPICILLIN AND SULBACTAM 3 G: 2; 1 INJECTION, POWDER, FOR SOLUTION INTRAMUSCULAR; INTRAVENOUS at 07:58

## 2021-01-16 RX ADMIN — Medication 50 MCG: at 07:58

## 2021-01-16 RX ADMIN — AMPICILLIN AND SULBACTAM 3 G: 2; 1 INJECTION, POWDER, FOR SOLUTION INTRAMUSCULAR; INTRAVENOUS at 02:42

## 2021-01-16 ASSESSMENT — ACTIVITIES OF DAILY LIVING (ADL)
ADLS_ACUITY_SCORE: 27
ADLS_ACUITY_SCORE: 28

## 2021-01-16 NOTE — PROGRESS NOTES
Care Management Discharge Note    Discharge Date: 01/16/21  Expected Time of Departure: stretcher ride via [email or web address] [facility] 1430    Discharge Disposition: Transitional Care    Discharge Services: None    Discharge DME: None    Discharge Transportation: health plan transportation    Private pay costs discussed: Not applicable    PAS Confirmation Code: [id number]  Patient/family educated on Medicare website which has current facility and service quality ratings: no    Education Provided on the Discharge Plan:    Persons Notified of Discharge Plans: yes  Patient/Family in Agreement with the Plan: yes    Handoff Referral Completed: Yes    Additional Information:  Patient accepted to [facility] at 1430. Patient will transport via stretcher with [email or web address] transportation at 1430. PAS completed and faxed.     PAS-RR    D: Per DHS regulation, SW completed and submitted PAS-RR to [place] [employer] via the Senior [employer] Line.  PAS-RR confirmation # is : [id number]    P: Further questions may be directed to [employer] Line at #[phone], option #4 for PAS-RR staff.          LULU Max

## 2021-01-16 NOTE — PROGRESS NOTES
Pt belongings packed up and sent with HE.  Including watch, AL memory care sensor and inhaler. Pt to discharge to [facility] @ 1430.    Left message with Sister to review discharge paperwork.  AVS sent with pt.

## 2021-01-16 NOTE — PLAN OF CARE
Physical Therapy Discharge Summary    Reason for therapy discharge:    Discharged to transitional care facility.    Progress towards therapy goal(s). See goals on Care Plan in [facility] electronic health record for goal details.  Goals partially met.  Barriers to achieving goals:   discharge from facility.    Therapy recommendation(s):    Continued therapy is recommended.  Rationale/Recommendations:  cont PT at TCU to optimize functional recovery.

## 2021-01-16 NOTE — DISCHARGE SUMMARY
[facility]  Hospitalist Discharge Summary      Date of Admission:  1/12/2021  Date of Discharge:  1/16/2021  Discharging Provider: Isabel Hernández MD      Discharge Diagnoses   Acute left femoral neck fracture following a fall status post right hemiarthroplasty 1/12/2021  History of recurrent falls  History of vitamin D deficiency  Right-sided aspiration pneumonitis with sepsis  Chronic dysphagia  Hypertension, resolved  Chronic atrial fibrillation, rate controlled  Dyslipidemia  Alzheimer's dementia with behavioral disturbance  Major recurrent depression with anxiety  History of COPD, emphysema, stable  History of slow transit constipation  History of ICH, 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 Moody Gaines/Yara Teran PAC , at (location with   clinic name or city) [facility] [id number] days  to evaluate after   surgery (outreach or virtual). No follow up labs or test are needed prior   to visit. At this visit x-rays will be taken, sutures/staples removed, and   questions to be answered.  Bring any needed forms with to appointment.  Call for appointment (Nida - Patient Coordinator): [phone]  After hours: [phone]         Follow Up and recommended labs and tests      Follow up with assisted physician.  The following labs/tests are   recommended: cbc.           Unresulted Labs Ordered in the Past 30 Days of this Admission     Date and Time Order Name Status Description    1/13/2021 0421 Blood culture Preliminary     1/13/2021 0421 Blood culture Preliminary           Discharge Disposition   Discharged to short-term care facility  Condition at discharge: Stable  Patient ready to discharge to a skilled nursing facility as soon as possible in order to create capacity for patients related to the COVID-19 pandemic.    Hospital Course    Maricarmen Lovell is a 86 year old male with Alzheimer's dementia, frequent falls, chronic a-fib on chronic anticoagulation with rivaroxaban, HTN, COPD/emphysema, prior ICH, and CKD who was admitted on 1/12/2021 for an acute left femoral neck fracture following a fall. Hospital course complicated by aspiration pneumonia likely due to chronic dysphagia.     Acute left femoral neck fracture following fall, s/p right hemiarthroplasty on 1/12/21  History of frequent falls  History of Vitamin D deficiency  Fall from standing with resulting left hip pain and inability to ambulate. Radiographic evidence on arrival for left femur fracture. Ortho was consulted on admission, and patient underwent uncomplicated right hip hemiarthroplasty by Dr. Gaines on 1/12.  - Post-op cares as per Ortho:  - WBAT LLE with walker, posterior hip precautions  - On APAP 975 mg TID, oxycodone  for pain  - PT/OT followed in-house, recommending TCU  -Outpatient follow-up with Ortho     Right-sided aspiration pneumonia with sepsis, chronic dysphagia suspected, Improving  Fever to 100.2 noted post-op with leukocytosis (14.6) and cough. CXR (1/13) showed evolving right-sided infiltrate, and patient was initiated on IV Unasyn  -Treated with Unasyn while in the hospital, start date 1/13. Discharged on Augmentin to complete 7 days course  - 1/13 blood cultures x2 NGTD  - Initially NPO per SLP, advanced to DDL1 with nectar-thickened liquids evening of 1/14.   -Continue speech evaluation and recommendation at TCU    Hypotension  BP 80s systolic on 1/15. Asymptomatic. No s/sx of active bleeding.  -Blood pressure improved after hydration and was stable on the day of discharge     Chronic atrial fibrillation  - Auto rate-controlled  - PTA rivaroxaban 15 mg daily restarted     Dyslipidemia  Chronic and stable on atorvastatin     CKD stage III  Creatinine within baseline 1-1.1, stable     Chronic slow transit constipation  - Continues on PTA stool  softeners     COPD, emphysema  Chronic and stable. Not on bronchodilators PTA     History ICH  Non-active.     Alzheimer's Dementia with behavioral disturbance  Major recurrent depressive disorder with anxiety  [PTA: venlafaxine 225 mg daily, melatonin 3 mg qhs, Seroquel 12.5 mg qhs, BID prn]   - On delirium protocol   - Continues on PTA meds     Consultations This Hospital Stay   ORTHOPEDIC SURGERY IP CONSULT  CARE MANAGEMENT / SOCIAL WORK IP CONSULT  SOCIAL WORK IP CONSULT  PHYSICAL THERAPY ADULT IP CONSULT  OCCUPATIONAL THERAPY ADULT IP CONSULT  SWALLOW EVAL SPEECH PATH AT BEDSIDE IP CONSULT  PHYSICAL THERAPY ADULT IP CONSULT  OCCUPATIONAL THERAPY ADULT IP CONSULT  NUTRITION SERVICES ADULT IP CONSULT  SPEECH LANGUAGE PATH ADULT IP CONSULT    Code Status   No CPR- Do NOT Intubate    Time Spent on this Encounter   I, Isabel Hernández MD, personally saw the patient today and spent greater than 30 minutes discharging this patient.       Isabel Hernández MD  [place] [address] [facility]  [address] [place] [address]  Phone: [phone]  ______________________________________________________________________    Physical Exam   Vital Signs: Temp: 98.3  F (36.8  C) Temp src: Oral BP: 123/75 Pulse: 68   Resp: 16 SpO2: 94 % O2 Device: None (Room air)    Weight: 189 lbs 9.6 oz  Exam:  Constitutional: Awake, alert and no distress. Appears comfortable. Pleasantly confused  Head: Normocephalic. No masses, lesions, tenderness or abnormalities  ENT: ENT exam normal, no neck nodes or sinus tenderness  Cardiovascular: Irregular but rate controlled, 2+ murmurs, no rubs or JVD  Respiratory: Normal WOB,b/l equal air entry, no wheezes or crackles   Gastrointestinal: Abdomen soft, non-tender. BS normal. No masses, organomegaly  : Deferred  Extremities : No edema , no clubbing or cyanosis. Dressing in place, clean and intact      Primary Care Physician   [facility]    Discharge Orders       General info for SNF    Length of Stay Estimate: Short Term Care: Estimated # of Days <30  Condition at Discharge: Improving  Level of care:skilled   Rehabilitation Potential: Good  Admission H&P remains valid and up-to-date: Yes  Recent Chemotherapy: N/A  Use Nursing Home Standing Orders: Yes     Mantoux instructions    Give two-step Mantoux (PPD) Per Facility Policy Yes     Reason for your hospital stay    Left femoral neck fracture, left hip hemiarthroplasty     Weight bearing status    WBAT with walker     Activity - Up with assistive device     Follow Up and recommended labs and tests    Follow up with Dr. Moody Gaines/Yara Teran PAC , at (location with clinic name or city) [facility] [id number] days  to evaluate after surgery (outreach or virtual). No follow up labs or test are needed prior to visit. At this visit x-rays will be taken, sutures/staples removed, and questions to be answered.  Bring any needed forms with to appointment.  Call for appointment (Nida - Patient Coordinator): [phone]  After hours: [phone]     Wound care (specify)    Site:   Left hip  Instructions:  Leave dressing intactremove at POD #7-10, remove staples POD #14  Daily dressing changes if gauze and tape     Follow Up and recommended labs and tests    Follow up with [facility] physician.  The following labs/tests are recommended: cbc.     Physical Therapy Adult Consult    Evaluate and treat as clinically indicated.    Reason:  left hip hemiarthroplasty     Occupational Therapy Adult Consult    Evaluate and treat as clinically indicated.    Reason:  left hip hemiarthroplasty     Speech Language Path Adult Consult    Evaluate and treat as clinically indicated.    Reason:  Aspiration     Fall precautions     Hip precautions     Walker DME    : DME Documentation: Describe the reason for need to support medical necessity: Impaired gait status post hip surgery. I, the undersigned, certify that the above prescribed  supplies are medically necessary for this patient and is both reasonable and necessary in reference to accepted standards of medical practice in the treatment of this patient's condition and is not prescribed as a convenience.     Advance Diet as Tolerated    Follow this diet upon discharge: Orders Placed This Encounter      Snacks/Supplements Adult: Other; Magic Shake; With Meals      Combination Diet Dysphagia Diet Level 1: Pureed; Nectar Thickened Liquids (pre-thickened or use instant food thickener)       Significant Results and Procedures   Results for orders placed or performed during the hospital encounter of 01/12/21   XR Pelvis and Hip Left 1 View    Narrative    EXAM: XR PELVIS AND HIP LEFT 1 VIEW  LOCATION: [facility]  DATE/TIME: 1/12/2021 2:14 AM    INDICATION: Pain after fall.  COMPARISON: None.      Impression    IMPRESSION: Acute impacted and varus angulated fracture of the left femoral neck. No dislocation.   Head CT w/o contrast    Narrative    EXAM: CT HEAD W/O CONTRAST  LOCATION: [facility]  DATE/TIME: 1/12/2021 2:17 AM    INDICATION: Traumatic injury. Anticoagulated.  COMPARISON: Brain MRI dated 09/07/2020  TECHNIQUE: Routine CT Head without IV contrast. Multiplanar reformats. Dose reduction techniques were used.    FINDINGS:  INTRACRANIAL CONTENTS: No intracranial hemorrhage, extraaxial collection, or mass effect.  No CT evidence of acute infarct. Moderate presumed chronic small vessel ischemic changes. Moderate generalized volume loss. No hydrocephalus. Redemonstrated small   right paraclinoid meningioma, which appears hyperdense on the undersurface of the right frontal lobe. Scattered atherosclerotic calcifications.    VISUALIZED ORBITS/SINUSES/MASTOIDS: Prior bilateral cataract surgery. Visualized portions of the orbits are otherwise unremarkable. No paranasal sinus mucosal disease. No middle ear or mastoid effusion.    BONES/SOFT TISSUES: No acute abnormality.       Impression    IMPRESSION:  1.  No CT evidence for acute intracranial process.  2.  Brain atrophy and presumed chronic microvascular ischemic changes as above.   CT Cervical Spine w/o Contrast    Narrative    EXAM: CT CERVICAL SPINE W/O CONTRAST  LOCATION: [facility]  DATE/TIME: 1/12/2021 2:17 AM    INDICATION: Traumatic injury  COMPARISON: None.  TECHNIQUE: Routine CT Cervical Spine without IV contrast. Multiplanar reformats. Dose reduction techniques were used.    FINDINGS:  VERTEBRA: Normal vertebral body heights and alignment. No fracture or posttraumatic subluxation.     CANAL/FORAMINA: Multilevel degenerative changes of the cervical spine with at least mild canal stenosis at C5-C6. Multilevel bilateral neural foraminal narrowing is greatest at C5-C6 on the left where there is moderate to severe stenosis.    PARASPINAL: No acute extraspinal abnormality.      Impression    IMPRESSION:  1.  Degenerative changes of the cervical spine without evidence of an acute displaced fracture.   XR Chest 1 View    Narrative    EXAM: XR CHEST 1 VW  LOCATION: [facility]  DATE/TIME: 1/12/2021 2:20 AM    INDICATION: Pain after fall. Preoperative evaluation. Hip fracture.  COMPARISON: None.      Impression    IMPRESSION: Lungs appear clear. Mild elevation of the left hemidiaphragm. No pleural fluid or pneumothorax. Normal heart size and pulmonary vascularity.   XR Pelvis w Hip Port Left 1 View    Narrative    PELVIS AND HIP PORTABLE LEFT ONE VIEW   1/12/2021 4:23 PM     HISTORY:  Status post hip surgery    COMPARISON: 1/12/2021.      Impression    IMPRESSION: There is a new bipolar left hip hemiarthroplasty.  Excellent position/alignment. There is no evidence of hardware  complication or fracture. Significant motion on the lateral view, but  no gross abnormalities.    JAYANT MORA MD   XR Chest Port 1 View    Narrative    CHEST PORTABLE ONE VIEW  1/13/2021 6:04 AM     HISTORY: Fever,  cough.    COMPARISON: Chest x-ray 1/12/2021.      Impression    IMPRESSION: Portable chest. Airspace opacities right infrahilar region  are suspected. Left lung is clear. Heart is normal in size. No  pneumothorax. No definite pleural effusions.    MARCIE DEVINE MD       Discharge Medications   Current Discharge Medication List      START taking these medications    Details   amoxicillin-clavulanate (AUGMENTIN) 875-125 MG tablet Take 1 tablet by mouth 2 times daily for 4 days  Qty: 8 tablet, Refills: 0    Associated Diagnoses: Aspiration pneumonitis (H)      ondansetron (ZOFRAN-ODT) 4 MG ODT tab Take 1 tablet (4 mg) by mouth every 6 hours as needed for nausea or vomiting  Qty: 5 tablet, Refills: 0    Associated Diagnoses: Hip fracture, left, closed, initial encounter (H)      oxyCODONE (ROXICODONE) 5 MG tablet Take 1 tablet (5 mg) by mouth every 4 hours as needed for severe pain ((pain rating 7-10))  Qty: 35 tablet, Refills: 0    Associated Diagnoses: Hip fracture, left, closed, initial encounter (H)         CONTINUE these medications which have NOT CHANGED    Details   acetaminophen (TYLENOL) 325 MG tablet Take 650 mg by mouth every 4 hours as needed for mild pain      atorvastatin (LIPITOR) 20 MG tablet Take 1 tablet (20 mg) by mouth every evening    Associated Diagnoses: Dyslipidemia      hypromellose (ARTIFICIAL TEARS) 0.5 % SOLN ophthalmic solution Place 2 drops into both eyes every 6 hours as needed for dry eyes       melatonin 3 MG tablet Take 3 mg by mouth At Bedtime       polyethylene glycol (MIRALAX) 17 g packet Take 1 packet by mouth daily as needed for constipation      psyllium (METAMUCIL/KONSYL) Packet Take 1 packet by mouth daily as needed for constipation      !! QUEtiapine (SEROQUEL) 25 MG tablet Take 12.5 mg by mouth every evening      !! QUEtiapine (SEROQUEL) 25 MG tablet Take 12.5 mg by mouth 2 times daily as needed      rivaroxaban ANTICOAGULANT (XARELTO) 15 MG TABS tablet Take 15 mg by mouth every  morning      STIMULANT LAXATIVE 8.6-50 MG tablet TAKE ONE TABLET BY MOUTH TWICE A DAY FOR CONSTIPATION  Qty: 100 tablet, Refills: 0    Associated Diagnoses: Slow transit constipation      venlafaxine (EFFEXOR-ER) 225 MG 24 hr tablet Take 225 mg by mouth every evening       vitamin D3 (CHOLECALCIFEROL) 50 mcg (2000 units) tablet Take 50 mcg by mouth every morning        !! - Potential duplicate medications found. Please discuss with provider.        Allergies   Allergies   Allergen Reactions     Pecan [Nuts] Anaphylaxis     Fluoxetine Nausea     Juglans

## 2021-01-16 NOTE — PLAN OF CARE
"Speech Language Therapy Discharge Summary    Reason for therapy discharge:    Discharged to transitional care facility.    Progress towards therapy goal(s). See goals on Care Plan in [facility] electronic health record for goal details.  Goals not met.  Barriers to achieving goals:   discharge from facility.    Therapy recommendation(s):    Continued therapy is recommended.  Rationale/Recommendations:  Patient is below baseline for function, will likely require ongoing therapy to ensure safe return to PLOF. At time of discharge SLP recommendations were \"Cautiously start dysphagia diet level 1 and nectar thick liquids with 1:1 assist when pt is alert and as upright as possible\"    "

## 2021-01-16 NOTE — PLAN OF CARE
Service: Ortho  Behavior/Aggression tool color: Green  Diagnosis: L hip jay  POD#: 4  Mental Status: self only  Activity/dangle: A2 lift. WBAT  Diet: reg  Pain: Iv dilaudid available  Solorio/Voiding: incont  Tele/Restraints/Iso: na  02/LDA: RA  D/C Date: tbd  Other Info: Pt combative w/ cares at times.

## 2021-01-16 NOTE — PROGRESS NOTES
Orthopedic Surgery  Maricarmen Lovell  2021  Admit Date:  2021  POD 4 Days Post-Op  S/P Procedure(s):  LEFT HIP CEMENTED HEMIARTHROPLASTY    Patient resting comfortably in bed.    Pain controlled.  Tolerating oral intake.    Denies nausea or vomiting  Denies chest pain or shortness of breath  No events overnight.     Baseline advanced dementia  Vital Sign Ranges  Temperature Temp  Av.4  F (36.9  C)  Min: 98.3  F (36.8  C)  Max: 98.4  F (36.9  C)   Blood pressure Systolic (24hrs), Av , Min:105 , Max:131        Diastolic (24hrs), Av, Min:53, Max:75      Pulse Pulse  Av.8  Min: 60  Max: 92   Respirations Resp  Av.5  Min: 16  Max: 17   Pulse oximetry SpO2  Av %  Min: 94 %  Max: 96 %       Dressing is clean, dry, and intact.   Minimal erythema of the surrounding skin.   Bilateral calves are soft, non-tender.  Bilateral lower extremity is NVI.  Sensation intact bilateral lower extremities  5/5 motor with resisted dorsi and plantar flexion bilaterally  +Dp pulse    Labs:  Recent Labs   Lab Test 21  0826 21  0151 20  0737   POTASSIUM 4.0 4.3 3.7     Recent Labs   Lab Test 01/15/21  0942 21  0826 21  0523   HGB 10.4* 10.8* 12.4*     Recent Labs   Lab Test 21  0151 20  0431 20  1511   INR 1.26* 1.24* 1.74*     Recent Labs   Lab Test 21  0826 21  0523 21  0151   * 181 211       A/P  1. Left hip hemiarthroplasty s/t femoral neck fracture   Continue Xarelto for DVT prophylaxis.     Mobilize with PT/OT    WBAT with walker, posterior hip precautions   Leave dressings intact.     Continue current pain regiment.    2. Disposition   Anticipate d/c to TCU/[facility] on PT and medical clearance.    Olive Lockhart PA-C

## 2021-01-16 NOTE — PLAN OF CARE
Pt is AOx1, only to self, inconsistently following commands, agitated and aggressive at times, zyprexa Im seroquel given, agreed to take some meds crushed w/ apple sauce, CMS intact, dressing removed multiple times by patient, new dressing applied. Aspiration precaution maintained. Difficult to maintain hip precaution with patient's mentation and aggression. Pending potential discharge if BP stable tomorrow.

## 2021-01-17 VITALS
DIASTOLIC BLOOD PRESSURE: 69 MMHG | RESPIRATION RATE: 16 BRPM | OXYGEN SATURATION: 93 % | HEART RATE: 92 BPM | BODY MASS INDEX: 26.77 KG/M2 | SYSTOLIC BLOOD PRESSURE: 105 MMHG | WEIGHT: 197.4 LBS | TEMPERATURE: 97 F

## 2021-01-18 ENCOUNTER — NURSING HOME VISIT (OUTPATIENT)
Dept: GERIATRICS | Facility: CLINIC | Age: 86
End: 2021-01-18
Payer: COMMERCIAL

## 2021-01-18 DIAGNOSIS — S72.002A HIP FRACTURE, LEFT, CLOSED, INITIAL ENCOUNTER (H): ICD-10-CM

## 2021-01-18 DIAGNOSIS — R52 PAIN: ICD-10-CM

## 2021-01-18 DIAGNOSIS — R13.10 DYSPHAGIA, UNSPECIFIED TYPE: ICD-10-CM

## 2021-01-18 DIAGNOSIS — N18.30 STAGE 3 CHRONIC KIDNEY DISEASE, UNSPECIFIED WHETHER STAGE 3A OR 3B CKD (H): ICD-10-CM

## 2021-01-18 DIAGNOSIS — G30.1 LATE ONSET ALZHEIMER'S DISEASE WITHOUT BEHAVIORAL DISTURBANCE (H): ICD-10-CM

## 2021-01-18 DIAGNOSIS — F02.80 LATE ONSET ALZHEIMER'S DISEASE WITHOUT BEHAVIORAL DISTURBANCE (H): ICD-10-CM

## 2021-01-18 DIAGNOSIS — E78.5 HYPERLIPIDEMIA, UNSPECIFIED HYPERLIPIDEMIA TYPE: ICD-10-CM

## 2021-01-18 DIAGNOSIS — K59.01 SLOW TRANSIT CONSTIPATION: Primary | ICD-10-CM

## 2021-01-18 DIAGNOSIS — I48.20 CHRONIC A-FIB (H): ICD-10-CM

## 2021-01-18 PROCEDURE — 99310 SBSQ NF CARE HIGH MDM 45: CPT | Performed by: NURSE PRACTITIONER

## 2021-01-18 RX ORDER — OXYCODONE HYDROCHLORIDE 5 MG/1
2.5 TABLET ORAL EVERY 4 HOURS PRN
COMMUNITY
End: 2021-01-19

## 2021-01-18 RX ORDER — ACETAMINOPHEN 500 MG
1000 TABLET ORAL 3 TIMES DAILY
COMMUNITY

## 2021-01-18 NOTE — PROGRESS NOTES
[place] [facility]  PRIMARY CARE PROVIDER AND CLINIC:  [facility], [address] [place], [place] [address]  Chief Complaint   Patient presents with     Hospital F/U     [place] Medical Record Number:  [id number]  Place of Service where encounter took place:  [facility] [[id number]]    Maricarmen Lovell  is a 86 year old  (4/8/1934), admitted to the above facility from  [facility]. Hospital stay 1/12/21 through 1/16/21..  Admitted to this facility for  rehab, medical management and nursing care.    HPI:    HPI information obtained from: facility chart records, facility staff, patient report and [facility] chart review.     Brief Summary of Hospital Course:  He was admitted on 1/12/2021 for an acute left femoral neck fracture following a mechanical fall. Hospital course complicated by aspiration pneumonia likely due to chronic dysphagia.  He underwent an uncomplicated right hip hemiarthroplasty by Dr Gaines on 1/12/21. He was given WBAT, tylenol and oxycodone per pain control and restarted on his PTA xarelto per A-fib. He also had ABLA, with a discharge Hgb of 10.4. As mentioned prior, he was found to have right sided aspiration PNA, suspect chronic dysphagia. He was initiated on unasyn ans discharged on augmentin for a 7d course. He was also initiated on a modified diet. Of note developed hypotension, received IVF as well, resolved. He was then discharged to the TCU for rehab.    Updates on Status Since Skilled nursing Admission:    CODE STATUS/ADVANCE DIRECTIVES DISCUSSION:   DNR / DNI  Patient's living condition: lives in an assisted living facility  ALLERGIES: Pecan [nuts], Fluoxetine, and Juglans  PAST MEDICAL HISTORY:  has a past medical history of Alzheimer disease (H), Chronic a-fib (H), CKD (chronic kidney disease) stage 3, GFR 30-59 ml/min, COPD (chronic obstructive pulmonary disease) (H), Depression, Frequent falls, and HTN  (hypertension).  PAST SURGICAL HISTORY:   has a past surgical history that includes Open reduction internal fixation hip bipolar (Left, 1/12/2021).  FAMILY HISTORY: family history is not on file.  SOCIAL HISTORY:   reports that he has quit smoking. He has never used smokeless tobacco. He reports that he does not drink alcohol or use drugs.    Post Discharge Medication Reconciliation Status: discharge medications reconciled and changed, per note/orders    Current Outpatient Medications   Medication Sig Dispense Refill     acetaminophen (TYLENOL) 500 MG tablet Take 1,000 mg by mouth 3 times daily And 1000mg daily PRN, max 4gm/day       oxyCODONE (ROXICODONE) 5 MG tablet Take 2.5 mg by mouth every 4 hours as needed for severe pain       amoxicillin-clavulanate (AUGMENTIN) 875-125 MG tablet Take 1 tablet by mouth 2 times daily for 4 days (Patient taking differently: Take 1 tablet by mouth 2 times daily Complete on 1/20) 8 tablet 0     atorvastatin (LIPITOR) 20 MG tablet Take 1 tablet (20 mg) by mouth every evening       hypromellose (ARTIFICIAL TEARS) 0.5 % SOLN ophthalmic solution Place 2 drops into both eyes every 6 hours as needed for dry eyes        melatonin 3 MG tablet Take 3 mg by mouth At Bedtime        ondansetron (ZOFRAN-ODT) 4 MG ODT tab Take 1 tablet (4 mg) by mouth every 6 hours as needed for nausea or vomiting 5 tablet 0     polyethylene glycol (MIRALAX) 17 g packet Take 1 packet by mouth daily        psyllium (METAMUCIL/KONSYL) Packet Take 1 packet by mouth daily as needed for constipation       QUEtiapine (SEROQUEL) 25 MG tablet Take 12.5 mg by mouth every evening       QUEtiapine (SEROQUEL) 25 MG tablet Take 12.5 mg by mouth 2 times daily as needed       rivaroxaban ANTICOAGULANT (XARELTO) 15 MG TABS tablet Take 15 mg by mouth every morning       STIMULANT LAXATIVE 8.6-50 MG tablet TAKE ONE TABLET BY MOUTH TWICE A DAY FOR CONSTIPATION (Patient taking differently: Take 1 tablet by mouth 2 times daily )  100 tablet 0     venlafaxine (EFFEXOR-ER) 225 MG 24 hr tablet Take 225 mg by mouth every evening        vitamin D3 (CHOLECALCIFEROL) 50 mcg (2000 units) tablet Take 50 mcg by mouth every morning          ROS:  10 point ROS of systems including Constitutional, Eyes, Respiratory, Cardiovascular, Gastroenterology, Genitourinary, Integumentary, Musculoskeletal, Psychiatric were all negative except for pertinent positives noted in my HPI.    Vitals:  /69   Pulse 92   Temp 97  F (36.1  C)   Resp 16   Wt 89.5 kg (197 lb 6.4 oz)   SpO2 93%   BMI 26.77 kg/m    Exam:  GENERAL APPEARANCE:  Alert, oriented, cooperative  ENT:  Mouth and posterior oropharynx normal, moist mucous membranes, normal hearing acuity  NECK:  No adenopathy,masses or thyromegaly  RESP:  respiratory effort and palpation of chest normal, no respiratory distress, diminished breath sounds bilaterally, RA  CV:  Palpation and auscultation of heart done , no edema, +2 pedal pulses, irregular rhythm ,auto rate controlled  ABDOMEN:  normal bowel sounds, soft, nontender, no hepatosplenomegaly or other masses, constipated  M/S:   Gait and station abnormal , working with therapy, WBAT  SKIN:  Aquacel drsg intact on L LE  NEURO:   Cranial nerves 2-12 are normal tested and grossly at patient's baseline  PSYCH:  oriented X 3, cognitive testing pending    Lab/Diagnostic data:  Recent labs in [facility] reviewed by me today.     ASSESSMENT/PLAN:  Slow transit constipation  Change miralax to daily    Chronic a-fib (H)  Continue xarelto 15mg daily    Hip fracture, left, closed, initial encounter (H)  WBAT, f/u with ortho as ordered, drsg intact    Stage 3 chronic kidney disease, unspecified whether stage 3a or 3b CKD  Last Cr 0.85 with GFR 78, recheck BMP    Dysphagia, unspecified type. Aspiration PNA  Continue pureed diet with nectar thick, speech consult ordered. Continue augmentin 875mg BID until 1/20    Pain  Increase tylenol to 1000mg TID and daily PRN, decrease  oxycodone to 2.5mg q4h PRN per hx of dementia, encourage icing    Late onset Alzheimer's disease without behavioral disturbance (H)  Continue seroquel 12.5mg at HS, has seroquel 25mg BID PRN and continue melatonin 3mg at HS. No behaviors reported    Normocytic anemia  Last Hgb 10.4 on 1/15/21, recheck    Hyperlipidemia, unspecified hyperlipidemia type  Continue statin    Depression  Continue effexor 225mg daily    Vit D deficiency  Continue D3 2000U daily    Disposition  TBD. Pending cognitive assesment     Orders written by provider at facility  Increase tylenol, decrease oxycodone, icing, labs     Total time spent with patient visit at the skilled nursing facility was 36 including patient visit and review of past records. Greater than 50% of total time spent with counseling and coordinating care due to pain control, L hip, dysphagia and therapy, which lasted 19 minutes.     Electronically signed by:  Ramirez Zhao NP

## 2021-01-18 NOTE — PROGRESS NOTES
[place] [facility]  INITIAL VISIT NOTE  January 19, 2021    PRIMARY CARE PROVIDER AND CLINIC:  Services, [facility] [address], [place] [address] / [facility] [id number]    CHIEF COMPLAINT:  Hospital follow-up/Initial visit    HPI:    Maricarmen Lovell is a 86 year old  (4/8/1934) male who was seen at [facility] on January 19, 2021 for an initial visit.     Medical history is notable for Alzheimer's dementia, hypertension, dyslipidemia, permanent atrial fibrillation, CKD stage III, CVA, depression, emphysema, and impaired fasting glucose.    Summary of hospital course:  Patient was hospitalized at [facility] from January 12 through January 16, 2021 after presenting for evaluation of left hip pain subsequent to a fall.  Test for COVID-19 was negative.  CT head showed no acute intracranial process.  CT cervical spine was negative for fracture or displacement.  X-ray of left hip demonstrated acute impacted and varus angulated fracture of the left femoral neck.  Chest x-ray was unremarkable except for mild elevation of the left hemidiaphragm.  EKG showed atrial fibrillation with a heart rate of 80 bpm.  Patient was evaluated by orthopedic service and underwent left hip hemiarthroplasty on January 12, 2021.  Postop hemoglobin dropped to 10.4 due to blood loss of surgery. Patient was started on aspirin for DVT prophylaxis.   Notably total vitamin D was less than 45.  Hospital course was complicated by right-sided aspiration pneumonia for which he was started on intravenous Unasyn with subsequent transition to Augmentin.  Blood cultures did not yield any growth.  SLP evaluation was performed and patient was started on dysphagia diet level 1 with nectar consistency.  TCU was recommended by therapies.    Patient is admitted to this facility for medical management, nursing care, and rehab.     Of note, history obtained from patient, facility RN, and extensive review of the chart  necessitated by complex hospitalization.    Today's visit:  Patient was seen in his room, while lying in bed.  He appears weak and frail.  HE Complains of left-sided hip pain at 8/10 in severity, although he was able to sleep comfortably overnight with no interruption due to pain.  He complains of occasional cough productive of whitish sputum.  He denies fever, chills, chest pain, palpitation, nausea, vomiting, abdominal pain, or urinary symptoms.  He feels like that he is going to have a bowel movement today.    CODE STATUS:   DNR / DNI    PAST MEDICAL HISTORY:   Alzheimer's dementia; SLUMS 21/30 in 09/2020  Cerebral amyloid angiopathy  Hypertension  Dyslipidemia  Permanent atrial fibrillation, on Xarelto   CKD stage IIIa, baseline creatinine 1-1.1  CVA  Pulmonary emphysema  Depression  Prediabetes  Skin cancer on nose  Meningioma  Aspiration pneumonia in January 2021  Dysphagia  Vitamin D deficiency  Fall with intraparenchymal hemorrhage in the right basal ganglia region, in September 2020   Fall in January 2021 resulting in left femoral neck fracture    Past Medical History:   Diagnosis Date     Alzheimer disease (H)      Chronic a-fib (H)      CKD (chronic kidney disease) stage 3, GFR 30-59 ml/min      COPD (chronic obstructive pulmonary disease) (H)      Depression      Frequent falls      HTN (hypertension)        PAST SURGICAL HISTORY:   Past Surgical History:   Procedure Laterality Date     OPEN REDUCTION INTERNAL FIXATION HIP BIPOLAR Left 1/12/2021    Procedure: LEFT HIP CEMENTED HEMIARTHROPLASTY;  Surgeon: Moody Gaines MD;  Location: [facility] OR       FAMILY HISTORY:   Significant for depression in his mother.    SOCIAL HISTORY:  Patient is  and resides in an assisted living apartment ([facility] He uses a walker for ambulation.    Social History     Tobacco Use     Smoking status: Former Smoker     Smokeless tobacco: Never Used   Substance Use Topics     Alcohol use: Never     Frequency:  Never       MEDICATIONS:  Current Outpatient Medications   Medication Sig Dispense Refill     acetaminophen (TYLENOL) 500 MG tablet Take 1,000 mg by mouth 3 times daily And 1000mg daily PRN, max 4gm/day       amoxicillin-clavulanate (AUGMENTIN) 875-125 MG tablet Take 1 tablet by mouth 2 times daily for 4 days (Patient taking differently: Take 1 tablet by mouth 2 times daily Complete on 1/20) 8 tablet 0     atorvastatin (LIPITOR) 20 MG tablet Take 1 tablet (20 mg) by mouth every evening       hypromellose (ARTIFICIAL TEARS) 0.5 % SOLN ophthalmic solution Place 2 drops into both eyes every 6 hours as needed for dry eyes        melatonin 3 MG tablet Take 3 mg by mouth At Bedtime        ondansetron (ZOFRAN-ODT) 4 MG ODT tab Take 1 tablet (4 mg) by mouth every 6 hours as needed for nausea or vomiting 5 tablet 0     oxyCODONE (ROXICODONE) 5 MG tablet Take 2.5 mg by mouth every 4 hours as needed for severe pain       polyethylene glycol (MIRALAX) 17 g packet Take 1 packet by mouth daily        psyllium (METAMUCIL/KONSYL) Packet Take 1 packet by mouth daily as needed for constipation       QUEtiapine (SEROQUEL) 25 MG tablet Take 12.5 mg by mouth every evening       QUEtiapine (SEROQUEL) 25 MG tablet Take 12.5 mg by mouth 2 times daily as needed       rivaroxaban ANTICOAGULANT (XARELTO) 15 MG TABS tablet Take 15 mg by mouth every morning       STIMULANT LAXATIVE 8.6-50 MG tablet TAKE ONE TABLET BY MOUTH TWICE A DAY FOR CONSTIPATION (Patient taking differently: Take 1 tablet by mouth 2 times daily ) 100 tablet 0     venlafaxine (EFFEXOR-ER) 225 MG 24 hr tablet Take 225 mg by mouth every evening        vitamin D3 (CHOLECALCIFEROL) 50 mcg (2000 units) tablet Take 50 mcg by mouth every morning          ALLERGIES:  Allergies   Allergen Reactions     Pecan [Nuts] Anaphylaxis     Fluoxetine Nausea     Juglans        ROS:  10 point ROS were negative other than the symptoms noted above in the HPI.    PHYSICAL EXAM:  Vital signs were  reviewed in the chart.  Vital Signs: Blood pressure 146/84, heart rate 78, respiratory rate 16, temperature 97.7  F, oxygen saturation 96%, weight 197.4 LBS  General: Frail and weak appearing but in no acute distress  HEENT: Normocephalic; oropharynx clear; mild conjunctival pallor  Cardiovascular: Normal S1, S2, irregularly irregular rhythm, normal rate  Respiratory: Lungs clear to auscultation bilaterally  GI: Abdomen soft, non-tender, non-distended, +BS  Extremities: 1+ bilateral LE edema  Neuro: CX II-XII grossly intact; ROM in all four extremities grossly intact  Psych: Alert and oriented x2-3; normal affect  Skin: No acute rash    LABORATORY/IMAGING DATA:    Most Recent 3 CBC's:  Recent Labs   Lab Test 01/15/21  0942 01/14/21  0826 01/13/21  0523 01/12/21  0151   WBC  --  9.2 14.6* 9.4   HGB 10.4* 10.8* 12.4* 14.7   MCV  --  99 98 98   PLT  --  129* 181 211     Most Recent 3 BMP's:  Recent Labs   Lab Test 01/15/21  0942 01/14/21  0826 01/12/21  0151 09/20/20  0737   NA  --  142 139 139   POTASSIUM  --  4.0 4.3 3.7   CHLORIDE  --  111* 107 107   CO2  --  31 30 27   BUN  --  21 28 23   CR  --  0.85 1.10 0.97   ANIONGAP  --  <1* 2* 5   BEATRIZ  --  8.3* 9.1 8.5   * 155* 102* 106*     Most Recent 2 LFT's:No lab results found.  Most Recent Cholesterol Panel:  Recent Labs   Lab Test 09/18/20  1057   CHOL 180   *   HDL 40   TRIG 74     Most Recent 6 Bacteria Isolates From Any Culture (See EPIC Reports for Culture Details):  Recent Labs   Lab Test 01/13/21  0522 09/16/20  1642   CULT No growth after 5 days  No growth after 5 days <10,000 colonies/mL  urogenital david  Susceptibility testing not routinely done       Most Recent TSH and T4:No lab results found.  Most Recent Hemoglobin A1c:No lab results found.    ASSESSMENT/PLAN:  Fall, subsequent encounter,  Left femoral neck fracture, status post left hemiarthroplasty on January 12, 2021,  Physical deconditioning.  Patient is hemodynamic stable.  Patient  complains of left hip surgical pain.  Plan:  Fall precautions  WBAT LLE with walker  Pain management with PRN acetaminophen and oxycodone  DVT prophylaxis with Xarelto  PT/OT evaluation and therapy  Follow up with orthopedics in 14 days as instructed    Dysphagia,  Right-sided aspiration pneumonia.  Patient is stable from a respiratory standpoint.  Plan:  Continue Augmentin 875 mg p.o. twice daily through January 20, 2021 as per orders  Continue dysphagia level 1 with nectar consistency  Continue SLP evaluation and therapy    Acute blood loss anemia.  Due to above surgery.  Hemoglobin postop dropped from initial 14.7 to 10.4 on January 15.  Plan:  Recheck hemoglobin is scheduled for January 20    Permanent atrial fibrillation.  Heart rate is controlled.  Patient is not on any rate control agent.  Plan:  Continue anticoagulation therapy with Xarelto 15 mg p.o. daily  Monitor heart rate     Hypertension.  By history. Blood pressure is well controlled.  He is not on any blood pressure medication.  Plan:  Monitor blood pressure     CKD stage IIIa.  Baseline creatinine 1-1.1.  Most recent creatinine was stable at 0.85 on January 14, 2021.  Plan:  Avoid NSAIDs and nephrotoxins  Monitor renal function periodically     Pulmonary emphysema.  Asymptomatic.  Plan:  Monitor respiratory status     Dyslipidemia.  Last fasting lipid profile on September 18 showed total cholesterol of 180, LDL of 125, and HDL of 40.  Plan:  Continue atorvastatin 20 mg p.o. at bedtime     Depression.  Chronic and stable.  Plan:  Continue PTA venlafaxine  mg p.o. daily     Prediabetes.  Last Hgb A1c was 5.8% in August 2016.  Blood glucose levels are in the range of 102-155  Plan:  Follow-up with PCP for optimal management  Monitor blood glucose PRN    Vitamin D deficiency.  Plan:  Continue vitamin D 2000 units p.o. daily     Alzheimer's dementia, late onset.  SLUMS 21/30 in September 2020.  Plan:  Continue Seroquel 12.5 mg p.o. every evening as  well as 12.5 mg p.o. twice daily as needed for agitation  Cognitive evaluation by OT  Staff to assist with cares         Orders written by provider at facility:  None.          Electronically signed by:  Erick Fernando MD

## 2021-01-19 ENCOUNTER — HOSPITAL LABORATORY (OUTPATIENT)
Dept: OTHER | Facility: CLINIC | Age: 86
End: 2021-01-19

## 2021-01-19 ENCOUNTER — NURSING HOME VISIT (OUTPATIENT)
Dept: GERIATRICS | Facility: CLINIC | Age: 86
End: 2021-01-19
Payer: COMMERCIAL

## 2021-01-19 VITALS
TEMPERATURE: 96.9 F | SYSTOLIC BLOOD PRESSURE: 107 MMHG | DIASTOLIC BLOOD PRESSURE: 67 MMHG | HEART RATE: 72 BPM | OXYGEN SATURATION: 95 % | WEIGHT: 164 LBS | BODY MASS INDEX: 22.21 KG/M2 | RESPIRATION RATE: 20 BRPM | HEIGHT: 72 IN

## 2021-01-19 DIAGNOSIS — D62 ANEMIA DUE TO BLOOD LOSS, ACUTE: ICD-10-CM

## 2021-01-19 DIAGNOSIS — N18.31 STAGE 3A CHRONIC KIDNEY DISEASE (H): ICD-10-CM

## 2021-01-19 DIAGNOSIS — I10 ESSENTIAL HYPERTENSION: ICD-10-CM
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
## 2021-01-19 DIAGNOSIS — J43.9 PULMONARY EMPHYSEMA, UNSPECIFIED EMPHYSEMA TYPE (H): ICD-10-CM

## 2021-01-19 DIAGNOSIS — J69.0 ASPIRATION PNEUMONIA OF RIGHT LUNG, UNSPECIFIED ASPIRATION PNEUMONIA TYPE, UNSPECIFIED PART OF LUNG (H): ICD-10-CM

## 2021-01-19 DIAGNOSIS — R13.10 DYSPHAGIA, UNSPECIFIED TYPE: ICD-10-CM

## 2021-01-19 DIAGNOSIS — R52 PAIN: Primary | ICD-10-CM

## 2021-01-19 DIAGNOSIS — S72.002D CLOSED FRACTURE OF NECK OF LEFT FEMUR WITH ROUTINE HEALING, SUBSEQUENT ENCOUNTER: ICD-10-CM

## 2021-01-19 DIAGNOSIS — G30.1 LATE ONSET ALZHEIMER'S DISEASE WITHOUT BEHAVIORAL DISTURBANCE (H): ICD-10-CM

## 2021-01-19 DIAGNOSIS — R53.81 PHYSICAL DECONDITIONING: ICD-10-CM

## 2021-01-19 DIAGNOSIS — I48.21 PERMANENT ATRIAL FIBRILLATION (H): ICD-10-CM

## 2021-01-19 DIAGNOSIS — E55.9 VITAMIN D DEFICIENCY: ICD-10-CM

## 2021-01-19 DIAGNOSIS — F02.80 LATE ONSET ALZHEIMER'S DISEASE WITHOUT BEHAVIORAL DISTURBANCE (H): ICD-10-CM

## 2021-01-19 DIAGNOSIS — E78.5 DYSLIPIDEMIA: ICD-10-CM

## 2021-01-19 LAB
BACTERIA SPEC CULT: NO GROWTH
BACTERIA SPEC CULT: NO GROWTH
SPECIMEN SOURCE: NORMAL
SPECIMEN SOURCE: NORMAL

## 2021-01-19 PROCEDURE — 99305 1ST NF CARE MODERATE MDM 35: CPT | Performed by: INTERNAL MEDICINE

## 2021-01-19 RX ORDER — OXYCODONE HYDROCHLORIDE 5 MG/1
2.5 TABLET ORAL EVERY 4 HOURS PRN
Qty: 20 TABLET | Refills: 0 | Status: SHIPPED | OUTPATIENT
Start: 2021-01-19 | End: 2021-02-08

## 2021-01-19 ASSESSMENT — MIFFLIN-ST. JEOR: SCORE: 1461.9

## 2021-01-20 ENCOUNTER — HOSPITAL LABORATORY (OUTPATIENT)
Dept: OTHER | Facility: CLINIC | Age: 86
End: 2021-01-20

## 2021-01-20 LAB
ANION GAP SERPL CALCULATED.3IONS-SCNC: 5 MMOL/L (ref 3–14)
BUN SERPL-MCNC: 20 MG/DL (ref 7–30)
CALCIUM SERPL-MCNC: 8.3 MG/DL (ref 8.5–10.1)
CHLORIDE SERPL-SCNC: 106 MMOL/L (ref 94–109)
CO2 SERPL-SCNC: 29 MMOL/L (ref 20–32)
CREAT SERPL-MCNC: 0.86 MG/DL (ref 0.66–1.25)
ERYTHROCYTE [DISTWIDTH] IN BLOOD BY AUTOMATED COUNT: 12.9 % (ref 10–15)
GFR SERPL CREATININE-BSD FRML MDRD: 78 ML/MIN/{1.73_M2}
GLUCOSE SERPL-MCNC: 90 MG/DL (ref 70–99)
HCT VFR BLD AUTO: 31.1 % (ref 40–53)
HGB BLD-MCNC: 10.2 G/DL (ref 13.3–17.7)
MCH RBC QN AUTO: 32.3 PG (ref 26.5–33)
MCHC RBC AUTO-ENTMCNC: 32.8 G/DL (ref 31.5–36.5)
MCV RBC AUTO: 98 FL (ref 78–100)
PLATELET # BLD AUTO: 301 10E9/L (ref 150–450)
POTASSIUM SERPL-SCNC: 4 MMOL/L (ref 3.4–5.3)
RBC # BLD AUTO: 3.16 10E12/L (ref 4.4–5.9)
SARS-COV-2 RNA RESP QL NAA+PROBE: NOT DETECTED
SODIUM SERPL-SCNC: 140 MMOL/L (ref 133–144)
SPECIMEN SOURCE: NORMAL
WBC # BLD AUTO: 7.6 10E9/L (ref 4–11)

## 2021-01-22 ENCOUNTER — NURSING HOME VISIT (OUTPATIENT)
Dept: GERIATRICS | Facility: CLINIC | Age: 86
End: 2021-01-22
Payer: COMMERCIAL

## 2021-01-22 VITALS
DIASTOLIC BLOOD PRESSURE: 66 MMHG | RESPIRATION RATE: 16 BRPM | HEART RATE: 83 BPM | OXYGEN SATURATION: 96 % | BODY MASS INDEX: 22.24 KG/M2 | TEMPERATURE: 97.5 F | SYSTOLIC BLOOD PRESSURE: 108 MMHG | WEIGHT: 164 LBS

## 2021-01-22 DIAGNOSIS — J69.0 ASPIRATION PNEUMONIA OF RIGHT LUNG, UNSPECIFIED ASPIRATION PNEUMONIA TYPE, UNSPECIFIED PART OF LUNG (H): ICD-10-CM
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
## 2021-01-22 DIAGNOSIS — E78.5 DYSLIPIDEMIA: ICD-10-CM

## 2021-01-22 DIAGNOSIS — G30.1 LATE ONSET ALZHEIMER'S DISEASE WITHOUT BEHAVIORAL DISTURBANCE (H): ICD-10-CM

## 2021-01-22 DIAGNOSIS — D62 ANEMIA DUE TO BLOOD LOSS, ACUTE: ICD-10-CM

## 2021-01-22 DIAGNOSIS — R73.03 PREDIABETES: ICD-10-CM

## 2021-01-22 DIAGNOSIS — N18.31 STAGE 3A CHRONIC KIDNEY DISEASE (H): ICD-10-CM

## 2021-01-22 DIAGNOSIS — I10 ESSENTIAL HYPERTENSION: ICD-10-CM

## 2021-01-22 DIAGNOSIS — S72.002D CLOSED FRACTURE OF NECK OF LEFT FEMUR WITH ROUTINE HEALING, SUBSEQUENT ENCOUNTER: Primary | ICD-10-CM

## 2021-01-22 DIAGNOSIS — I48.21 PERMANENT ATRIAL FIBRILLATION (H): ICD-10-CM

## 2021-01-22 DIAGNOSIS — E55.9 VITAMIN D DEFICIENCY: ICD-10-CM

## 2021-01-22 DIAGNOSIS — F02.80 LATE ONSET ALZHEIMER'S DISEASE WITHOUT BEHAVIORAL DISTURBANCE (H): ICD-10-CM

## 2021-01-22 DIAGNOSIS — J43.9 PULMONARY EMPHYSEMA, UNSPECIFIED EMPHYSEMA TYPE (H): ICD-10-CM

## 2021-01-22 DIAGNOSIS — R53.81 PHYSICAL DECONDITIONING: ICD-10-CM

## 2021-01-22 DIAGNOSIS — F32.A DEPRESSION, UNSPECIFIED DEPRESSION TYPE: ICD-10-CM

## 2021-01-22 DIAGNOSIS — R13.10 DYSPHAGIA, UNSPECIFIED TYPE: ICD-10-CM

## 2021-01-22 DIAGNOSIS — Z71.89 ADVANCED DIRECTIVES, COUNSELING/DISCUSSION: ICD-10-CM

## 2021-01-22 PROCEDURE — 99309 SBSQ NF CARE MODERATE MDM 30: CPT | Performed by: NURSE PRACTITIONER

## 2021-01-22 NOTE — PROGRESS NOTES
[facility]  [place] Medical Record Number:  [id number]  Place of Service where encounter took place:  [facility] [[id number]]  Chief Complaint   Patient presents with     RECHECK       HPI:    Maricarmen Lovell  is a 86 year old (4/8/1934), who is being seen today for an episodic care visit.  HPI information obtained from: facility chart records, facility staff, patient report and [facility] chart review.     Per recent TCU provider progress notes:  86 year old male with PMHx of Alzheimer's dementia, HTN, HLD, permanent atrial fibrillation, CKD stage III, CVA, depression, emphysema, and impaired fasting glucose hospitalized with left hip pain after fall. X-ray of left hip: acute impacted and varus angulated fracture of the left femoral neck, now s/p left hip hemiarthroplasty 1/12/2021.  Hgb 10.4 due to blood loss of surgery. Started on ASA for DVT prophylaxis. Developed aspiration pneumonia, treated with Unasyn with subsequent transition to Augmentin. SLP: started on dysphagia diet level 1 with nectar consistency.  TCU for therapies. Increased tylenol and decreased oxycodone at TCU.    Today's concern is:  Patient seen for episodic TCU visit. Known dementia - poor historian. Denies headaches, dizziness, chest pain, dyspnea, bowel or bladder issues today. PTA lives in senior living with services. POLST available in medical records: DNR/DNI status selected. Per EMR note BP range /55-70. Denies pain today. Sats 96% on room air. Therapies report walks 18 ft with 2WW.     Past Medical and Surgical History reviewed in Epic today.    MEDICATIONS:  Current Outpatient Medications   Medication Sig Dispense Refill     acetaminophen (TYLENOL) 500 MG tablet Take 1,000 mg by mouth 3 times daily And 1000mg daily PRN, max 4gm/day       atorvastatin (LIPITOR) 20 MG tablet Take 1 tablet (20 mg) by mouth every evening       hypromellose (ARTIFICIAL TEARS) 0.5 % SOLN ophthalmic solution  Place 2 drops into both eyes every 6 hours as needed for dry eyes        melatonin 3 MG tablet Take 3 mg by mouth At Bedtime        ondansetron (ZOFRAN-ODT) 4 MG ODT tab Take 1 tablet (4 mg) by mouth every 6 hours as needed for nausea or vomiting 5 tablet 0     oxyCODONE (ROXICODONE) 5 MG tablet Take 0.5 tablets (2.5 mg) by mouth every 4 hours as needed for severe pain 20 tablet 0     polyethylene glycol (MIRALAX) 17 g packet Take 1 packet by mouth daily        QUEtiapine (SEROQUEL) 25 MG tablet Take 12.5 mg by mouth every evening       QUEtiapine (SEROQUEL) 25 MG tablet Take 25 mg by mouth 3 times daily as needed        rivaroxaban ANTICOAGULANT (XARELTO) 15 MG TABS tablet Take 15 mg by mouth every morning       STIMULANT LAXATIVE 8.6-50 MG tablet TAKE ONE TABLET BY MOUTH TWICE A DAY FOR CONSTIPATION (Patient taking differently: Take 1 tablet by mouth 2 times daily ) 100 tablet 0     venlafaxine (EFFEXOR-ER) 225 MG 24 hr tablet Take 225 mg by mouth every evening        vitamin D3 (CHOLECALCIFEROL) 50 mcg (2000 units) tablet Take 50 mcg by mouth every morning          REVIEW OF SYSTEMS:  Limited secondary to cognitive impairment but today pt reports no pain, dyspnea, dizziness, bowel or bladder issues    Objective:  /66   Pulse 83   Temp 97.5  F (36.4  C)   Resp 16   Wt 74.4 kg (164 lb)   SpO2 96%   BMI 22.24 kg/m    Exam:  GENERAL APPEARANCE:  Alert, in no distress, pleasant, cooperative, oriented x self  EYES:  EOM, lids, pupils and irises normal, sclera clear and conjunctiva normal, no discharge or mattering on lids or lashes noted  ENT:  Mouth normal, moist mucous membranes, nose normal without drainage or crusting, external ears without lesions, hearing acuity appears intact  NECK: supple, symmetrical, trachea midline  RESP:  respiratory effort normal, no chest wall tenderness, no respiratory distress, Lung sounds clear, patient is on room air  CV:  Auscultation of heart done, rate and rhythm  controlled and regular, no murmur, no rub or gallop. Edema trace bilateral lower extremities.   ABDOMEN:  normal bowel sounds, soft, nontender, no palpable masses.  M/S:   Gait and station walks with assist , no tenderness or swelling of the joints; able to move all extremities, digits normal  NEURO: cranial nerves 2-12 grossly intact, no facial asymmetry, able to follow directions, moves all extremities symmetrically  PSYCH:  insight and judgement and memory impaired, affect and mood normal     Labs:   Most Recent 3 CBC's:  Recent Labs   Lab Test 01/20/21  0729 01/15/21  0942 01/14/21  0826 01/13/21  0523   WBC 7.6  --  9.2 14.6*   HGB 10.2* 10.4* 10.8* 12.4*   MCV 98  --  99 98     --  129* 181     Most Recent 3 BMP's:  Recent Labs   Lab Test 01/20/21  0729 01/15/21  0942 01/14/21  0826 01/12/21  0151     --  142 139   POTASSIUM 4.0  --  4.0 4.3   CHLORIDE 106  --  111* 107   CO2 29  --  31 30   BUN 20  --  21 28   CR 0.86  --  0.85 1.10   ANIONGAP 5  --  <1* 2*   BEATRIZ 8.3*  --  8.3* 9.1   GLC 90 102* 155* 102*       ASSESSMENT/PLAN:  Fall, subsequent encounter  Left femoral neck fracture, status post left hemiarthroplasty  Physical deconditioning  Acute injury, now s/p repair. Pain managed adequately and up with walker, WBAT. Continue Xarelto for DVT prophylaxis, f/u ortho as planned. Therapies as ordered - f/u with progress next visit.     Dysphagia  Right-sided aspiration pneumonia  Acute, improved with completed course of Augmentin. Speech therapy and f/u with recommendations.     Acute blood loss anemia  Acute with surgery, last check Hgb stable. F/U next week.     Permanent atrial fibrillation  Chronic and rate controlled, on AC. Monitor.      Hypertension  Hx of HTN, currently off meds. Monitor vs per routine and f/u with ranges next visit.      CKD stage IIIa  Chronic with baseline creatinine 1-1.1, 0.86 last check. Avoid nephrotoxins and monitor BMP PRN.      Pulmonary emphysema  No symptoms,  monitor.      Dyslipidemia  Continue statin as PTA.      Depression  Alzheimer's disease  Chronic, continue venlafaxine  mg p.o. daily and Seroquel 12.5 mg p.o. every evening as well as 25 mg TID PRN. Staff to update provider if not effective. Monitor for safety.     Prediabetes  Diet controlled. Monitor BG PRN    Vitamin D deficiency  Continue PTA vitamin D 2000 units p.o. daily    Advance directive counseling  Reviewed POLST - DNR/DNI status per previous documentation    Orders written by provider at facility  1. DNR/DNI  2. Hgb check one week diagnosis post op anemia    Electronically signed by:  NARESH Roche CNP

## 2021-01-24 ENCOUNTER — TELEPHONE (OUTPATIENT)
Dept: GERIATRICS | Facility: CLINIC | Age: 86
End: 2021-01-24

## 2021-01-24 ENCOUNTER — HOSPITAL LABORATORY (OUTPATIENT)
Dept: OTHER | Facility: CLINIC | Age: 86
End: 2021-01-24

## 2021-01-24 VITALS
WEIGHT: 193.4 LBS | OXYGEN SATURATION: 92 % | DIASTOLIC BLOOD PRESSURE: 61 MMHG | SYSTOLIC BLOOD PRESSURE: 118 MMHG | RESPIRATION RATE: 18 BRPM | TEMPERATURE: 98.4 F | BODY MASS INDEX: 26.23 KG/M2 | HEART RATE: 84 BPM

## 2021-01-24 LAB
ALBUMIN UR-MCNC: NEGATIVE MG/DL
APPEARANCE UR: CLEAR
BILIRUB UR QL STRIP: NEGATIVE
COLOR UR AUTO: YELLOW
GLUCOSE UR STRIP-MCNC: NEGATIVE MG/DL
HGB UR QL STRIP: NEGATIVE
KETONES UR STRIP-MCNC: NEGATIVE MG/DL
LEUKOCYTE ESTERASE UR QL STRIP: NEGATIVE
NITRATE UR QL: NEGATIVE
PH UR STRIP: 6 PH (ref 5–7)
RBC #/AREA URNS AUTO: 12 /HPF (ref 0–2)
SOURCE: ABNORMAL
SP GR UR STRIP: 1.02 (ref 1–1.03)
SQUAMOUS #/AREA URNS AUTO: <1 /HPF (ref 0–1)
UROBILINOGEN UR STRIP-MCNC: 2 MG/DL (ref 0–2)
WBC #/AREA URNS AUTO: 1 /HPF (ref 0–5)

## 2021-01-24 NOTE — TELEPHONE ENCOUNTER
Nurse called, abdominal pain, incontinent, Hx of bloody stools, patient unable to explain issues, unknown etiology.  -Scath for urine retention  -UA/UC  -FOBT  -CBC/BMP on 1/25  -AXR

## 2021-01-25 ENCOUNTER — NURSING HOME VISIT (OUTPATIENT)
Dept: GERIATRICS | Facility: CLINIC | Age: 86
End: 2021-01-25
Payer: COMMERCIAL

## 2021-01-25 ENCOUNTER — HOSPITAL LABORATORY (OUTPATIENT)
Dept: OTHER | Facility: CLINIC | Age: 86
End: 2021-01-25

## 2021-01-25 DIAGNOSIS — K62.5 BRBPR (BRIGHT RED BLOOD PER RECTUM): ICD-10-CM

## 2021-01-25 DIAGNOSIS — D62 ANEMIA DUE TO BLOOD LOSS, ACUTE: ICD-10-CM

## 2021-01-25 DIAGNOSIS — E55.9 VITAMIN D DEFICIENCY: ICD-10-CM

## 2021-01-25 DIAGNOSIS — R33.9 URINARY RETENTION: ICD-10-CM

## 2021-01-25 DIAGNOSIS — R73.03 PREDIABETES: ICD-10-CM

## 2021-01-25 DIAGNOSIS — F02.80 LATE ONSET ALZHEIMER'S DISEASE WITHOUT BEHAVIORAL DISTURBANCE (H): ICD-10-CM

## 2021-01-25 DIAGNOSIS — J69.0 ASPIRATION PNEUMONIA OF RIGHT LUNG, UNSPECIFIED ASPIRATION PNEUMONIA TYPE, UNSPECIFIED PART OF LUNG (H): ICD-10-CM

## 2021-01-25 DIAGNOSIS — J43.9 PULMONARY EMPHYSEMA, UNSPECIFIED EMPHYSEMA TYPE (H): ICD-10-CM

## 2021-01-25 DIAGNOSIS — N18.31 STAGE 3A CHRONIC KIDNEY DISEASE (H): ICD-10-CM

## 2021-01-25 DIAGNOSIS — E78.5 DYSLIPIDEMIA: ICD-10-CM

## 2021-01-25 DIAGNOSIS — R53.81 PHYSICAL DECONDITIONING: ICD-10-CM

## 2021-01-25 DIAGNOSIS — I10 ESSENTIAL HYPERTENSION: ICD-10-CM

## 2021-01-25 DIAGNOSIS — I48.21 PERMANENT ATRIAL FIBRILLATION (H): ICD-10-CM

## 2021-01-25 DIAGNOSIS — D72.829 LEUKOCYTOSIS, UNSPECIFIED TYPE: ICD-10-CM

## 2021-01-25 DIAGNOSIS — R13.10 DYSPHAGIA, UNSPECIFIED TYPE: ICD-10-CM

## 2021-01-25 DIAGNOSIS — S72.002D CLOSED FRACTURE OF NECK OF LEFT FEMUR WITH ROUTINE HEALING, SUBSEQUENT ENCOUNTER: ICD-10-CM

## 2021-01-25 DIAGNOSIS — F32.A DEPRESSION, UNSPECIFIED DEPRESSION TYPE: ICD-10-CM
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
## 2021-01-25 DIAGNOSIS — G30.1 LATE ONSET ALZHEIMER'S DISEASE WITHOUT BEHAVIORAL DISTURBANCE (H): ICD-10-CM

## 2021-01-25 LAB
ANION GAP SERPL CALCULATED.3IONS-SCNC: 6 MMOL/L (ref 3–14)
BACTERIA SPEC CULT: NO GROWTH
BUN SERPL-MCNC: 16 MG/DL (ref 7–30)
CALCIUM SERPL-MCNC: 8.8 MG/DL (ref 8.5–10.1)
CHLORIDE SERPL-SCNC: 107 MMOL/L (ref 94–109)
CO2 SERPL-SCNC: 28 MMOL/L (ref 20–32)
CREAT SERPL-MCNC: 0.93 MG/DL (ref 0.66–1.25)
ERYTHROCYTE [DISTWIDTH] IN BLOOD BY AUTOMATED COUNT: 13.5 % (ref 10–15)
GFR SERPL CREATININE-BSD FRML MDRD: 73 ML/MIN/{1.73_M2}
GLUCOSE SERPL-MCNC: 91 MG/DL (ref 70–99)
HCT VFR BLD AUTO: 33.9 % (ref 40–53)
HGB BLD-MCNC: 10.6 G/DL (ref 13.3–17.7)
Lab: NORMAL
MCH RBC QN AUTO: 32.3 PG (ref 26.5–33)
MCHC RBC AUTO-ENTMCNC: 31.3 G/DL (ref 31.5–36.5)
MCV RBC AUTO: 103 FL (ref 78–100)
PLATELET # BLD AUTO: 425 10E9/L (ref 150–450)
POTASSIUM SERPL-SCNC: 3.9 MMOL/L (ref 3.4–5.3)
RBC # BLD AUTO: 3.28 10E12/L (ref 4.4–5.9)
SODIUM SERPL-SCNC: 141 MMOL/L (ref 133–144)
SPECIMEN SOURCE: NORMAL
WBC # BLD AUTO: 11.7 10E9/L (ref 4–11)

## 2021-01-25 PROCEDURE — 99310 SBSQ NF CARE HIGH MDM 45: CPT | Performed by: NURSE PRACTITIONER

## 2021-01-25 NOTE — PROGRESS NOTES
[facility]  [facility] Record Number:  [id number]  Place of Service where encounter took place:  [facility] [[id number]]  Chief Complaint   Patient presents with     RECHECK       HPI:    Maricarmen Lovell  is a 86 year old (4/8/1934), who is being seen today for an episodic care visit.  HPI information obtained from: facility chart records, facility staff, patient report and [facility] chart review.     Per recent TCU provider progress notes:  86 year old male with PMHx of Alzheimer's dementia, HTN, HLD, permanent atrial fibrillation, CKD stage III, CVA, depression, emphysema, and impaired fasting glucose hospitalized with left hip pain after fall. X-ray of left hip: acute impacted and varus angulated fracture of the left femoral neck, now s/p left hip hemiarthroplasty 1/12/2021.  Hgb 10.4 due to blood loss of surgery. Started on ASA for DVT prophylaxis. Developed aspiration pneumonia, treated with Unasyn with subsequent transition to Augmentin. SLP: started on dysphagia diet level 1 with nectar consistency.  TCU for therapies. Increased tylenol and decreased oxycodone at TCU.     On 1/24 c/o abdominal pain, hx bloody stools. Guaiac positive, UA negative thus far.     Today's concern is:  Patient seen for episodic TCU visit. Known dementia - poor historian. Denies headaches, dizziness, chest pain, dyspnea. Staff report urinary retention on and off - needed str cath over the weekend, UA/UC sent and negative thus far. Reports constipation - unsure if hx hemorrhoids. Per EMR note BP range 106-140/60-81 and weight down 4 lbs since admission. Denies pain today. Sats 94% on room air. Therapies report walks 18 ft with 2WW.     Past Medical and Surgical History reviewed in Epic today.    MEDICATIONS:  Current Outpatient Medications   Medication Sig Dispense Refill     acetaminophen (TYLENOL) 500 MG tablet Take 1,000 mg by mouth 3 times daily And 1000mg daily PRN, max 4gm/day        atorvastatin (LIPITOR) 20 MG tablet Take 1 tablet (20 mg) by mouth every evening       hypromellose (ARTIFICIAL TEARS) 0.5 % SOLN ophthalmic solution Place 2 drops into both eyes every 6 hours as needed for dry eyes        melatonin 3 MG tablet Take 3 mg by mouth At Bedtime        ondansetron (ZOFRAN-ODT) 4 MG ODT tab Take 1 tablet (4 mg) by mouth every 6 hours as needed for nausea or vomiting 5 tablet 0     oxyCODONE (ROXICODONE) 5 MG tablet Take 0.5 tablets (2.5 mg) by mouth every 4 hours as needed for severe pain 20 tablet 0     polyethylene glycol (MIRALAX) 17 g packet Take 1 packet by mouth daily        QUEtiapine (SEROQUEL) 25 MG tablet Take 12.5 mg by mouth every evening       QUEtiapine (SEROQUEL) 25 MG tablet Take 25 mg by mouth 3 times daily as needed        rivaroxaban ANTICOAGULANT (XARELTO) 15 MG TABS tablet Take 15 mg by mouth every morning       STIMULANT LAXATIVE 8.6-50 MG tablet TAKE ONE TABLET BY MOUTH TWICE A DAY FOR CONSTIPATION (Patient taking differently: Take 1 tablet by mouth 2 times daily ) 100 tablet 0     venlafaxine (EFFEXOR-ER) 225 MG 24 hr tablet Take 225 mg by mouth every evening        vitamin D3 (CHOLECALCIFEROL) 50 mcg (2000 units) tablet Take 50 mcg by mouth every morning          REVIEW OF SYSTEMS:  Limited secondary to cognitive impairment but today pt reports no pain, dyspnea, dizziness, bowel or bladder issues    Objective:  /61   Pulse 84   Temp 98.4  F (36.9  C)   Resp 18   Wt 87.7 kg (193 lb 6.4 oz)   SpO2 92%   BMI 26.23 kg/m    Exam:  GENERAL APPEARANCE:  Alert, in no distress, pleasant, cooperative, oriented x self  EYES:  EOM, lids, pupils and irises normal, sclera clear and conjunctiva normal, no discharge or mattering on lids or lashes noted  ENT:  Mouth normal, moist mucous membranes, nose normal without drainage or crusting, external ears without lesions, hearing acuity appears intact  NECK: supple, symmetrical, trachea midline  RESP:  respiratory  effort normal, no chest wall tenderness, no respiratory distress, Lung sounds diminished right base, otherwise clear and patient is on room air  CV:  Auscultation of heart done, rate and rhythm controlled and regular, no murmur, no rub or gallop. Edema trace bilateral lower extremities.   ABDOMEN/GI:  normal bowel sounds, soft, nontender, no palpable masses. No external hemorrhoid visible on exam.   M/S:   Gait and station walks with assist, no tenderness or swelling of the joints; able to move all extremities, digits normal  SKIN: left hip incision no redness or drainage no warmth or open areas.   NEURO: no facial asymmetry, able to follow directions, moves all extremities symmetrically  PSYCH:  insight and judgement and memory impaired, affect and mood normal     Labs:   1/24 abdominal xray negative  Most Recent 3 CBC's:  Recent Labs   Lab Test 01/25/21  0540 01/20/21  0729 01/15/21  0942 01/14/21  0826   WBC 11.7* 7.6  --  9.2   HGB 10.6* 10.2* 10.4* 10.8*   * 98  --  99    301  --  129*     Most Recent 3 BMP's:  Recent Labs   Lab Test 01/25/21  0540 01/20/21  0729 01/15/21  0942 01/14/21  0826    140  --  142   POTASSIUM 3.9 4.0  --  4.0   CHLORIDE 107 106  --  111*   CO2 28 29  --  31   BUN 16 20  --  21   CR 0.93 0.86  --  0.85   ANIONGAP 6 5  --  <1*   BEATRIZ 8.8 8.3*  --  8.3*   GLC 91 90 102* 155*       ASSESSMENT/PLAN:  Fall, subsequent encounter  Left femoral neck fracture, status post left hemiarthroplasty  Physical deconditioning  Acute injury, now s/p repair. Pain managed adequately and up with walker, WBAT. Continue Xarelto for DVT prophylaxis, f/u ortho as planned. Therapies as ordered - f/u with progress next visit. Aquacel dressing left hip falling off - will remove, ask staff to cover with dry dressing and change PRN.     Dysphagia  Right-sided aspiration pneumonia  Acute, improved with completed course of Augmentin. Speech therapy and f/u with recommendations. Monitor RESP status  and staff to update with any deterioration.     Acute blood loss anemia  BRBPR  Acute with surgery, last check Hgb stable and actually improved. Did have one positive guaiac but no dark stool or abdominal pain, nausea. Does complain of constipation so ?blood due to straining. F/U with Hgb this week, monitor for any s/s bleeding.  Increase laxatives as ordered below.     Permanent atrial fibrillation  Chronic and rate controlled, on AC. Monitor.      Hypertension  Hx of HTN, currently off meds. Monitor vs per routine and f/u with ranges next visit.      CKD stage IIIa  Chronic with baseline creatinine 1-1.1, 0.93 last check. Avoid nephrotoxins and monitor BMP PRN.      Pulmonary emphysema  No symptoms, monitor.      Dyslipidemia  Continue statin as PTA.      Depression  Alzheimer's disease  Chronic, continue venlafaxine  mg p.o. daily and Seroquel 12.5 mg p.o. every evening as well as 25 mg TID PRN. Staff to update provider if not effective. Monitor for safety.     Prediabetes  Diet controlled. Monitor BG PRN    Vitamin D deficiency  Continue PTA vitamin D 2000 units p.o. daily    Leukocytosis  Acute - noted minimally increased WBC today. UC pending, no other symptoms. Repeat CBC and check procalcitonin this week, f/u with results or any new changes in status.     Urinary retention  Newly reported over the weekend. Check PVR, update provider if Solorio placed. UC pending.     Orders written by provider at facility  1. PVR check every shift x 48 hrs. If over 350 ml please str cath. If need to cath third time place Solorio 16F 10 ml and let provider know  2. CBC with diff, procalcitonin on 1/27 diagnosis anemia, leukocytosis, BRBPR  3. Miralax increase to 17 gm PO BID diagnosis constipation    Total unit/floor time of 40 minutes consisted of the following: Examination of patient, reviewing the record including pertinent labs and imaging, placing orders, and completing documentation.  More than 50% of this time (21  minutes) (>50%) was spent in coordination of care with the patient, nursing staff and other healthcare providers.   This time was spent on discussing the care plan including recent TCU course and concerns, medications, wound cares, cognitive impairment, discharge/safe placement, specialty follow up need.       Time with patient included: Discussion of diagnostic and imaging test results, diagnosis and prognosis, overall goal and disposition plan.      -Medical decision making and discussions included:  Risks/benefits of checking PVR, using catheterization as needed  Risks/benefits of Miralax for constipation dose increase  Wound care details: remove Aquacel, use dry dressing  PT/OT restrictions to include up with assist  DM management plan to include monitor BG PRN  HTN management plan to include monitor vs, review each visit  ANEMIA management plan to include re-check CBC this week. Monitor for any further BRBPR.     -Rx management plan discussion included:  Follow up needed with ortho as recommended  Disposition and discharge plan to include likely need for home care after discharge  Medication changes to include increase Miralax frequency  Patient education concerning lab results and planned work up of symptoms    -Time on communication of care included:  Updated RN, RN manager, HUC on above orders and POC    Electronically signed by:  NARESH Roche CNP

## 2021-01-27 ENCOUNTER — HOSPITAL LABORATORY (OUTPATIENT)
Dept: OTHER | Facility: CLINIC | Age: 86
End: 2021-01-27

## 2021-01-27 LAB
BASOPHILS # BLD AUTO: 0 10E9/L (ref 0–0.2)
BASOPHILS NFR BLD AUTO: 0.2 %
DIFFERENTIAL METHOD BLD: ABNORMAL
EOSINOPHIL # BLD AUTO: 0.1 10E9/L (ref 0–0.7)
EOSINOPHIL NFR BLD AUTO: 1.1 %
ERYTHROCYTE [DISTWIDTH] IN BLOOD BY AUTOMATED COUNT: 13.8 % (ref 10–15)
HCT VFR BLD AUTO: 35 % (ref 40–53)
HGB BLD-MCNC: 10.8 G/DL (ref 13.3–17.7)
IMM GRANULOCYTES # BLD: 0.1 10E9/L (ref 0–0.4)
IMM GRANULOCYTES NFR BLD: 0.4 %
LYMPHOCYTES # BLD AUTO: 1.7 10E9/L (ref 0.8–5.3)
LYMPHOCYTES NFR BLD AUTO: 14.2 %
MCH RBC QN AUTO: 32.1 PG (ref 26.5–33)
MCHC RBC AUTO-ENTMCNC: 30.9 G/DL (ref 31.5–36.5)
MCV RBC AUTO: 104 FL (ref 78–100)
MONOCYTES # BLD AUTO: 0.9 10E9/L (ref 0–1.3)
MONOCYTES NFR BLD AUTO: 7.7 %
NEUTROPHILS # BLD AUTO: 9.3 10E9/L (ref 1.6–8.3)
NEUTROPHILS NFR BLD AUTO: 76.4 %
NRBC # BLD AUTO: 0 10*3/UL
NRBC BLD AUTO-RTO: 0 /100
PLATELET # BLD AUTO: 442 10E9/L (ref 150–450)
PROCALCITONIN SERPL-MCNC: NORMAL NG/ML
RBC # BLD AUTO: 3.36 10E12/L (ref 4.4–5.9)
WBC # BLD AUTO: 12.2 10E9/L (ref 4–11)

## 2021-01-29 ENCOUNTER — HOSPITAL LABORATORY (OUTPATIENT)
Dept: OTHER | Facility: CLINIC | Age: 86
End: 2021-01-29

## 2021-01-29 LAB — HGB BLD-MCNC: 10.1 G/DL (ref 13.3–17.7)

## 2021-02-01 ENCOUNTER — NURSING HOME VISIT (OUTPATIENT)
Dept: GERIATRICS | Facility: CLINIC | Age: 86
End: 2021-02-01
Payer: COMMERCIAL

## 2021-02-01 VITALS
SYSTOLIC BLOOD PRESSURE: 148 MMHG | WEIGHT: 189.1 LBS | DIASTOLIC BLOOD PRESSURE: 86 MMHG | RESPIRATION RATE: 16 BRPM | HEART RATE: 73 BPM | TEMPERATURE: 97.1 F | BODY MASS INDEX: 25.65 KG/M2 | OXYGEN SATURATION: 96 %

## 2021-02-01 DIAGNOSIS — K62.5 BRBPR (BRIGHT RED BLOOD PER RECTUM): ICD-10-CM

## 2021-02-01 DIAGNOSIS — R53.81 PHYSICAL DECONDITIONING: ICD-10-CM

## 2021-02-01 DIAGNOSIS — D72.829 LEUKOCYTOSIS, UNSPECIFIED TYPE: ICD-10-CM

## 2021-02-01 DIAGNOSIS — E78.5 DYSLIPIDEMIA: ICD-10-CM

## 2021-02-01 DIAGNOSIS — D62 ANEMIA DUE TO BLOOD LOSS, ACUTE: ICD-10-CM

## 2021-02-01 DIAGNOSIS — F32.A DEPRESSION, UNSPECIFIED DEPRESSION TYPE: ICD-10-CM

## 2021-02-01 DIAGNOSIS — R13.10 DYSPHAGIA, UNSPECIFIED TYPE: ICD-10-CM

## 2021-02-01 DIAGNOSIS — S72.002D CLOSED FRACTURE OF NECK OF LEFT FEMUR WITH ROUTINE HEALING, SUBSEQUENT ENCOUNTER: ICD-10-CM
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
## 2021-02-01 DIAGNOSIS — J69.0 ASPIRATION PNEUMONIA OF RIGHT LUNG, UNSPECIFIED ASPIRATION PNEUMONIA TYPE, UNSPECIFIED PART OF LUNG (H): ICD-10-CM

## 2021-02-01 DIAGNOSIS — E55.9 VITAMIN D DEFICIENCY: ICD-10-CM

## 2021-02-01 DIAGNOSIS — N18.31 STAGE 3A CHRONIC KIDNEY DISEASE (H): ICD-10-CM

## 2021-02-01 DIAGNOSIS — J43.9 PULMONARY EMPHYSEMA, UNSPECIFIED EMPHYSEMA TYPE (H): ICD-10-CM

## 2021-02-01 DIAGNOSIS — R73.03 PREDIABETES: ICD-10-CM

## 2021-02-01 DIAGNOSIS — F02.80 LATE ONSET ALZHEIMER'S DISEASE WITHOUT BEHAVIORAL DISTURBANCE (H): ICD-10-CM

## 2021-02-01 DIAGNOSIS — I48.21 PERMANENT ATRIAL FIBRILLATION (H): ICD-10-CM

## 2021-02-01 DIAGNOSIS — R33.9 URINARY RETENTION: ICD-10-CM

## 2021-02-01 DIAGNOSIS — G30.1 LATE ONSET ALZHEIMER'S DISEASE WITHOUT BEHAVIORAL DISTURBANCE (H): ICD-10-CM

## 2021-02-01 DIAGNOSIS — I10 ESSENTIAL HYPERTENSION: ICD-10-CM

## 2021-02-01 PROCEDURE — 99309 SBSQ NF CARE MODERATE MDM 30: CPT | Performed by: NURSE PRACTITIONER

## 2021-02-01 NOTE — PROGRESS NOTES
[facility]  [facility] Record Number:  [id number]  Place of Service where encounter took place:  [facility] [[id number]]  Chief Complaint   Patient presents with     RECHECK       HPI:    Maricarmen Lovell  is a 86 year old (4/8/1934), who is being seen today for an episodic care visit.  HPI information obtained from: facility chart records, facility staff, patient report and [facility] chart review.     Per recent TCU provider progress notes:  86 year old male with PMHx of Alzheimer's dementia, HTN, HLD, permanent atrial fibrillation, CKD stage III, CVA, depression, emphysema, and impaired fasting glucose hospitalized with left hip pain after fall. X-ray of left hip: acute impacted and varus angulated fracture of the left femoral neck, now s/p left hip hemiarthroplasty 1/12/2021.  Hgb 10.4 due to blood loss of surgery. Started on ASA for DVT prophylaxis. Developed aspiration pneumonia, treated with Unasyn with subsequent transition to Augmentin. SLP: started on dysphagia diet level 1 with nectar consistency.  TCU for therapies. Increased tylenol and decreased oxycodone at TCU.     On 1/24 c/o abdominal pain, hx bloody stools. Guaiac positive, UA/UC negative. WBC 11.7 on 1/25 -> 12.2 on 1/27. Hgb 10.8 on 1/27 -> 10.1 on 1/29    Today's concern is:  Patient seen for episodic TCU visit. Known dementia - poor historian. Denies headaches, dizziness, chest pain, dyspnea. Reports able to void. No constipation this week. Per EMR note BP range 114-148/63-86 and weight down 9 lbs since admission. HR 70-80s and sats 96% on room air. Denies pain today. Therapies report walks 5 steps with 2WW and CGA.     Past Medical and Surgical History reviewed in Epic today.    MEDICATIONS:  Current Outpatient Medications   Medication Sig Dispense Refill     acetaminophen (TYLENOL) 500 MG tablet Take 1,000 mg by mouth 3 times daily And 1000mg daily PRN, max 4gm/day       atorvastatin (LIPITOR) 20  MG tablet Take 1 tablet (20 mg) by mouth every evening       hypromellose (ARTIFICIAL TEARS) 0.5 % SOLN ophthalmic solution Place 2 drops into both eyes every 6 hours as needed for dry eyes        melatonin 3 MG tablet Take 3 mg by mouth At Bedtime        ondansetron (ZOFRAN-ODT) 4 MG ODT tab Take 1 tablet (4 mg) by mouth every 6 hours as needed for nausea or vomiting 5 tablet 0     oxyCODONE (ROXICODONE) 5 MG tablet Take 0.5 tablets (2.5 mg) by mouth every 4 hours as needed for severe pain 20 tablet 0     polyethylene glycol (MIRALAX) 17 g packet Take 1 packet by mouth daily        QUEtiapine (SEROQUEL) 25 MG tablet Take 12.5 mg by mouth every evening       QUEtiapine (SEROQUEL) 25 MG tablet Take 25 mg by mouth 3 times daily as needed        rivaroxaban ANTICOAGULANT (XARELTO) 15 MG TABS tablet Take 15 mg by mouth every morning       STIMULANT LAXATIVE 8.6-50 MG tablet TAKE ONE TABLET BY MOUTH TWICE A DAY FOR CONSTIPATION (Patient taking differently: Take 1 tablet by mouth 2 times daily ) 100 tablet 0     venlafaxine (EFFEXOR-ER) 225 MG 24 hr tablet Take 225 mg by mouth every evening        vitamin D3 (CHOLECALCIFEROL) 50 mcg (2000 units) tablet Take 50 mcg by mouth every morning          REVIEW OF SYSTEMS:  Limited secondary to cognitive impairment but today pt reports no pain, dyspnea, dizziness, bowel or bladder issues    Objective:  BP (!) 148/86   Pulse 73   Temp 97.1  F (36.2  C)   Resp 16   Wt 85.8 kg (189 lb 1.6 oz)   SpO2 96%   BMI 25.65 kg/m    Exam:  GENERAL APPEARANCE:  Alert, in no distress, pleasant, cooperative, oriented x self  EYES:  EOM, lids, pupils and irises normal, sclera clear and conjunctiva normal, no discharge or mattering on lids or lashes noted  ENT:  Mouth normal, moist mucous membranes, nose normal without drainage or crusting, external ears without lesions, hearing acuity appears intact  RESP:  respiratory effort normal, no chest wall tenderness, no respiratory distress and  patient is on room air  CV:  Edema trace bilateral lower extremities.   M/S:   Gait and station walks with assist, no tenderness or swelling of the joints; able to move all extremities, digits normal  SKIN: left hip incision no redness or drainage no warmth or open areas.   NEURO: no facial asymmetry, able to follow directions, moves all extremities symmetrically  PSYCH:  insight and judgement and memory impaired, affect and mood normal     Labs:   Most Recent 3 CBC's:  Recent Labs   Lab Test 02/02/21  0641 01/29/21  0523 01/27/21  0510 01/25/21  0540   WBC 7.5  --  12.2* 11.7*   HGB 11.5* 10.1* 10.8* 10.6*   MCV 98  --  104* 103*   *  --  442 425     Most Recent 3 BMP's:  Recent Labs   Lab Test 01/25/21  0540 01/20/21  0729 01/15/21  0942 01/14/21  0826    140  --  142   POTASSIUM 3.9 4.0  --  4.0   CHLORIDE 107 106  --  111*   CO2 28 29  --  31   BUN 16 20  --  21   CR 0.93 0.86  --  0.85   ANIONGAP 6 5  --  <1*   BEATRIZ 8.8 8.3*  --  8.3*   GLC 91 90 102* 155*       ASSESSMENT/PLAN:  Fall, subsequent encounter  Left femoral neck fracture, status post left hemiarthroplasty  Physical deconditioning  Acute injury, now s/p repair. Pain managed adequately and up with walker, WBAT. Continue Xarelto for DVT prophylaxis, f/u ortho as planned. Therapies as ordered - f/u with progress next visit.     Dysphagia  Right-sided aspiration pneumonia  Acute, improved with completed course of Augmentin. Monitor RESP status and staff to update with any deterioration.     Acute blood loss anemia  BRBPR  Acute with surgery, also noted BRBPR after a bowel movement last week - none reported since. Did have one positive guaiac but no dark stool or abdominal pain, nausea. Did complain of constipation so ?blood due to straining. F/U Hgb last check minimally decreased - will recheck this week and monitor for any s/s bleeding.     Permanent atrial fibrillation  Chronic and rate controlled, on AC. Monitor.      Hypertension  Hx of  HTN, currently off meds. Monitor vs per routine and f/u with ranges next visit.      CKD stage IIIa  Chronic with baseline creatinine 1-1.1, 0.93 last check. Avoid nephrotoxins and monitor BMP PRN.      Pulmonary emphysema  No symptoms, on room air - monitor.      Dyslipidemia  Chronic. Continue statin as PTA.      Depression  Alzheimer's disease  Chronic, continue venlafaxine  mg p.o. daily and Seroquel 12.5 mg p.o. every evening as well as 25 mg TID PRN. Staff to update provider if not effective. Monitor for safety.     Prediabetes  Diet controlled. Monitor BG PRN    Vitamin D deficiency  Continue PTA vitamin D 2000 units p.o. daily    Leukocytosis  Acute - noted minimally increased WBC last check. UC negative no other symptoms. Repeat CBC this week, f/u with results or any new changes in status.     Urinary retention  Appears resolved - monitor.     Orders written by provider at facility  1. CBC on 2/2 diagnosis anemia and leukocytosis    Electronically signed by:  NARESH Roche CNP

## 2021-02-02 ENCOUNTER — HOSPITAL LABORATORY (OUTPATIENT)
Dept: OTHER | Facility: CLINIC | Age: 86
End: 2021-02-02

## 2021-02-02 LAB
ERYTHROCYTE [DISTWIDTH] IN BLOOD BY AUTOMATED COUNT: 13.1 % (ref 10–15)
HCT VFR BLD AUTO: 35.6 % (ref 40–53)
HGB BLD-MCNC: 11.5 G/DL (ref 13.3–17.7)
LABORATORY COMMENT REPORT: NORMAL
MCH RBC QN AUTO: 31.6 PG (ref 26.5–33)
MCHC RBC AUTO-ENTMCNC: 32.3 G/DL (ref 31.5–36.5)
MCV RBC AUTO: 98 FL (ref 78–100)
PLATELET # BLD AUTO: 502 10E9/L (ref 150–450)
RBC # BLD AUTO: 3.64 10E12/L (ref 4.4–5.9)
SARS-COV-2 RNA RESP QL NAA+PROBE: NEGATIVE
SARS-COV-2 RNA RESP QL NAA+PROBE: NORMAL
SPECIMEN SOURCE: NORMAL
SPECIMEN SOURCE: NORMAL
WBC # BLD AUTO: 7.5 10E9/L (ref 4–11)

## 2021-02-03 ENCOUNTER — HOSPITAL LABORATORY (OUTPATIENT)
Dept: OTHER | Facility: CLINIC | Age: 86
End: 2021-02-03

## 2021-02-03 ENCOUNTER — NURSING HOME VISIT (OUTPATIENT)
Dept: GERIATRICS | Facility: CLINIC | Age: 86
End: 2021-02-03
Payer: COMMERCIAL

## 2021-02-03 VITALS
RESPIRATION RATE: 16 BRPM | HEART RATE: 73 BPM | DIASTOLIC BLOOD PRESSURE: 77 MMHG | BODY MASS INDEX: 23.63 KG/M2 | OXYGEN SATURATION: 95 % | SYSTOLIC BLOOD PRESSURE: 128 MMHG | WEIGHT: 174.2 LBS | TEMPERATURE: 96.9 F

## 2021-02-03 DIAGNOSIS — S72.042D CLOSED DISPLACED FRACTURE OF BASE OF NECK OF LEFT FEMUR WITH ROUTINE HEALING: ICD-10-CM

## 2021-02-03 DIAGNOSIS — R30.0 DYSURIA: ICD-10-CM

## 2021-02-03 DIAGNOSIS — R39.9 URINARY TRACT INFECTION SYMPTOMS: Primary | ICD-10-CM

## 2021-02-03 DIAGNOSIS — R53.81 PHYSICAL DECONDITIONING: ICD-10-CM

## 2021-02-03 DIAGNOSIS — F02.80 LATE ONSET ALZHEIMER'S DISEASE WITHOUT BEHAVIORAL DISTURBANCE (H): ICD-10-CM

## 2021-02-03 DIAGNOSIS — R29.6 FALLING: ICD-10-CM

## 2021-02-03 DIAGNOSIS — G30.1 LATE ONSET ALZHEIMER'S DISEASE WITHOUT BEHAVIORAL DISTURBANCE (H): ICD-10-CM

## 2021-02-03 LAB
ALBUMIN UR-MCNC: 100 MG/DL
APPEARANCE UR: ABNORMAL
BILIRUB UR QL STRIP: NEGATIVE
COLOR UR AUTO: YELLOW
GLUCOSE UR STRIP-MCNC: NEGATIVE MG/DL
HGB UR QL STRIP: ABNORMAL
KETONES UR STRIP-MCNC: NEGATIVE MG/DL
LEUKOCYTE ESTERASE UR QL STRIP: ABNORMAL
NITRATE UR QL: NEGATIVE
PH UR STRIP: 6 PH (ref 5–7)
RBC #/AREA URNS AUTO: 11 /HPF (ref 0–2)
SOURCE: ABNORMAL
SP GR UR STRIP: 1.02 (ref 1–1.03)
UROBILINOGEN UR STRIP-MCNC: 0 MG/DL (ref 0–2)
WBC #/AREA URNS AUTO: >182 /HPF (ref 0–5)
WBC CLUMPS #/AREA URNS HPF: PRESENT /HPF

## 2021-02-03 PROCEDURE — 99309 SBSQ NF CARE MODERATE MDM 30: CPT | Performed by: NURSE PRACTITIONER

## 2021-02-03 NOTE — PROGRESS NOTES
[facility]  [place] Medical Record Number:  [id number]  Place of Service where encounter took place:  [facility] [[id number]]  Chief Complaint   Patient presents with     RECHECK       HPI:    Maricarmen Lovell  is a 86 year old (4/8/1934), who is being seen today for an episodic care visit.  HPI information obtained from: facility chart records, facility staff, patient report and [facility] chart review.     Today's concern is:  Urinary tract infection symptoms  Dysuria  Today staff report urinary urgency, discomfort and unable to void. Straight cath for 150 ml of very cloudy and strong urine. Sent UA/UC and UA is positive. No fevers. BP range 121-148/66-86 and sats 95% on room air. Estimated Creatinine Clearance: 63.7 mL/min (based on SCr of 0.93 mg/dL).     Closed fracture of neck of left femur with routine healing, subsequent encounter  Physical deconditioning  Late onset Alzheimer's disease without behavioral disturbance (H)  Acute on chronic issues, plan is to likely discharge back to [facility] next week - due to weakness wheelchair is recommended (see F2F documentation below).     Past Medical and Surgical History reviewed in Epic today.    MEDICATIONS:  Current Outpatient Medications   Medication Sig Dispense Refill     acetaminophen (TYLENOL) 500 MG tablet Take 1,000 mg by mouth 3 times daily And 1000mg daily PRN, max 4gm/day       atorvastatin (LIPITOR) 20 MG tablet Take 1 tablet (20 mg) by mouth every evening       hypromellose (ARTIFICIAL TEARS) 0.5 % SOLN ophthalmic solution Place 2 drops into both eyes every 6 hours as needed for dry eyes        melatonin 3 MG tablet Take 3 mg by mouth At Bedtime        ondansetron (ZOFRAN-ODT) 4 MG ODT tab Take 1 tablet (4 mg) by mouth every 6 hours as needed for nausea or vomiting 5 tablet 0     oxyCODONE (ROXICODONE) 5 MG tablet Take 0.5 tablets (2.5 mg) by mouth every 4 hours as needed for severe pain 20 tablet 0     polyethylene  glycol (MIRALAX) 17 g packet Take 1 packet by mouth daily        QUEtiapine (SEROQUEL) 25 MG tablet Take 12.5 mg by mouth every evening       QUEtiapine (SEROQUEL) 25 MG tablet Take 25 mg by mouth 3 times daily as needed        rivaroxaban ANTICOAGULANT (XARELTO) 15 MG TABS tablet Take 15 mg by mouth every morning       STIMULANT LAXATIVE 8.6-50 MG tablet TAKE ONE TABLET BY MOUTH TWICE A DAY FOR CONSTIPATION (Patient taking differently: Take 1 tablet by mouth 2 times daily ) 100 tablet 0     venlafaxine (EFFEXOR-ER) 225 MG 24 hr tablet Take 225 mg by mouth every evening        vitamin D3 (CHOLECALCIFEROL) 50 mcg (2000 units) tablet Take 50 mcg by mouth every morning          REVIEW OF SYSTEMS:  Dysuria reported today    Objective:  /77   Pulse 73   Temp 96.9  F (36.1  C)   Resp 16   Wt 79 kg (174 lb 3.2 oz)   SpO2 95%   BMI 23.63 kg/m    Exam:  Exam is limited due to COVID-19 precautions  GENERAL APPEARANCE:  Alert, in no distress, cooperative  ENT:  Mouth normal, moist mucous membranes, normal hearing acuity  EYES:  Conjunctiva and lids normal  RESP:  no respiratory distress  NEURO:   No facial asymmetry, speech clear  PSYCH:  oriented X self, affect and mood normal     Labs:   Hospital Laboratory on 02/03/2021   Component Date Value Ref Range Status     Color Urine 02/03/2021 Yellow   Final     Appearance Urine 02/03/2021 Cloudy   Final     Glucose Urine 02/03/2021 Negative  NEG^Negative mg/dL Final     Bilirubin Urine 02/03/2021 Negative  NEG^Negative Final     Ketones Urine 02/03/2021 Negative  NEG^Negative mg/dL Final     Specific Gravity Urine 02/03/2021 1.020  1.003 - 1.035 Final     Blood Urine 02/03/2021 Trace* NEG^Negative Final     pH Urine 02/03/2021 6.0  5.0 - 7.0 pH Final     Protein Albumin Urine 02/03/2021 100* NEG^Negative mg/dL Final     Urobilinogen mg/dL 02/03/2021 0.0  0.0 - 2.0 mg/dL Final     Nitrite Urine 02/03/2021 Negative  NEG^Negative Final     Leukocyte Esterase Urine  2021 Large* NEG^Negative Final     Source 2021 Catheterized Urine   Final     RBC Urine 2021 11* 0 - 2 /HPF Final     WBC Urine 2021 >182* 0 - 5 /HPF Final     WBC Clumps 2021 Present* NEG^Negative /HPF Final     ASSESSMENT/PLAN:  Urinary tract infection symptoms  Dysuria  Acute onset. Start Cipro 500 mg PO BID x 5 days, f/u with final UC results.     Closed fracture of neck of left femur with routine healing, subsequent encounter  Physical deconditioning  Late onset Alzheimer's disease without behavioral disturbance (H)  Acute on chronic, WC recommended. See F2F documentation below.     Orders written by provider at facility  Cipro 500 mg PO BID x 5 days diagnosis urinary symptoms  Wheelchair    Electronically signed by:  NARESH Roche CNP       Face to Face and Medical Necessity Statement for DME Provider visit    Demographic Information on Maricarmen Lovell:  Gender: male  : 1934  [address] [place] [address]  [phone] (home)     Medical Record: [id number]  Social Security Number: [id number]  Primary Care Provider: Tyree Lyons Physician  Insurance: Payor: MEDICA / Plan: MEDICA [employer] NON/FV PARTNERS / Product Type: Indemnity /     HPI:   Maricarmen Lovell is a 86 year old  (1934), who is being seen today for a face to face provider visit at [facility]; medical necessity statement for DME included. This patient requires the following:  DME Ordered and Medical Necessity Statement     Wheelchair Documentation  Size: 18 x 18 and 18 inch seat height  Corresponding cushion: Yes: seat and back  Standard foot rests: No  Elevating leg rests: Yes  Arm rests: Yes: bilateral  Lap tray: No  Dose the patient use oxygen? No   Is the patient able to propel wheelchair? Yes If no why not? able And is there someone who can? YES caregivers available  1. The patient has mobility limitations that impairs their ability to participate in one or  more mobility related activities: Toileting, Feeding, Grooming and Bathing.  The wheelchair is suitable and necessary for use in the patient's home.  2. The patient's mobility limitations cannot be safely resolved by using a cane/walker:Yes    Reason why a cane or walker will not meet the patient's needs: impaired balance, impaired cognition  3. The patients home has adequate access to use a manual wheelchair:Yes  4. The use of a manual wheelchair on a regular basis will improve the patients ability to participate in mobility related ADL's at home:Yes  5. The patient is willing to use a manual wheelchair at home:Yes  6. The patient has adequate upper body strength and the mental capability to safely use a manual wheelchair and/or has a caregiver that is able to assist: Yes  7. Does the patient have a lower extremity injury or edema?Yes recent left hip fracture and repair  Reason for Type of Wheelchair  Wt is 174 lbs    Pt needing above DME with expected length of need of  99  months  due to medical necessity associated with following diagnosis:     Urinary tract infection symptoms  Dysuria  Physical deconditioning  Late onset Alzheimer's disease without behavioral disturbance (H)  Closed displaced fracture of base of neck of left femur with routine healing  Falling      PMH   has a past medical history of Alzheimer disease (H), Chronic a-fib (H), CKD (chronic kidney disease) stage 3, GFR 30-59 ml/min, COPD (chronic obstructive pulmonary disease) (H), Depression, Frequent falls, and HTN (hypertension).    ROS:4 point ROS including Respiratory, CV, GI and , other than that noted in the HPI,  is negative    EXAM  Vitals: /77   Pulse 73   Temp 96.9  F (36.1  C)   Resp 16   Wt 79 kg (174 lb 3.2 oz)   SpO2 95%   BMI 23.63 kg/m  ;BMI= Body mass index is 23.63 kg/m .  See progress note above for exam    ASSESSMENT/PLAN:  1. Urinary tract infection symptoms    2. Dysuria    3. Physical deconditioning    4. Late  onset Alzheimer's disease without behavioral disturbance (H)    5. Closed displaced fracture of base of neck of left femur with routine healing    6. Falling        Orders:  1. Facility staff/TC to contact [employer] company to get their order form for provider to fill out    ELECTRONICALLY SIGNED BY YANIV CERTIFIED PROVIDER:  NARESH Roche CNP   NPI [id number]   [facility]  [address] [place] [address], [address]  [place] [address]

## 2021-02-04 ENCOUNTER — HOSPITAL LABORATORY (OUTPATIENT)
Dept: OTHER | Facility: CLINIC | Age: 86
End: 2021-02-04

## 2021-02-04 LAB
LABORATORY COMMENT REPORT: NORMAL
SARS-COV-2 RNA RESP QL NAA+PROBE: NEGATIVE
SARS-COV-2 RNA RESP QL NAA+PROBE: NORMAL
SPECIMEN SOURCE: NORMAL
SPECIMEN SOURCE: NORMAL

## 2021-02-06 LAB
BACTERIA SPEC CULT: ABNORMAL
BACTERIA SPEC CULT: ABNORMAL
SPECIMEN SOURCE: ABNORMAL

## 2021-02-08 ENCOUNTER — DISCHARGE SUMMARY NURSING HOME (OUTPATIENT)
Dept: GERIATRICS | Facility: CLINIC | Age: 86
End: 2021-02-08
Payer: COMMERCIAL

## 2021-02-08 VITALS
WEIGHT: 174.2 LBS | BODY MASS INDEX: 23.63 KG/M2 | SYSTOLIC BLOOD PRESSURE: 114 MMHG | DIASTOLIC BLOOD PRESSURE: 72 MMHG | OXYGEN SATURATION: 97 % | TEMPERATURE: 98 F | HEART RATE: 77 BPM | RESPIRATION RATE: 20 BRPM

## 2021-02-08 DIAGNOSIS — E78.5 DYSLIPIDEMIA: ICD-10-CM

## 2021-02-08 DIAGNOSIS — R53.81 PHYSICAL DECONDITIONING: ICD-10-CM

## 2021-02-08 DIAGNOSIS — E55.9 VITAMIN D DEFICIENCY: ICD-10-CM

## 2021-02-08 DIAGNOSIS — R73.03 PREDIABETES: ICD-10-CM

## 2021-02-08 DIAGNOSIS — I48.21 PERMANENT ATRIAL FIBRILLATION (H): ICD-10-CM

## 2021-02-08 DIAGNOSIS — F02.80 LATE ONSET ALZHEIMER'S DISEASE WITHOUT BEHAVIORAL DISTURBANCE (H): ICD-10-CM

## 2021-02-08 DIAGNOSIS — R13.10 DYSPHAGIA, UNSPECIFIED TYPE: ICD-10-CM

## 2021-02-08 DIAGNOSIS — N39.0 URINARY TRACT INFECTION WITHOUT HEMATURIA, SITE UNSPECIFIED: ICD-10-CM

## 2021-02-08 DIAGNOSIS — R33.9 URINARY RETENTION: ICD-10-CM

## 2021-02-08 DIAGNOSIS — F32.A DEPRESSION, UNSPECIFIED DEPRESSION TYPE: ICD-10-CM

## 2021-02-08 DIAGNOSIS — J69.0 ASPIRATION PNEUMONIA OF RIGHT LUNG, UNSPECIFIED ASPIRATION PNEUMONIA TYPE, UNSPECIFIED PART OF LUNG (H): ICD-10-CM

## 2021-02-08 DIAGNOSIS — S72.002D CLOSED FRACTURE OF NECK OF LEFT FEMUR WITH ROUTINE HEALING, SUBSEQUENT ENCOUNTER: ICD-10-CM

## 2021-02-08 DIAGNOSIS — D72.829 LEUKOCYTOSIS, UNSPECIFIED TYPE: ICD-10-CM

## 2021-02-08 DIAGNOSIS — I10 ESSENTIAL HYPERTENSION: ICD-10-CM

## 2021-02-08 DIAGNOSIS — D62 ANEMIA DUE TO BLOOD LOSS, ACUTE: ICD-10-CM

## 2021-02-08 DIAGNOSIS — N18.31 STAGE 3A CHRONIC KIDNEY DISEASE (H): ICD-10-CM
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
## 2021-02-08 DIAGNOSIS — J43.9 PULMONARY EMPHYSEMA, UNSPECIFIED EMPHYSEMA TYPE (H): ICD-10-CM

## 2021-02-08 DIAGNOSIS — G30.1 LATE ONSET ALZHEIMER'S DISEASE WITHOUT BEHAVIORAL DISTURBANCE (H): ICD-10-CM

## 2021-02-08 PROCEDURE — 99316 NF DSCHRG MGMT 30 MIN+: CPT | Performed by: NURSE PRACTITIONER

## 2021-02-08 NOTE — PROGRESS NOTES
[facility] DISCHARGE SUMMARY  PATIENT'S NAME: Maricarmen Lovell  YOB: 1934  MEDICAL RECORD NUMBER:  [id number]  Place of Service where encounter took place:  [facility] [[id number]]    PRIMARY CARE PROVIDER AND CLINIC RESPONSIBLE AFTER TRANSFER:   [facility], [address] [place] [address]    Non-[facility] Provider     Transferring providers: NARESH Roche CNP, Dr. Abdullahi MD  Recent Hospitalization/ED:  [facility] Hospital stay 1/12/21 to 1/16/21.  Date of SNF Admission: January / 16 / 2021  Date of SNF (anticipated) Discharge: February / 09 / 2021  Discharged to: [facility]  Cognitive Scores: SLUMS: 21/30  Physical Function: Ambulating 15 ft with 2ww  DME: Walker    CODE STATUS/ADVANCE DIRECTIVES DISCUSSION:  DNR / DNI   ALLERGIES: Pecan [nuts], Fluoxetine, and Juglans    Per recent TCU provider progress notes:  86 year old male with PMHx of Alzheimer's dementia, HTN, HLD, permanent atrial fibrillation, CKD stage III, CVA, depression, emphysema, and impaired fasting glucose hospitalized with left hip pain after fall. X-ray of left hip: acute impacted and varus angulated fracture of the left femoral neck, now s/p left hip hemiarthroplasty 1/12/2021. Hgb 10.4 due to blood loss of surgery. Started on ASA for DVT prophylaxis. Developed aspiration pneumonia, treated with Unasyn with subsequent transition to Augmentin. SLP: started on dysphagia diet level 1 with nectar consistency.  TCU for therapies. Increased tylenol and decreased oxycodone at TCU.     On 1/24 c/o abdominal pain, hx bloody stools. Guaiac positive, UA/UC negative. WBC 11.7 on 1/25 -> 12.2 on 1/27. Hgb 10.8 on 1/27 -> 10.1 on 1/29 -> 11.5 last check 2/2. Last week treated for UTI, completed five day course of Cipro and symptoms resolved.     Patient seen for discharge visit. Known dementia - poor historian. Denies headaches,  dizziness, chest pain, dyspnea. Reports able to void. No constipation this week. Per EMR note BP range 104-122/67-78 and weight down 9 lbs since admission.Sats 97% on room air. Denies pain today. Therapies report walks 20ft with 2WW and CGA.     DISCHARGE DIAGNOSIS/NURSING FACILITY COURSE:   Fall, subsequent encounter  Left femoral neck fracture, status post left hemiarthroplasty  Physical deconditioning  Acute injury, now s/p repair. Pain managed adequately and up with walker, WBAT. Will stop PRN oxycodone. Continue Xarelto for DVT prophylaxis, f/u ortho as planned. Ready to discharge to memory care on 2/9 with home care.     Dysphagia  Right-sided aspiration pneumonia  Acute, improved with completed course of Augmentin. Monitor RESP status and staff to update with any deterioration. ST ordered at discharge.     Acute blood loss anemia  BRBPR  Acute with surgery, also noted BRBPR after a bowel movement two weeks ago - none reported since. Did have one positive guaiac but no dark stool or abdominal pain, nausea. Did complain of constipation so ?blood due to straining. F/U Hgb last check improving - monitor for any s/s bleeding.     Permanent atrial fibrillation  Chronic and rate controlled, on AC. Monitor.      Hypertension  Hx of HTN, currently off meds. Monitor vs per routine and f/u with ranges next visit.      CKD stage IIIa  Chronic with baseline creatinine 1-1.1, 0.93 last check. Avoid nephrotoxins and monitor BMP PRN.      Pulmonary emphysema  No symptoms, on room air - monitor.      Dyslipidemia  Chronic. Continue statin as PTA.      Depression  Alzheimer's disease  Chronic, continue venlafaxine  mg p.o. daily and Seroquel 12.5 mg p.o. every evening. Monitor for safety.     Prediabetes  Diet controlled. Monitor BG PRN    Vitamin D deficiency  Continue PTA vitamin D 2000 units p.o. daily    Leukocytosis  Acute - noted minimally increased WBC last check, treated for UTI and WBC back to normal range 7.5  on 2/2/21.   Lab Results   Component Value Date    WBC 7.5 02/02/2021    WBC 12.2 01/27/2021     Urinary retention  UTI  Appears resolved - monitor.     Past Medical History:  has a past medical history of Alzheimer disease (H), Chronic a-fib (H), CKD (chronic kidney disease) stage 3, GFR 30-59 ml/min, COPD (chronic obstructive pulmonary disease) (H), Depression, Frequent falls, and HTN (hypertension).    Discharge Medications:  Current Outpatient Medications   Medication Sig Dispense Refill     acetaminophen (TYLENOL) 500 MG tablet Take 1,000 mg by mouth 3 times daily And 1000mg daily PRN, max 4gm/day       atorvastatin (LIPITOR) 20 MG tablet Take 1 tablet (20 mg) by mouth every evening       hypromellose (ARTIFICIAL TEARS) 0.5 % SOLN ophthalmic solution Place 2 drops into both eyes every 6 hours as needed for dry eyes        melatonin 3 MG tablet Take 3 mg by mouth At Bedtime        polyethylene glycol (MIRALAX) 17 g packet Take 1 packet by mouth daily        QUEtiapine (SEROQUEL) 25 MG tablet Take 12.5 mg by mouth every evening       rivaroxaban ANTICOAGULANT (XARELTO) 15 MG TABS tablet Take 15 mg by mouth every morning       STIMULANT LAXATIVE 8.6-50 MG tablet TAKE ONE TABLET BY MOUTH TWICE A DAY FOR CONSTIPATION (Patient taking differently: Take 1 tablet by mouth 2 times daily ) 100 tablet 0     venlafaxine (EFFEXOR-ER) 225 MG 24 hr tablet Take 225 mg by mouth every evening        vitamin D3 (CHOLECALCIFEROL) 50 mcg (2000 units) tablet Take 50 mcg by mouth every morning          Medication Changes/Rationale:     Stopped PRN oxycodone, Zofran, PRN Seroquel    Increased Miralax due to constipation    Stopped psyllium    Scheduled tylenol for pain    Controlled medications sent with patient:   not applicable/none     ROS:   4 point ROS including Respiratory, CV, GI and , other than that noted in the HPI,  is negative    Physical Exam:   Vitals: /72   Pulse 77   Temp 98  F (36.7  C)   Resp 20   Wt 79 kg  (174 lb 3.2 oz)   SpO2 97%   BMI 23.63 kg/m    BMI= Body mass index is 23.63 kg/m .  Exam is limited due to COVID-19 precautions  GENERAL APPEARANCE:  Alert, in no distress, cooperative  ENT:  Mouth normal, moist mucous membranes, normal hearing acuity  EYES:  Conjunctiva and lids normal  RESP:  no respiratory distress  NEURO:   No facial asymmetry, speech clear  PSYCH:  oriented X self, affect and mood normal     SNF labs:   Most Recent 3 CBC's:  Recent Labs   Lab Test 02/02/21  0641 01/29/21  0523 01/27/21  0510 01/25/21  0540   WBC 7.5  --  12.2* 11.7*   HGB 11.5* 10.1* 10.8* 10.6*   MCV 98  --  104* 103*   *  --  442 425     Most Recent 3 BMP's:  Recent Labs   Lab Test 01/25/21  0540 01/20/21  0729 01/15/21  0942 01/14/21  0826    140  --  142   POTASSIUM 3.9 4.0  --  4.0   CHLORIDE 107 106  --  111*   CO2 28 29  --  31   BUN 16 20  --  21   CR 0.93 0.86  --  0.85   ANIONGAP 6 5  --  <1*   BEATRIZ 8.8 8.3*  --  8.3*   GLC 91 90 102* 155*       DISCHARGE PLAN:    Follow up labs: No labs orders/due    Medical Follow Up:      Follow up with primary care provider in 2-3 weeks  Follow up with specialist ortho as scheduled 2/23     [facility] referral needed and placed by this provider: No    Current [place] scheduled appointments:   ,  Future Appointments   Date Time Provider Department Center   3/25/2021  1:00 PM Nella Gutiérrez CNP [facility]       Discharge Services: Home Care:  Occupational Therapy, Physical Therapy, Speech Therapy , Registered Nurse, Home Health Aide and From:  [facility]    Discharge Instructions Verbalized to Patient at Discharge:     None    TOTAL DISCHARGE TIME:   Greater than 30 minutes  Electronically signed by:  NARESH Roche CNP     Documentation of Face to Face and Certification for Home Health Services    I certify that patient: Maricarmen Lovell is under my care and that I, or a nurse practitioner or physician's assistant working with me, had a face-to-face  encounter that meets the physician face-to-face encounter requirements with this patient on: 2/8/2021.    This encounter with the patient was in whole, or in part, for the following medical condition, which is the primary reason for home health care: weakness with hip fracture and repair.    I certify that, based on my findings, the following services are medically necessary home health services: Nursing, Occupational Therapy, Physical Therapy, Speech Language Therapy and HHA.    My clinical findings support the need for the above services because: Nurse is needed: To assess status, vs, pain after changes in medications or other medical regimen, Occupational Therapy Services are needed to assess and treat cognitive ability and address ADL safety due to impairment in self care ability, Physical Therapy Services are needed to assess and treat the following functional impairments: weakness and debility and HHA for bathing.  ST needed due to swallowing issues    Further, I certify that my clinical findings support that this patient is homebound (i.e. absences from home require considerable and taxing effort and are for medical reasons or Oriental orthodox services or infrequently or of short duration when for other reasons) because: Requires assistance of another person or specialized equipment to access medical services because patient: Is unable to exit home safely on own due to: weakness, pain, cognitive impairment...    Based on the above findings. I certify that this patient is confined to the home and needs intermittent skilled nursing care, physical therapy and/or speech therapy.  The patient is under my care, and I have initiated the establishment of the plan of care.  This patient will be followed by a physician who will periodically review the plan of care.  Physician/Provider to provide follow up care: Tyree Lyons Physician    Attending hospital physician (the Medicare certified YANIV provider): Jeanna HUYNH  NARESH Han CNP  Physician Signature: See electronic signature associated with these discharge orders.  Date: 2/8/2021

## 2021-02-09 ENCOUNTER — HOSPITAL LABORATORY (OUTPATIENT)
Dept: OTHER | Facility: CLINIC | Age: 86
End: 2021-02-09

## 2021-02-10 LAB
SARS-COV-2 RNA RESP QL NAA+PROBE: NOT DETECTED
SPECIMEN SOURCE: NORMAL

## 2021-03-09 ENCOUNTER — HOSPITAL ENCOUNTER (OUTPATIENT)
Dept: MRI IMAGING | Facility: CLINIC | Age: 86
Discharge: HOME | End: 2021-03-09
Attending: PHYSICIAN ASSISTANT | Admitting: PHYSICIAN ASSISTANT
Payer: COMMERCIAL

## 2021-03-09 DIAGNOSIS — I62.9 INTRACRANIAL HEMORRHAGE (H): ICD-10-CM

## 2021-03-09 LAB
CREAT BLD-MCNC: 1 MG/DL (ref 0.66–1.25)
GFR SERPL CREATININE-BSD FRML MDRD: 71 ML/MIN/{1.73_M2}

## 2021-03-09 PROCEDURE — 70553 MRI BRAIN STEM W/O & W/DYE: CPT

## 2021-03-09 PROCEDURE — 255N000002 HC RX 255 OP 636: Performed by: PHYSICIAN ASSISTANT

## 2021-03-09 PROCEDURE — 82565 ASSAY OF CREATININE: CPT

## 2021-03-09 PROCEDURE — A9585 GADOBUTROL INJECTION: HCPCS | Performed by: PHYSICIAN ASSISTANT

## 2021-03-09 RX ORDER — GADOBUTROL 604.72 MG/ML
8 INJECTION INTRAVENOUS ONCE
Status: COMPLETED | OUTPATIENT
Start: 2021-03-09 | End: 2021-03-09

## 2021-03-09 RX ADMIN — GADOBUTROL 8 ML: 604.72 INJECTION INTRAVENOUS at 09:49

## 2021-03-17 NOTE — RESULT ENCOUNTER NOTE
Repeat MRI reviewed. There appears to be areas with new/expanded hemosiderin staining. Given these new findings will discuss continuation of AC with pt who has a scheduled appointment on 3/25/2021

## 2021-04-12 ENCOUNTER — CARE COORDINATION (OUTPATIENT)
Dept: NEUROLOGY | Facility: CLINIC | Age: 86
End: 2021-04-12

## 2021-04-12 NOTE — PROGRESS NOTES
Chart review by Stroke RNCC. Stroke team identified pt as potential candidate for watchman procedure in process of scheduling next follow-up appt.     Pt was scheduled for video visit with stroke THOR on 3/25, but was unable to participate in visit. Per nursing staff at [facility] facility, pt would best benefit from in-person visit. Stroke provider had intended to address MRI results at this visit which was unable to occur and pt has not yet been rescheduled.    Per review of medications, pt had been recommended to be placed back on 20mg xarelto daily for stroke prevention 11/18/2020, unclear if a rx had been sent on this. It appears the pt was then hospitalized 1/12/2021 for L femoral fx and discharged on 15mg xarelto daily per hospitalist resuming PTA order for afib in discharge summary? Then on 2/8/2021, pt transitioned from TCU to memory care where xarelto 15mg daily is noted for DVT prophylaxis.     Routing to stroke team to inquire where to go moving forward in scheduling pt for stroke follow-up.     Oneida HUSSEIN, RN, SCRN  RN Stroke Neurology Care Coordinator  [facility] Neuroscience Service Line  Phone: [phone]  Email: [email or web address]

## 2021-04-13 NOTE — PROGRESS NOTES
Per recommendation from Dr. Robin. Pt would likely be best seen through general neurology within the next month for stroke f/u visit.    RNCC contacted [facility] Memory Care unit. LVM for pt's RN Olive stating that I'd like to confirm a new appointment for one of her residents. Callback number given.    Pt is scheduled with Dr. Chappell in [place] for 4/29/21 at 2:00pm.    Oneida Rosenthal BS, RN, SCRN  RN Stroke Neurology Care Coordinator  [facility] Neuroscience Service Line  Phone: [phone]  Email: [email or web address]

## 2021-04-14 ENCOUNTER — TELEPHONE (OUTPATIENT)
Dept: NEUROLOGY | Facility: CLINIC | Age: 86
End: 2021-04-14

## 2021-04-14 NOTE — PROGRESS NOTES
M for pt's nurse, Olive. Stated that pt is scheduled for neurology appt 4/29 at 2pm. Callback number provided.    Oneida HUSSEIN, RN, SCRN  RN Stroke Neurology Care Coordinator  [facility] Neuroscience Service Line  Phone: [phone]  Email: [email or web address]

## 2021-04-14 NOTE — TELEPHONE ENCOUNTER
Patient has a clinic appt on 4-29-21.  Looking to see if family is able to attend the visit, as we need someone to assist. Also here is paperwork of medical history that needs to be completed. Notified that we can do the paperwork prior to the visit, and to come 15-20 minutes early.

## 2021-04-14 NOTE — PROGRESS NOTES
Spoke to Davis Mcmanus RN at [facility] and confirmed the information of pts upcoming neurology appt. 4/29/21 at 2pm at [address]. Clinic number given if there is conflict and the appointment needs to be rescheduled ([phone]).    Oneida Rosenthal BS, RN, SCRN  RN Stroke Neurology Care Coordinator  [facility] Neuroscience Service Line  Phone: [phone]  Email: [email or web address]

## 2021-04-29 ENCOUNTER — OFFICE VISIT (OUTPATIENT)
Dept: NEUROLOGY | Facility: CLINIC | Age: 86
End: 2021-04-29
Attending: PSYCHIATRY & NEUROLOGY
Payer: COMMERCIAL

## 2021-04-29 VITALS
BODY MASS INDEX: 25.6 KG/M2 | WEIGHT: 189 LBS | HEART RATE: 70 BPM | OXYGEN SATURATION: 93 % | SYSTOLIC BLOOD PRESSURE: 128 MMHG | DIASTOLIC BLOOD PRESSURE: 83 MMHG | TEMPERATURE: 98.1 F | HEIGHT: 72 IN

## 2021-04-29 DIAGNOSIS — Z86.79 HISTORY OF INTRACRANIAL HEMORRHAGE: ICD-10-CM

## 2021-04-29 DIAGNOSIS — Z86.73 HISTORY OF STROKE: Primary | ICD-10-CM

## 2021-04-29 PROCEDURE — G0463 HOSPITAL OUTPT CLINIC VISIT: HCPCS

## 2021-04-29 PROCEDURE — 99215 OFFICE O/P EST HI 40 MIN: CPT | Performed by: PSYCHIATRY & NEUROLOGY

## 2021-04-29 ASSESSMENT — MIFFLIN-ST. JEOR: SCORE: 1570.3

## 2021-04-29 NOTE — LETTER
4/29/2021         RE: Maricarmen Lovell  [address]  [facility] [address]        Dear Colleague,    Thank you for referring your patient, Maricarmen Lovell, to the [facility] [place]. Please see a copy of my visit note below.    ESTABLISHED PATIENT NEUROLOGY NOTE    DATE OF VISIT: 4/29/2021  CLINIC LOCATION: [facility]  MRN: [id number]  PATIENT NAME: Maricarmen Lovell  YOB: 1934    REASON FOR VISIT:   Chief Complaint   Patient presents with     Stroke     follow up from hospital stay     SUBJECTIVE:                                                      HISTORY OF PRESENT ILLNESS: Patient is new to me, but was previously seen by stroke neurology team.  History and prior evaluation are briefly summarized below, but please see previous neurology notes for more detailed information.    Per chart review, the patient has history of Alzheimer's disease, depression, chronic kidney disease, stage III, chronic atrial fibrillation, COPD, and hyperlipidemia.  On 9/16/2020 he sustained a mechanical fall and was found to have intraparenchymal hematoma in the right basal ganglia region.  Was evaluated by stroke neurology team.    Initial head CT from the same day demonstrated small acute intraparenchymal hematoma within the right basal ganglia/prefrontal region inferior to the caudate nucleus without significant mass-effect.  In addition, volume loss, chronic small vessel ischemic changes, and old left parietal infarct were seen.  It was stable on repeat scan.  Head CT was normal.    Brain MRI from 9/17/2020 demonstrated subacute intraparenchymal hemorrhage in the anterior right basal ganglia without definite underlying enhancement in addition to several areas suggestive of previous intraparenchymal hemorrhages and subarachnoid hemorrhage in the left parietal lobe.  The latter findings raised the concern for possibility of cerebral amyloid angiopathy.  Moderate  diffuse parenchymal volume loss and white matter changes likely due to chronic microvascular ischemic disease were also seen.  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    Repeat brain MRI from 3/9/2021 demonstrated tiny recent ischemic infarcts in the cortex of high anterior frontal lobes bilaterally.  No abnormal contrast enhancement was seen.  No new areas of cerebral bleeding.  No underlying lesion at the site of prior hemorrhage in the right basal ganglia.    Additional hospital testing included video EEG monitoring from 9/19/2020 that demonstrated moderate diffuse encephalopathy without other abnormal findings.  He was also started on 20 mg of Lipitor to treat his elevated LDL of 125.  He has virtual follow-up in November 2020.  At that time he was advised to restart Xarelto at 15 mg daily, and repeat brain MRI was ordered.    At the present time, the patient reports no new focal neurological symptoms.  I was able to speak with staff from his assisted living ([facility]), PRASHANTH Mcmanus, who told me that the patient basically returned to the baseline of his cognitive function at the present time.  No new focal deficits were seen.  Compliant with atorvastatin and Xarelto.  Walks with a walker.  No additional falls.  He provided me a contact number of patient's power of  (patient's sister, ANNA Arredondo), but warned me that due to complicated family dynamics she is difficult to reach (I left a voicemail on the provided number of [phone]).    On review of systems, patient endorses no additional active complaints. Medications, allergies, family and social history were also reviewed. There are no changes reported by patient.  REVIEW OF SYSTEMS:                                                    10-system review was completed. Pertinent positives are included in HPI. The  remainder of ROS is negative.  EXAM:                                                    Physical Exam:   Vitals: /83 (BP Location: Left arm, Patient Position: Sitting, Cuff Size: Adult Regular)   Pulse 70   Temp 98.1  F (36.7  C) (Oral)   Ht 1.829 m (6')   Wt 85.7 kg (189 lb)   SpO2 93%   BMI 25.63 kg/m      General: pt is in NAD, cooperative.  Skin: normal turgor, moist mucous membranes, no lesions/rashes noticed.  HEENT: ATNC, white sclera, normal conjunctiva.  Respiratory: Symmetric lung excursion, no accessory respiratory muscle use.  Abdomen: Non distended.  Neurological: awake, cooperative, follows commands, no aphasia or dysarthria noted, cranial nerves II-XII: Funduscopic exam is intact bilaterally, no ptosis, extraocular motility is full, face is symmetric, tongue is midline, equally moves all extremities, pronator drift is negative, he has intermittent mild bilateral resting hand tremor and slightly increased tone in both upper extremities with provoking maneuvers, vibratory and pinprick sensation is intact bilaterally, finger to nose intact bilaterally, presented in wheelchair, gait was not checked today.  ASSESSMENT AND PLAN:                                                    Assessment: 87-year-old male patient with history of recurrent strokes and intraparenchymal hemorrhage presents for follow-up.  His brain MRI from March 2021 demonstrated additional areas of recent small ischemic infarcts in both frontal lobes while he is on Xarelto.  No underlying lesion was seen at the site of the previous hemorrhage.  I reviewed recent MRI findings with the patient and his assisted living staff and discussed clinical conundrum that we are having.  The patient continues to have small ischemic infarcts while on Xarelto, but also had intraparenchymal hemorrhage in September 2020.  Previously, a consideration to stop anticoagulation was raised, but in light of recent tiny ischemic strokes, I would be  inclined to continue it and consider increasing dose of Xarelto to 20 mg daily if he has additional strokes.  I think he would need to follow-up with one of the stroke specialists in 3 to 6 months from now to help us decide the long-term plan regarding anticoagulation, but it certainly should be continued at least for the next several months.    Diagnoses:    ICD-10-CM    1. History of stroke  Z86.73    2. History of intracranial hemorrhage  Z86.79      Plan: At today's visit we thoroughly discussed current symptoms, available treatment options, and the plan.    Symptoms and signs of stroke were discussed.  The patient and his assisted living staff were advised to call 911 with any SUDDEN onset of weakness, vision loss, speech problems, numbness, or severe headache of unknown cause.    I also recommended to avoid falls and additional head injuries, as they could lead for more extensive cerebral bleeding.    For secondary stroke prevention, I counseled the patient to:  -Continue current dose of Xarelto and atorvastatin  -Long-term blood pressure goal is less than 120/80  -Long-term LDL goal is 40-70  -Long-term goal of hemoglobin A1C is less than 7.0  -Low-salt low-fat diet  -Regular aerobic exercise 30 minutes 3-4 times per week    Next follow-up appointment is in the next 3-6 months with Dr. Robin or earlier if needed.    Total Time: 54 minutes spent on the date of the encounter doing chart review, history and exam, documentation and further activities per the note.    Rudy Chappell MD  [facility] Neurology  (Chart documentation was completed in part with Dragon voice-recognition software. Even though reviewed, some grammatical, spelling, and word errors may remain.)      Again, thank you for allowing me to participate in the care of your patient.        Sincerely,        Rudy Chappell MD

## 2021-04-29 NOTE — PROGRESS NOTES
ESTABLISHED PATIENT NEUROLOGY NOTE    DATE OF VISIT: 4/29/2021  CLINIC LOCATION: [facility]  MRN: [id number]  PATIENT NAME: Maricarmen Lovell  YOB: 1934    REASON FOR VISIT:   Chief Complaint   Patient presents with     Stroke     follow up from hospital stay     SUBJECTIVE:                                                      HISTORY OF PRESENT ILLNESS: Patient is new to me, but was previously seen by stroke neurology team.  History and prior evaluation are briefly summarized below, but please see previous neurology notes for more detailed information.    Per chart review, the patient has history of Alzheimer's disease, depression, chronic kidney disease, stage III, chronic atrial fibrillation, COPD, and hyperlipidemia.  On 9/16/2020 he sustained a mechanical fall and was found to have intraparenchymal hematoma in the right basal ganglia region.  Was evaluated by stroke neurology team.    Initial head CT from the same day demonstrated small acute intraparenchymal hematoma within the right basal ganglia/prefrontal region inferior to the caudate nucleus without significant mass-effect.  In addition, volume loss, chronic small vessel ischemic changes, and old left parietal infarct were seen.  It was stable on repeat scan.  Head CT was normal.    Brain MRI from 9/17/2020 demonstrated subacute intraparenchymal hemorrhage in the anterior right basal ganglia without definite underlying enhancement in addition to several areas suggestive of previous intraparenchymal hemorrhages and subarachnoid hemorrhage in the left parietal lobe.  The latter findings raised the concern for possibility of cerebral amyloid angiopathy.  Moderate diffuse parenchymal volume loss and white matter changes likely due to chronic microvascular ischemic disease were also seen.  Small extra-axial enhancing 5 mm lesion along the right clinoid process, most compatible with meningioma and developmental venous  anomaly in the right frontal lobe were noted.    Repeat brain MRI from 3/9/2021 demonstrated tiny recent ischemic infarcts in the cortex of high anterior frontal lobes bilaterally.  No abnormal contrast enhancement was seen.  No new areas of cerebral bleeding.  No underlying lesion at the site of prior hemorrhage in the right basal ganglia.    Additional hospital testing included video EEG monitoring from 9/19/2020 that demonstrated moderate diffuse encephalopathy without other abnormal findings.  He was also started on 20 mg of Lipitor to treat his elevated LDL of 125.  He has virtual follow-up in November 2020.  At that time he was advised to restart Xarelto at 15 mg daily, and repeat brain MRI was ordered.    At the present time, the patient reports no new focal neurological symptoms.  I was able to speak with staff from his assisted living ([facility]), PRASHANTH Mcmanus, who told me that the patient basically returned to the baseline of his cognitive function at the present time.  No new focal deficits were seen.  Compliant with atorvastatin and Xarelto.  Walks with a walker.  No additional falls.  He provided me a contact number of patient's power of  (patient's sister, ANNA Arredondo), but warned me that due to complicated family dynamics she is difficult to reach (I left a voicemail on the provided number of [phone]).    On review of systems, patient endorses no additional active complaints. Medications, allergies, family and social history were also reviewed. There are no changes reported by patient.  REVIEW OF SYSTEMS:                                                    10-system review was completed. Pertinent positives are included in HPI. The remainder of ROS is negative.  EXAM:                                                    Physical Exam:   Vitals: /83 (BP Location: Left arm, Patient Position: Sitting, Cuff Size: Adult Regular)   Pulse 70   Temp 98.1  F (36.7  C) (Oral)   Ht 1.829 m  (6')   Wt 85.7 kg (189 lb)   SpO2 93%   BMI 25.63 kg/m      General: pt is in NAD, cooperative.  Skin: normal turgor, moist mucous membranes, no lesions/rashes noticed.  HEENT: ATNC, white sclera, normal conjunctiva.  Respiratory: Symmetric lung excursion, no accessory respiratory muscle use.  Abdomen: Non distended.  Neurological: awake, cooperative, follows commands, no aphasia or dysarthria noted, cranial nerves II-XII: Funduscopic exam is intact bilaterally, no ptosis, extraocular motility is full, face is symmetric, tongue is midline, equally moves all extremities, pronator drift is negative, he has intermittent mild bilateral resting hand tremor and slightly increased tone in both upper extremities with provoking maneuvers, vibratory and pinprick sensation is intact bilaterally, finger to nose intact bilaterally, presented in wheelchair, gait was not checked today.  ASSESSMENT AND PLAN:                                                    Assessment: 87-year-old male patient with history of recurrent strokes and intraparenchymal hemorrhage presents for follow-up.  His brain MRI from March 2021 demonstrated additional areas of recent small ischemic infarcts in both frontal lobes while he is on Xarelto.  No underlying lesion was seen at the site of the previous hemorrhage.  I reviewed recent MRI findings with the patient and his assisted living staff and discussed clinical conundrum that we are having.  The patient continues to have small ischemic infarcts while on Xarelto, but also had intraparenchymal hemorrhage in September 2020.  Previously, a consideration to stop anticoagulation was raised, but in light of recent tiny ischemic strokes, I would be inclined to continue it and consider increasing dose of Xarelto to 20 mg daily if he has additional strokes.  I think he would need to follow-up with one of the stroke specialists in 3 to 6 months from now to help us decide the long-term plan regarding  anticoagulation, but it certainly should be continued at least for the next several months.    Diagnoses:    ICD-10-CM    1. History of stroke  Z86.73    2. History of intracranial hemorrhage  Z86.79      Plan: At today's visit we thoroughly discussed current symptoms, available treatment options, and the plan.    Symptoms and signs of stroke were discussed.  The patient and his assisted living staff were advised to call 911 with any SUDDEN onset of weakness, vision loss, speech problems, numbness, or severe headache of unknown cause.    I also recommended to avoid falls and additional head injuries, as they could lead for more extensive cerebral bleeding.    For secondary stroke prevention, I counseled the patient to:  -Continue current dose of Xarelto and atorvastatin  -Long-term blood pressure goal is less than 120/80  -Long-term LDL goal is 40-70  -Long-term goal of hemoglobin A1C is less than 7.0  -Low-salt low-fat diet  -Regular aerobic exercise 30 minutes 3-4 times per week    Next follow-up appointment is in the next 3-6 months with Dr. Robin or earlier if needed.    Total Time: 54 minutes spent on the date of the encounter doing chart review, history and exam, documentation and further activities per the note.    Rudy Chappell MD  [facility] Neurology  (Chart documentation was completed in part with Dragon voice-recognition software. Even though reviewed, some grammatical, spelling, and word errors may remain.)

## 2021-04-29 NOTE — PATIENT INSTRUCTIONS
AFTER VISIT SUMMARY (AVS):    At today's visit we thoroughly discussed current symptoms, available treatment options, and the plan.    Symptoms and signs of stroke were discussed.  You should call 911 with any SUDDEN onset of weakness, vision loss, speech problems, numbness, or severe headache of unknown cause.    Please be careful to avoid falls and additional head injuries, as they could lead for more cerebral bleeding.    For secondary stroke prevention:  -Continue current dose of Xarelto and atorvastatin  -Long-term blood pressure goal is less than 120/80  -Long-term LDL goal is 40-70  -Long-term goal of hemoglobin A1C is less than 7.0  -Low-salt low-fat diet  -Regular aerobic exercise 30 minutes 3-4 times per week    Next follow-up appointment is in the next 3-6 months with Dr. Robin or earlier if needed.    Please do not hesitate to call me with any questions or concerns.    Thanks.

## 2021-11-17 NOTE — LETTER
"    11/18/2020         RE: Maricarmen Lovell  [address]  [facility] [address]        Dear Colleague,    Thank you for referring your patient, Maricarmen Lovell, to the [facility] NEUROSURGERY [facility]. Please see a copy of my visit note below.    Maricarmen Lovell is a 86 year old male who is being evaluated via a billable video visit.      The patient has been notified of following:     \"This video visit will be conducted via a call between you and your physician/provider. We have found that certain health care needs can be provided without the need for an in-person physical exam.  This service lets us provide the care you need with a video conversation.  If a prescription is necessary we can send it directly to your pharmacy.  If lab work is needed we can place an order for that and you can then stop by our lab to have the test done at a later time.    Video visits are billed at different rates depending on your insurance coverage.  Please reach out to your insurance provider with any questions.    If during the course of the call the physician/provider feels a video visit is not appropriate, you will not be charged for this service.\"    Patient has given verbal consent for Video visit? Yes  How would you like to obtain your AVS? Mail a copy  If you are dropped from the video visit, the video invite should be resent to: Text to cell phone: [phone]  Will anyone else be joining your video visit? No  {If patient encounters technical issues they should call [phone] :[phone]    Video-Visit Details    Type of service:  Video Visit    Video Start Time: 1300  Video End Time: 1322    Originating Location (pt. Location): Assisted Living    Distant Location (provider location):  [facility] NEUROSURGERY [facility]     Platform used for Video Visit: Jailene Leblanc PA-C    _____________________________________________________________      Chief Complaint: Patient presents with:  Neurologic " Problem: f/u stroke      History of Present Illness: Maricarmen Lovell is a 86 year old male presenting for follow-up for [facility]. He was hospitalized at [facility] from 09/16 -09/21/2020. Prior to the hospital stay, he had a past medical history of h/o afib on Xarelto, prior strokes, memory issues, Alzheimer's disease, HLD who presented on 9/16/2020 after a mechanical fall at home. He was brought to the emergency department where a head CT revealed small right basal ganglia hemorrhage.  Mr. Lovell received PCC for anticoagulation reversal.  Repeat scan showed stability without growth of bleed. CT scan showed no vascular abnormality or obvious underlying lesion. MRI brain was ordered, revealing multiple regions of prior small intraparenchymal hemorrhages and left parietal subarachnoid hemorrhage suggestive of underlying cerebral amyloid angiopathy. During his hospital stay an EEG was ordered for worsening confusion, but showed no evidence of seizures. Given the MRI findings, it was recommended to perhaps stop anticoagulation permanently. My colleague attempted to reach family to discuss this recommendation, but was u unable to reach them.  Mr. Lovell potentially could be a candidate for a watchman device to prevent further stroke given his history of atrial fibrillation and new contraindication to anticoagulation, but given history of dementia and other factors, it makes this options unlikely. It was recommended that Mr. Lovell undergo a repeat MRI with and without contrast as an outpatient in 2 to 3 months.  Mr. Lovell was started on Lipitor 20 mg as treatment for his LDL which was 125, with target LDL goal 40-70.  Since being discharged, He reports he is doing well, currently still not taking Xarelto as recommended at discharge. He appears he has not yet completed the repeat MRI due to the pandemic. Mr. Lovell states that he does not recall the details of the mechanical fall leading to his hospital  admission. He states he only remembers a previous fall which did not lead to a hospital admission.  He reports after the first fall, he was examined and cleared by medical provider. Mr. Lovell denies any current headache, focal weakness, numbness, or change in sensations at this time.  He reports history of intermittent mild headaches, that he reports are alleviated by use of medication.  He reports he uses aspirin and Aleve, but mr. Coronado's nurse was in the room who states he has Tylenol on his medication list. She states there is no aspirin or Aleve on his medication list.    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 Less likely hypertensive or traumatic.    Problem List Items Addressed This Visit        Other    Intracranial hemorrhage (H) - Primary        Plan:   - Recommend restarting prior to admission Xarelto 20 mg daily for medical management of atrial fibrillation. Discussed risk and benefits of re-initiating versus discontinuation permanently of anticoagulation with stroke attending ; Unable to reach family to discuss these risks and benefits of reinitiating anticoagulation medication given concern of cerebral amyloid angiopathy.  If any questions regarding medication reinitiation, please contact stroke clinic.  - Recommend repeat MRI brain with and without contrast, given current circumstances with increased cases of COVID-19, imaging does not need to be completed ASAP, but consider completing within the next 3 months if able. Orders placed  -  Most recent charted LDL (125) with LDL goal 40-70 ; continue Lipitor 20 mg daily, recheck LDL at next primary care provider appointment, titrate medication as needed to reach target goal LDL  - Goal BP <130/80 with tighter control associated with decreased overall CV risk, if tolerated  - Follow up in 3 months with [facility]     Stroke  "Education provided.  He will call us with any questions.  For any acute neurologic deficits he was advised to  go directly to the hospital rather than call the clinic.    Edel Leblanc PA-C  Neurology  11/18/2020 12:58 PM  To page me or covering stroke neurology team member, click here: [facility]  Choose \"On Call\" tab at top, then search dropdown box for \"Neurology Adult\" & press Enter, look for Neuro ICU/Stroke    ___________________________________________________________________    Current Medications  Current Outpatient Medications   Medication Sig     atorvastatin (LIPITOR) 20 MG tablet Take 1 tablet (20 mg) by mouth every evening     hypromellose (ARTIFICIAL TEARS) 0.5 % SOLN ophthalmic solution Place 1 drop into both eyes every hour as needed for dry eyes     melatonin 3 MG tablet Take 1 mg by mouth At Bedtime     venlafaxine (EFFEXOR-ER) 225 MG 24 hr tablet Take 225 mg by mouth daily     vitamin D3 (CHOLECALCIFEROL) 50 mcg (2000 units) tablet Take 1 tablet by mouth daily     acetaminophen (TYLENOL) 325 MG tablet Take 650 mg by mouth every 4 hours as needed for mild pain     polyethylene glycol (MIRALAX) 17 g packet Take 1 packet by mouth daily as needed for constipation     psyllium (METAMUCIL/KONSYL) Packet Take 1 packet by mouth daily as needed for constipation     senna-docusate (SENNA S) 8.6-50 MG tablet Take 1 tablet by mouth 2 times daily     No current facility-administered medications for this visit.        Past Medical History  Past Medical History:   Diagnosis Date     Alzheimer disease (H)      Chronic a-fib (H)      CKD (chronic kidney disease) stage 3, GFR 30-59 ml/min      COPD (chronic obstructive pulmonary disease) (H)      Depression      Frequent falls      HTN (hypertension)        Social History  Social History     Tobacco Use     Smoking status: Former Smoker     Smokeless tobacco: Never Used   Substance Use Topics     Alcohol use: Never     Frequency: Never     Drug use: Never       Family " History  No family history on file.    ROS: 10 point relevant ROS neg other than the symptoms noted above in the HPI.    Physical Exam    Ht 6' (1.829 m)   Wt 200 lb (90.7 kg)   BMI 27.12 kg/m      General:  no acute distress  HEENT:  normocephalic/atraumatic  Pulmonary:  no respiratory distress    Neurologic   Mental Status:  alert, oriented x 3, follows commands, speech clear and fluent,  Cranial Nerves:  EOMI with normal smooth pursuit, facial movements symmetric, hearing not formally tested but intact to conversation, no dysarthria  Motor: no abnormal movements, able to move all limbs antigravity spontaneously with no signs of hemiparesis observed, no pronator drift  Reflexes: unable to test (telestroke)  Sensory: unable to test (telestroke)  Coordination: no obvious ataxia   Station/Gait:  unable to test (telestroke)    Neuroimaging: as per HPI. I personally reviewed those images    Labs:    Recent Labs   Lab Test 09/17/20  0431 09/16/20  1511 09/16/20  1014   INR 1.24* 1.74* 1.59*        Recent Labs   Lab Test 09/18/20  1057   CHOL 180   HDL 40   *   TRIG 74       No lab results found.    No lab results found.        Again, thank you for allowing me to participate in the care of your patient.        Sincerely,        Edel Leblanc PA-C     1) Please take ibuprofen and/or Tylenol as needed for pain as prescribed.  2) Nothing in the vagina for 6 weeks (including no sex, no tampons, and no douching).  3) Please call your doctor for a follow up your postpartum appointment in 4 weeks.  4) Please continue taking vitamins postpartum. Take iron and colace for acute blood loss anemia.  5) Please call the office sooner if you have heavy vaginal bleeding, severe abdominal pain, or fever > 100.4F.  6) You may resume regular daily activity as tolerated

## 2022-01-01 ENCOUNTER — APPOINTMENT (OUTPATIENT)
Dept: GENERAL RADIOLOGY | Facility: CLINIC | Age: 87
DRG: 963 | End: 2022-01-01
Attending: EMERGENCY MEDICINE
Payer: COMMERCIAL

## 2022-01-01 ENCOUNTER — HOSPITAL ENCOUNTER (INPATIENT)
Facility: CLINIC | Age: 87
LOS: 5 days | DRG: 871 | End: 2023-01-05
Attending: HOSPITALIST | Admitting: HOSPITALIST
Payer: COMMERCIAL

## 2022-01-01 ENCOUNTER — APPOINTMENT (OUTPATIENT)
Dept: CT IMAGING | Facility: CLINIC | Age: 87
DRG: 963 | End: 2022-01-01
Attending: EMERGENCY MEDICINE
Payer: COMMERCIAL

## 2022-01-01 ENCOUNTER — APPOINTMENT (OUTPATIENT)
Dept: CT IMAGING | Facility: CLINIC | Age: 87
DRG: 963 | End: 2022-01-01
Attending: STUDENT IN AN ORGANIZED HEALTH CARE EDUCATION/TRAINING PROGRAM
Payer: COMMERCIAL

## 2022-01-01 ENCOUNTER — HOSPITAL ENCOUNTER (INPATIENT)
Facility: CLINIC | Age: 87
LOS: 2 days | Discharge: HOSPICE | DRG: 963 | End: 2022-12-31
Attending: EMERGENCY MEDICINE | Admitting: HOSPITALIST
Payer: COMMERCIAL

## 2022-01-01 VITALS
TEMPERATURE: 97.7 F | DIASTOLIC BLOOD PRESSURE: 57 MMHG | WEIGHT: 154.1 LBS | BODY MASS INDEX: 20.87 KG/M2 | OXYGEN SATURATION: 94 % | RESPIRATION RATE: 18 BRPM | HEART RATE: 80 BPM | SYSTOLIC BLOOD PRESSURE: 121 MMHG | HEIGHT: 72 IN

## 2022-01-01 DIAGNOSIS — N39.0 URINARY TRACT INFECTION WITH HEMATURIA, SITE UNSPECIFIED: ICD-10-CM
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
## 2022-01-01 DIAGNOSIS — S72.001A CLOSED FRACTURE OF RIGHT HIP, INITIAL ENCOUNTER (H): ICD-10-CM

## 2022-01-01 DIAGNOSIS — S06.33AA: ICD-10-CM

## 2022-01-01 DIAGNOSIS — R31.9 URINARY TRACT INFECTION WITH HEMATURIA, SITE UNSPECIFIED: ICD-10-CM

## 2022-01-01 LAB
ABO/RH(D): NORMAL
ALBUMIN UR-MCNC: 30 MG/DL
AMORPH CRY #/AREA URNS HPF: ABNORMAL /HPF
ANION GAP SERPL CALCULATED.3IONS-SCNC: 2 MMOL/L (ref 3–14)
ANTIBODY SCREEN: NEGATIVE
APPEARANCE UR: ABNORMAL
APTT PPP: 30 SECONDS (ref 22–38)
ATRIAL RATE - MUSE: NORMAL BPM
BASOPHILS # BLD AUTO: 0 10E3/UL (ref 0–0.2)
BASOPHILS NFR BLD AUTO: 0 %
BILIRUB UR QL STRIP: NEGATIVE
BUN SERPL-MCNC: 25 MG/DL (ref 7–30)
CALCIUM SERPL-MCNC: 8.5 MG/DL (ref 8.5–10.1)
CHLORIDE BLD-SCNC: 106 MMOL/L (ref 94–109)
CO2 SERPL-SCNC: 30 MMOL/L (ref 20–32)
COLOR UR AUTO: ABNORMAL
CREAT SERPL-MCNC: 0.97 MG/DL (ref 0.66–1.25)
DIASTOLIC BLOOD PRESSURE - MUSE: NORMAL MMHG
EOSINOPHIL # BLD AUTO: 0.2 10E3/UL (ref 0–0.7)
EOSINOPHIL NFR BLD AUTO: 2 %
ERYTHROCYTE [DISTWIDTH] IN BLOOD BY AUTOMATED COUNT: 11.9 % (ref 10–15)
FLUAV RNA SPEC QL NAA+PROBE: NEGATIVE
FLUBV RNA RESP QL NAA+PROBE: NEGATIVE
GFR SERPL CREATININE-BSD FRML MDRD: 75 ML/MIN/1.73M2
GLUCOSE BLD-MCNC: 95 MG/DL (ref 70–99)
GLUCOSE UR STRIP-MCNC: NEGATIVE MG/DL
HCT VFR BLD AUTO: 36.6 % (ref 40–53)
HGB BLD-MCNC: 12.6 G/DL (ref 13.3–17.7)
HGB UR QL STRIP: ABNORMAL
HOLD SPECIMEN: NORMAL
IMM GRANULOCYTES # BLD: 0.1 10E3/UL
IMM GRANULOCYTES NFR BLD: 1 %
INR PPP: 1.41 (ref 0.85–1.15)
INTERPRETATION ECG - MUSE: NORMAL
KETONES UR STRIP-MCNC: ABNORMAL MG/DL
LACTATE SERPL-SCNC: 1.8 MMOL/L (ref 0.7–2)
LACTATE SERPL-SCNC: 2 MMOL/L (ref 0.7–2)
LACTATE SERPL-SCNC: 2.8 MMOL/L (ref 0.7–2)
LEUKOCYTE ESTERASE UR QL STRIP: ABNORMAL
LYMPHOCYTES # BLD AUTO: 3 10E3/UL (ref 0.8–5.3)
LYMPHOCYTES NFR BLD AUTO: 29 %
MCH RBC QN AUTO: 34.4 PG (ref 26.5–33)
MCHC RBC AUTO-ENTMCNC: 34.4 G/DL (ref 31.5–36.5)
MCV RBC AUTO: 100 FL (ref 78–100)
MONOCYTES # BLD AUTO: 1.1 10E3/UL (ref 0–1.3)
MONOCYTES NFR BLD AUTO: 10 %
NEUTROPHILS # BLD AUTO: 5.9 10E3/UL (ref 1.6–8.3)
NEUTROPHILS NFR BLD AUTO: 58 %
NITRATE UR QL: NEGATIVE
NRBC # BLD AUTO: 0 10E3/UL
NRBC BLD AUTO-RTO: 0 /100
P AXIS - MUSE: NORMAL DEGREES
PH UR STRIP: 5.5 [PH] (ref 5–7)
PLATELET # BLD AUTO: 192 10E3/UL (ref 150–450)
POTASSIUM BLD-SCNC: 5.2 MMOL/L (ref 3.4–5.3)
PR INTERVAL - MUSE: NORMAL MS
QRS DURATION - MUSE: 72 MS
QT - MUSE: 402 MS
QTC - MUSE: 421 MS
R AXIS - MUSE: 43 DEGREES
RBC # BLD AUTO: 3.66 10E6/UL (ref 4.4–5.9)
RBC URINE: 71 /HPF
RSV RNA SPEC NAA+PROBE: NEGATIVE
SARS-COV-2 RNA RESP QL NAA+PROBE: NEGATIVE
SODIUM SERPL-SCNC: 138 MMOL/L (ref 133–144)
SP GR UR STRIP: 1.03 (ref 1–1.03)
SPECIMEN EXPIRATION DATE: NORMAL
SYSTOLIC BLOOD PRESSURE - MUSE: NORMAL MMHG
T AXIS - MUSE: 57 DEGREES
TROPONIN I SERPL HS-MCNC: 26 NG/L
UROBILINOGEN UR STRIP-MCNC: 2 MG/DL
VENTRICULAR RATE- MUSE: 66 BPM
WBC # BLD AUTO: 10.2 10E3/UL (ref 4–11)
WBC URINE: 140 /HPF

## 2022-01-01 PROCEDURE — 110N000005 HC R&B HOSPICE, ACCENT

## 2022-01-01 PROCEDURE — 81001 URINALYSIS AUTO W/SCOPE: CPT | Performed by: EMERGENCY MEDICINE

## 2022-01-01 PROCEDURE — 250N000011 HC RX IP 250 OP 636: Performed by: EMERGENCY MEDICINE

## 2022-01-01 PROCEDURE — 85025 COMPLETE CBC W/AUTO DIFF WBC: CPT | Performed by: EMERGENCY MEDICINE

## 2022-01-01 PROCEDURE — 70450 CT HEAD/BRAIN W/O DYE: CPT

## 2022-01-01 PROCEDURE — 99223 1ST HOSP IP/OBS HIGH 75: CPT | Performed by: PHYSICIAN ASSISTANT

## 2022-01-01 PROCEDURE — 84484 ASSAY OF TROPONIN QUANT: CPT | Performed by: EMERGENCY MEDICINE

## 2022-01-01 PROCEDURE — 86901 BLOOD TYPING SEROLOGIC RH(D): CPT | Performed by: EMERGENCY MEDICINE

## 2022-01-01 PROCEDURE — 250N000009 HC RX 250

## 2022-01-01 PROCEDURE — 250N000011 HC RX IP 250 OP 636: Performed by: HOSPITALIST

## 2022-01-01 PROCEDURE — 85730 THROMBOPLASTIN TIME PARTIAL: CPT | Performed by: EMERGENCY MEDICINE

## 2022-01-01 PROCEDURE — 73502 X-RAY EXAM HIP UNI 2-3 VIEWS: CPT

## 2022-01-01 PROCEDURE — 36415 COLL VENOUS BLD VENIPUNCTURE: CPT | Performed by: STUDENT IN AN ORGANIZED HEALTH CARE EDUCATION/TRAINING PROGRAM

## 2022-01-01 PROCEDURE — 99223 1ST HOSP IP/OBS HIGH 75: CPT | Mod: AI | Performed by: STUDENT IN AN ORGANIZED HEALTH CARE EDUCATION/TRAINING PROGRAM

## 2022-01-01 PROCEDURE — 258N000003 HC RX IP 258 OP 636: Performed by: STUDENT IN AN ORGANIZED HEALTH CARE EDUCATION/TRAINING PROGRAM

## 2022-01-01 PROCEDURE — 83605 ASSAY OF LACTIC ACID: CPT | Performed by: HOSPITALIST

## 2022-01-01 PROCEDURE — 82310 ASSAY OF CALCIUM: CPT | Performed by: EMERGENCY MEDICINE

## 2022-01-01 PROCEDURE — 93005 ELECTROCARDIOGRAM TRACING: CPT

## 2022-01-01 PROCEDURE — 87040 BLOOD CULTURE FOR BACTERIA: CPT | Performed by: EMERGENCY MEDICINE

## 2022-01-01 PROCEDURE — 87637 SARSCOV2&INF A&B&RSV AMP PRB: CPT | Performed by: PHYSICIAN ASSISTANT

## 2022-01-01 PROCEDURE — 250N000011 HC RX IP 250 OP 636

## 2022-01-01 PROCEDURE — 87086 URINE CULTURE/COLONY COUNT: CPT | Performed by: EMERGENCY MEDICINE

## 2022-01-01 PROCEDURE — 120N000001 HC R&B MED SURG/OB

## 2022-01-01 PROCEDURE — 36415 COLL VENOUS BLD VENIPUNCTURE: CPT | Performed by: EMERGENCY MEDICINE

## 2022-01-01 PROCEDURE — 999N000157 HC STATISTIC RCP TIME EA 10 MIN

## 2022-01-01 PROCEDURE — 83605 ASSAY OF LACTIC ACID: CPT | Performed by: STUDENT IN AN ORGANIZED HEALTH CARE EDUCATION/TRAINING PROGRAM

## 2022-01-01 PROCEDURE — 99207 PR NO BILLABLE SERVICE THIS VISIT: CPT | Mod: GV | Performed by: HOSPITALIST

## 2022-01-01 PROCEDURE — 258N000003 HC RX IP 258 OP 636

## 2022-01-01 PROCEDURE — 250N000011 HC RX IP 250 OP 636: Performed by: STUDENT IN AN ORGANIZED HEALTH CARE EDUCATION/TRAINING PROGRAM

## 2022-01-01 PROCEDURE — 85610 PROTHROMBIN TIME: CPT | Performed by: EMERGENCY MEDICINE

## 2022-01-01 PROCEDURE — 99231 SBSQ HOSP IP/OBS SF/LOW 25: CPT | Performed by: PHYSICIAN ASSISTANT

## 2022-01-01 PROCEDURE — 99285 EMERGENCY DEPT VISIT HI MDM: CPT | Mod: 25

## 2022-01-01 PROCEDURE — 71045 X-RAY EXAM CHEST 1 VIEW: CPT

## 2022-01-01 PROCEDURE — 250N000013 HC RX MED GY IP 250 OP 250 PS 637: Performed by: STUDENT IN AN ORGANIZED HEALTH CARE EDUCATION/TRAINING PROGRAM

## 2022-01-01 PROCEDURE — 36415 COLL VENOUS BLD VENIPUNCTURE: CPT | Performed by: HOSPITALIST

## 2022-01-01 PROCEDURE — 72125 CT NECK SPINE W/O DYE: CPT

## 2022-01-01 PROCEDURE — 250N000011 HC RX IP 250 OP 636: Performed by: PHYSICIAN ASSISTANT

## 2022-01-01 RX ORDER — CARBOXYMETHYLCELLULOSE SODIUM 5 MG/ML
1-2 SOLUTION/ DROPS OPHTHALMIC
Status: CANCELLED | OUTPATIENT
Start: 2022-01-01

## 2022-01-01 RX ORDER — PROCHLORPERAZINE 25 MG
12.5 SUPPOSITORY, RECTAL RECTAL EVERY 12 HOURS PRN
Status: DISCONTINUED | OUTPATIENT
Start: 2022-01-01 | End: 2023-01-01 | Stop reason: HOSPADM

## 2022-01-01 RX ORDER — CEFTRIAXONE 1 G/1
1 INJECTION, POWDER, FOR SOLUTION INTRAMUSCULAR; INTRAVENOUS EVERY 24 HOURS
Status: DISCONTINUED | OUTPATIENT
Start: 2022-01-01 | End: 2022-01-01

## 2022-01-01 RX ORDER — CARBIDOPA AND LEVODOPA 25; 100 MG/1; MG/1
1 TABLET ORAL 3 TIMES DAILY
Status: DISCONTINUED | OUTPATIENT
Start: 2022-01-01 | End: 2022-01-01

## 2022-01-01 RX ORDER — OLANZAPINE 10 MG/2ML
5 INJECTION, POWDER, FOR SOLUTION INTRAMUSCULAR
Status: COMPLETED | OUTPATIENT
Start: 2022-01-01 | End: 2022-01-01

## 2022-01-01 RX ORDER — AMOXICILLIN 250 MG
1 CAPSULE ORAL 2 TIMES DAILY
Status: DISCONTINUED | OUTPATIENT
Start: 2022-01-01 | End: 2022-01-01

## 2022-01-01 RX ORDER — SENNOSIDES 8.6 MG
1 TABLET ORAL EVERY 8 HOURS PRN
COMMUNITY

## 2022-01-01 RX ORDER — ACETAMINOPHEN 650 MG/1
650 SUPPOSITORY RECTAL EVERY 4 HOURS PRN
Status: DISCONTINUED | OUTPATIENT
Start: 2022-01-01 | End: 2022-01-01 | Stop reason: HOSPADM

## 2022-01-01 RX ORDER — HYDROMORPHONE HYDROCHLORIDE 2 MG/1
2 TABLET ORAL
Status: DISCONTINUED | OUTPATIENT
Start: 2022-01-01 | End: 2022-01-01 | Stop reason: HOSPADM

## 2022-01-01 RX ORDER — LORAZEPAM 2 MG/ML
1 INJECTION INTRAMUSCULAR
Status: DISCONTINUED | OUTPATIENT
Start: 2022-01-01 | End: 2023-01-01 | Stop reason: HOSPADM

## 2022-01-01 RX ORDER — NALOXONE HYDROCHLORIDE 0.4 MG/ML
0.1 INJECTION, SOLUTION INTRAMUSCULAR; INTRAVENOUS; SUBCUTANEOUS
Status: DISCONTINUED | OUTPATIENT
Start: 2022-01-01 | End: 2023-01-01 | Stop reason: HOSPADM

## 2022-01-01 RX ORDER — LOPERAMIDE HCL 2 MG
2 CAPSULE ORAL EVERY 4 HOURS PRN
COMMUNITY

## 2022-01-01 RX ORDER — PROCHLORPERAZINE 25 MG
12.5 SUPPOSITORY, RECTAL RECTAL EVERY 12 HOURS PRN
Status: CANCELLED | OUTPATIENT
Start: 2022-01-01

## 2022-01-01 RX ORDER — AMPICILLIN AND SULBACTAM 2; 1 G/1; G/1
3 INJECTION, POWDER, FOR SOLUTION INTRAMUSCULAR; INTRAVENOUS EVERY 6 HOURS
Status: DISCONTINUED | OUTPATIENT
Start: 2022-01-01 | End: 2022-01-01

## 2022-01-01 RX ORDER — HYDROMORPHONE HYDROCHLORIDE 1 MG/ML
1 SOLUTION ORAL
Status: DISCONTINUED | OUTPATIENT
Start: 2022-01-01 | End: 2023-01-01

## 2022-01-01 RX ORDER — HYDROMORPHONE HCL IN WATER/PF 6 MG/30 ML
0.2 PATIENT CONTROLLED ANALGESIA SYRINGE INTRAVENOUS
Status: DISCONTINUED | OUTPATIENT
Start: 2022-01-01 | End: 2022-01-01

## 2022-01-01 RX ORDER — CARBOXYMETHYLCELLULOSE SODIUM 5 MG/ML
1-2 SOLUTION/ DROPS OPHTHALMIC
Status: DISCONTINUED | OUTPATIENT
Start: 2022-01-01 | End: 2023-01-01 | Stop reason: HOSPADM

## 2022-01-01 RX ORDER — NALOXONE HYDROCHLORIDE 0.4 MG/ML
0.2 INJECTION, SOLUTION INTRAMUSCULAR; INTRAVENOUS; SUBCUTANEOUS
Status: CANCELLED | OUTPATIENT
Start: 2022-01-01

## 2022-01-01 RX ORDER — PROCHLORPERAZINE 25 MG
12.5 SUPPOSITORY, RECTAL RECTAL EVERY 12 HOURS PRN
Status: DISCONTINUED | OUTPATIENT
Start: 2022-01-01 | End: 2022-01-01 | Stop reason: HOSPADM

## 2022-01-01 RX ORDER — HYDROMORPHONE HYDROCHLORIDE 1 MG/ML
1 SOLUTION ORAL
Status: DISCONTINUED | OUTPATIENT
Start: 2022-01-01 | End: 2022-01-01 | Stop reason: HOSPADM

## 2022-01-01 RX ORDER — CEFAZOLIN SODIUM 2 G/100ML
2 INJECTION, SOLUTION INTRAVENOUS SEE ADMIN INSTRUCTIONS
Status: CANCELLED | OUTPATIENT
Start: 2022-01-01

## 2022-01-01 RX ORDER — CEFAZOLIN SODIUM 2 G/100ML
2 INJECTION, SOLUTION INTRAVENOUS
Status: CANCELLED | OUTPATIENT
Start: 2022-01-01

## 2022-01-01 RX ORDER — LIDOCAINE 40 MG/G
CREAM TOPICAL
Status: DISCONTINUED | OUTPATIENT
Start: 2022-01-01 | End: 2022-01-01 | Stop reason: HOSPADM

## 2022-01-01 RX ORDER — NALOXONE HYDROCHLORIDE 0.4 MG/ML
0.2 INJECTION, SOLUTION INTRAMUSCULAR; INTRAVENOUS; SUBCUTANEOUS
Status: DISCONTINUED | OUTPATIENT
Start: 2022-01-01 | End: 2022-01-01 | Stop reason: HOSPADM

## 2022-01-01 RX ORDER — PROCHLORPERAZINE MALEATE 5 MG
5 TABLET ORAL EVERY 6 HOURS PRN
Status: DISCONTINUED | OUTPATIENT
Start: 2022-01-01 | End: 2023-01-01 | Stop reason: HOSPADM

## 2022-01-01 RX ORDER — HYDROMORPHONE HYDROCHLORIDE 5 MG/5ML
2 SOLUTION ORAL
Status: CANCELLED | OUTPATIENT
Start: 2022-01-01

## 2022-01-01 RX ORDER — NALOXONE HYDROCHLORIDE 0.4 MG/ML
0.2 INJECTION, SOLUTION INTRAMUSCULAR; INTRAVENOUS; SUBCUTANEOUS
Status: DISCONTINUED | OUTPATIENT
Start: 2022-01-01 | End: 2023-01-01 | Stop reason: HOSPADM

## 2022-01-01 RX ORDER — LIDOCAINE 40 MG/G
CREAM TOPICAL
Status: DISCONTINUED | OUTPATIENT
Start: 2022-01-01 | End: 2022-01-01

## 2022-01-01 RX ORDER — ONDANSETRON 4 MG/1
4 TABLET, ORALLY DISINTEGRATING ORAL EVERY 6 HOURS PRN
Status: DISCONTINUED | OUTPATIENT
Start: 2022-01-01 | End: 2022-01-01

## 2022-01-01 RX ORDER — CARBOXYMETHYLCELLULOSE SODIUM 10 MG/ML
1 GEL OPHTHALMIC 4 TIMES DAILY
COMMUNITY

## 2022-01-01 RX ORDER — CEFTRIAXONE 2 G/1
2 INJECTION, POWDER, FOR SOLUTION INTRAMUSCULAR; INTRAVENOUS ONCE
Status: COMPLETED | OUTPATIENT
Start: 2022-01-01 | End: 2022-01-01

## 2022-01-01 RX ORDER — CARBIDOPA AND LEVODOPA 25; 100 MG/1; MG/1
1 TABLET ORAL 3 TIMES DAILY
COMMUNITY

## 2022-01-01 RX ORDER — ACETAMINOPHEN 325 MG/1
650 TABLET ORAL EVERY 4 HOURS PRN
Status: DISCONTINUED | OUTPATIENT
Start: 2022-01-01 | End: 2022-01-01 | Stop reason: HOSPADM

## 2022-01-01 RX ORDER — AMOXICILLIN 250 MG
2 CAPSULE ORAL 2 TIMES DAILY
Status: DISCONTINUED | OUTPATIENT
Start: 2022-01-01 | End: 2022-01-01

## 2022-01-01 RX ORDER — LIDOCAINE 40 MG/G
CREAM TOPICAL
Status: CANCELLED | OUTPATIENT
Start: 2022-01-01

## 2022-01-01 RX ORDER — ACETAMINOPHEN 650 MG/1
650 SUPPOSITORY RECTAL EVERY 4 HOURS PRN
Status: CANCELLED | OUTPATIENT
Start: 2022-01-01

## 2022-01-01 RX ORDER — NITROGLYCERIN 0.4 MG/1
0.4 TABLET SUBLINGUAL EVERY 5 MIN PRN
Status: DISCONTINUED | OUTPATIENT
Start: 2022-01-01 | End: 2022-01-01 | Stop reason: HOSPADM

## 2022-01-01 RX ORDER — ACETAMINOPHEN 325 MG/10.15ML
650 LIQUID ORAL EVERY 4 HOURS PRN
Status: DISCONTINUED | OUTPATIENT
Start: 2022-01-01 | End: 2022-01-01 | Stop reason: HOSPADM

## 2022-01-01 RX ORDER — VENLAFAXINE 75 MG/1
75 TABLET ORAL 3 TIMES DAILY
COMMUNITY

## 2022-01-01 RX ORDER — ATROPINE SULFATE 10 MG/ML
2 SOLUTION/ DROPS OPHTHALMIC EVERY 4 HOURS PRN
Status: DISCONTINUED | OUTPATIENT
Start: 2022-01-01 | End: 2022-01-01 | Stop reason: HOSPADM

## 2022-01-01 RX ORDER — HYDROMORPHONE HYDROCHLORIDE 1 MG/ML
0.5 INJECTION, SOLUTION INTRAMUSCULAR; INTRAVENOUS; SUBCUTANEOUS
Status: CANCELLED | OUTPATIENT
Start: 2022-01-01

## 2022-01-01 RX ORDER — ONDANSETRON 2 MG/ML
4 INJECTION INTRAMUSCULAR; INTRAVENOUS EVERY 6 HOURS PRN
Status: DISCONTINUED | OUTPATIENT
Start: 2022-01-01 | End: 2022-01-01

## 2022-01-01 RX ORDER — ONDANSETRON 4 MG/1
4 TABLET, ORALLY DISINTEGRATING ORAL EVERY 6 HOURS PRN
Status: DISCONTINUED | OUTPATIENT
Start: 2022-01-01 | End: 2022-01-01 | Stop reason: HOSPADM

## 2022-01-01 RX ORDER — HYDROMORPHONE HYDROCHLORIDE 5 MG/5ML
2 SOLUTION ORAL
Status: DISCONTINUED | OUTPATIENT
Start: 2022-01-01 | End: 2022-01-01 | Stop reason: HOSPADM

## 2022-01-01 RX ORDER — HYDROMORPHONE HYDROCHLORIDE 2 MG/1
2 TABLET ORAL
Status: DISCONTINUED | OUTPATIENT
Start: 2022-01-01 | End: 2023-01-01

## 2022-01-01 RX ORDER — NALOXONE HYDROCHLORIDE 0.4 MG/ML
0.1 INJECTION, SOLUTION INTRAMUSCULAR; INTRAVENOUS; SUBCUTANEOUS
Status: CANCELLED | OUTPATIENT
Start: 2022-01-01

## 2022-01-01 RX ORDER — LORAZEPAM 2 MG/ML
1 INJECTION INTRAMUSCULAR
Status: CANCELLED | OUTPATIENT
Start: 2022-01-01

## 2022-01-01 RX ORDER — HYDROMORPHONE HYDROCHLORIDE 1 MG/ML
1 SOLUTION ORAL
Status: CANCELLED | OUTPATIENT
Start: 2022-01-01

## 2022-01-01 RX ORDER — ATROPINE SULFATE 10 MG/ML
2 SOLUTION/ DROPS OPHTHALMIC EVERY 4 HOURS PRN
Status: CANCELLED | OUTPATIENT
Start: 2022-01-01

## 2022-01-01 RX ORDER — SODIUM CHLORIDE 9 MG/ML
INJECTION, SOLUTION INTRAVENOUS CONTINUOUS
Status: DISCONTINUED | OUTPATIENT
Start: 2022-01-01 | End: 2022-01-01

## 2022-01-01 RX ORDER — LEVETIRACETAM 5 MG/ML
500 INJECTION INTRAVASCULAR EVERY 12 HOURS
Status: DISCONTINUED | OUTPATIENT
Start: 2022-01-01 | End: 2022-01-01

## 2022-01-01 RX ORDER — HYDROMORPHONE HYDROCHLORIDE 1 MG/ML
0.3 INJECTION, SOLUTION INTRAMUSCULAR; INTRAVENOUS; SUBCUTANEOUS
Status: DISCONTINUED | OUTPATIENT
Start: 2022-01-01 | End: 2023-01-01 | Stop reason: HOSPADM

## 2022-01-01 RX ORDER — ONDANSETRON 2 MG/ML
4 INJECTION INTRAMUSCULAR; INTRAVENOUS EVERY 6 HOURS PRN
Status: CANCELLED | OUTPATIENT
Start: 2022-01-01

## 2022-01-01 RX ORDER — LORAZEPAM 1 MG/1
1 TABLET ORAL
Status: DISCONTINUED | OUTPATIENT
Start: 2022-01-01 | End: 2023-01-01 | Stop reason: HOSPADM

## 2022-01-01 RX ORDER — WATER 10 ML/10ML
INJECTION INTRAMUSCULAR; INTRAVENOUS; SUBCUTANEOUS
Status: COMPLETED
Start: 2022-01-01 | End: 2022-01-01

## 2022-01-01 RX ORDER — NALOXONE HYDROCHLORIDE 0.4 MG/ML
0.1 INJECTION, SOLUTION INTRAMUSCULAR; INTRAVENOUS; SUBCUTANEOUS
Status: DISCONTINUED | OUTPATIENT
Start: 2022-01-01 | End: 2023-01-01

## 2022-01-01 RX ORDER — NALOXONE HYDROCHLORIDE 0.4 MG/ML
0.2 INJECTION, SOLUTION INTRAMUSCULAR; INTRAVENOUS; SUBCUTANEOUS
Status: DISCONTINUED | OUTPATIENT
Start: 2022-01-01 | End: 2023-01-01

## 2022-01-01 RX ORDER — MINERAL OIL, PETROLATUM 425; 573 MG/G; MG/G
OINTMENT OPHTHALMIC AT BEDTIME
COMMUNITY

## 2022-01-01 RX ORDER — HYDROMORPHONE HYDROCHLORIDE 2 MG/1
2 TABLET ORAL
Status: CANCELLED | OUTPATIENT
Start: 2022-01-01

## 2022-01-01 RX ORDER — ACETAMINOPHEN 325 MG/10.15ML
650 LIQUID ORAL EVERY 4 HOURS PRN
Status: CANCELLED | OUTPATIENT
Start: 2022-01-01

## 2022-01-01 RX ORDER — CARBOXYMETHYLCELLULOSE SODIUM 5 MG/ML
1-2 SOLUTION/ DROPS OPHTHALMIC
Status: DISCONTINUED | OUTPATIENT
Start: 2022-01-01 | End: 2022-01-01 | Stop reason: HOSPADM

## 2022-01-01 RX ORDER — ONDANSETRON 4 MG/1
4 TABLET, ORALLY DISINTEGRATING ORAL EVERY 6 HOURS PRN
Status: DISCONTINUED | OUTPATIENT
Start: 2022-01-01 | End: 2023-01-01 | Stop reason: HOSPADM

## 2022-01-01 RX ORDER — LIDOCAINE 40 MG/G
CREAM TOPICAL
Status: DISCONTINUED | OUTPATIENT
Start: 2022-01-01 | End: 2023-01-01 | Stop reason: HOSPADM

## 2022-01-01 RX ORDER — LORAZEPAM 1 MG/1
1 TABLET ORAL
Status: DISCONTINUED | OUTPATIENT
Start: 2022-01-01 | End: 2022-01-01 | Stop reason: HOSPADM

## 2022-01-01 RX ORDER — ACETAMINOPHEN 325 MG/10.15ML
650 LIQUID ORAL EVERY 4 HOURS PRN
Status: DISCONTINUED | OUTPATIENT
Start: 2022-01-01 | End: 2023-01-01 | Stop reason: HOSPADM

## 2022-01-01 RX ORDER — ONDANSETRON 4 MG/1
4 TABLET, ORALLY DISINTEGRATING ORAL EVERY 6 HOURS PRN
Status: CANCELLED | OUTPATIENT
Start: 2022-01-01

## 2022-01-01 RX ORDER — ONDANSETRON 2 MG/ML
4 INJECTION INTRAMUSCULAR; INTRAVENOUS EVERY 6 HOURS PRN
Status: DISCONTINUED | OUTPATIENT
Start: 2022-01-01 | End: 2023-01-01 | Stop reason: HOSPADM

## 2022-01-01 RX ORDER — LEVETIRACETAM 10 MG/ML
1000 INJECTION INTRAVASCULAR ONCE
Status: COMPLETED | OUTPATIENT
Start: 2022-01-01 | End: 2022-01-01

## 2022-01-01 RX ORDER — ONDANSETRON 2 MG/ML
4 INJECTION INTRAMUSCULAR; INTRAVENOUS EVERY 6 HOURS PRN
Status: DISCONTINUED | OUTPATIENT
Start: 2022-01-01 | End: 2022-01-01 | Stop reason: HOSPADM

## 2022-01-01 RX ORDER — NALOXONE HYDROCHLORIDE 0.4 MG/ML
0.1 INJECTION, SOLUTION INTRAMUSCULAR; INTRAVENOUS; SUBCUTANEOUS
Status: DISCONTINUED | OUTPATIENT
Start: 2022-01-01 | End: 2022-01-01 | Stop reason: HOSPADM

## 2022-01-01 RX ORDER — HYDROMORPHONE HYDROCHLORIDE 1 MG/ML
0.5 INJECTION, SOLUTION INTRAMUSCULAR; INTRAVENOUS; SUBCUTANEOUS
Status: DISCONTINUED | OUTPATIENT
Start: 2022-01-01 | End: 2022-01-01 | Stop reason: HOSPADM

## 2022-01-01 RX ORDER — NITROGLYCERIN 0.4 MG/1
0.4 TABLET SUBLINGUAL EVERY 5 MIN PRN
Status: CANCELLED | OUTPATIENT
Start: 2022-01-01

## 2022-01-01 RX ORDER — LORAZEPAM 1 MG/1
1 TABLET ORAL
Status: CANCELLED | OUTPATIENT
Start: 2022-01-01

## 2022-01-01 RX ORDER — VENLAFAXINE 75 MG/1
75 TABLET ORAL 3 TIMES DAILY
Status: DISCONTINUED | OUTPATIENT
Start: 2022-01-01 | End: 2022-01-01

## 2022-01-01 RX ORDER — LORAZEPAM 2 MG/ML
1 INJECTION INTRAMUSCULAR
Status: DISCONTINUED | OUTPATIENT
Start: 2022-01-01 | End: 2022-01-01 | Stop reason: HOSPADM

## 2022-01-01 RX ORDER — NITROGLYCERIN 0.4 MG/1
0.4 TABLET SUBLINGUAL EVERY 5 MIN PRN
Status: DISCONTINUED | OUTPATIENT
Start: 2022-01-01 | End: 2023-01-01 | Stop reason: HOSPADM

## 2022-01-01 RX ORDER — ACETAMINOPHEN 325 MG/1
650 TABLET ORAL EVERY 4 HOURS PRN
Status: DISCONTINUED | OUTPATIENT
Start: 2022-01-01 | End: 2023-01-01 | Stop reason: HOSPADM

## 2022-01-01 RX ORDER — CYCLOSPORINE 0.5 MG/ML
1 EMULSION OPHTHALMIC 2 TIMES DAILY
COMMUNITY

## 2022-01-01 RX ORDER — HYDROMORPHONE HYDROCHLORIDE 1 MG/ML
0.5 INJECTION, SOLUTION INTRAMUSCULAR; INTRAVENOUS; SUBCUTANEOUS
Status: DISCONTINUED | OUTPATIENT
Start: 2022-01-01 | End: 2023-01-01 | Stop reason: HOSPADM

## 2022-01-01 RX ORDER — PROCHLORPERAZINE MALEATE 5 MG
5 TABLET ORAL EVERY 6 HOURS PRN
Status: CANCELLED | OUTPATIENT
Start: 2022-01-01

## 2022-01-01 RX ORDER — HYDROMORPHONE HYDROCHLORIDE 1 MG/ML
0.3 INJECTION, SOLUTION INTRAMUSCULAR; INTRAVENOUS; SUBCUTANEOUS
Status: CANCELLED | OUTPATIENT
Start: 2022-01-01

## 2022-01-01 RX ORDER — PROCHLORPERAZINE MALEATE 5 MG
5 TABLET ORAL EVERY 6 HOURS PRN
Status: DISCONTINUED | OUTPATIENT
Start: 2022-01-01 | End: 2022-01-01 | Stop reason: HOSPADM

## 2022-01-01 RX ORDER — ATROPINE SULFATE 10 MG/ML
2 SOLUTION/ DROPS OPHTHALMIC EVERY 4 HOURS PRN
Status: DISCONTINUED | OUTPATIENT
Start: 2022-01-01 | End: 2023-01-01 | Stop reason: HOSPADM

## 2022-01-01 RX ORDER — ACETAMINOPHEN 650 MG/1
650 SUPPOSITORY RECTAL EVERY 4 HOURS PRN
Status: DISCONTINUED | OUTPATIENT
Start: 2022-01-01 | End: 2023-01-01 | Stop reason: HOSPADM

## 2022-01-01 RX ORDER — HYDROMORPHONE HYDROCHLORIDE 1 MG/ML
0.3 INJECTION, SOLUTION INTRAMUSCULAR; INTRAVENOUS; SUBCUTANEOUS
Status: DISCONTINUED | OUTPATIENT
Start: 2022-01-01 | End: 2022-01-01 | Stop reason: HOSPADM

## 2022-01-01 RX ORDER — HYDROMORPHONE HYDROCHLORIDE 5 MG/5ML
2 SOLUTION ORAL
Status: DISCONTINUED | OUTPATIENT
Start: 2022-01-01 | End: 2023-01-01

## 2022-01-01 RX ORDER — ACETAMINOPHEN 325 MG/1
650 TABLET ORAL EVERY 4 HOURS PRN
Status: CANCELLED | OUTPATIENT
Start: 2022-01-01

## 2022-01-01 RX ORDER — PIPERACILLIN SODIUM, TAZOBACTAM SODIUM 3; .375 G/15ML; G/15ML
3.38 INJECTION, POWDER, LYOPHILIZED, FOR SOLUTION INTRAVENOUS EVERY 6 HOURS
Status: DISCONTINUED | OUTPATIENT
Start: 2022-01-01 | End: 2022-01-01

## 2022-01-01 RX ADMIN — PIPERACILLIN AND TAZOBACTAM 3.38 G: 3; .375 INJECTION, POWDER, FOR SOLUTION INTRAVENOUS at 11:15

## 2022-01-01 RX ADMIN — HYDROMORPHONE HYDROCHLORIDE 0.3 MG: 1 INJECTION, SOLUTION INTRAMUSCULAR; INTRAVENOUS; SUBCUTANEOUS at 20:48

## 2022-01-01 RX ADMIN — HYDROMORPHONE HYDROCHLORIDE 0.5 MG: 1 INJECTION, SOLUTION INTRAMUSCULAR; INTRAVENOUS; SUBCUTANEOUS at 05:54

## 2022-01-01 RX ADMIN — ACETAMINOPHEN 650 MG: 650 SUPPOSITORY RECTAL at 06:03

## 2022-01-01 RX ADMIN — HYDROMORPHONE HYDROCHLORIDE 0.5 MG: 1 INJECTION, SOLUTION INTRAMUSCULAR; INTRAVENOUS; SUBCUTANEOUS at 11:14

## 2022-01-01 RX ADMIN — HYDROMORPHONE HYDROCHLORIDE 0.2 MG: 0.2 INJECTION, SOLUTION INTRAMUSCULAR; INTRAVENOUS; SUBCUTANEOUS at 23:58

## 2022-01-01 RX ADMIN — PIPERACILLIN AND TAZOBACTAM 3.38 G: 3; .375 INJECTION, POWDER, FOR SOLUTION INTRAVENOUS at 05:09

## 2022-01-01 RX ADMIN — SODIUM CHLORIDE 1000 ML: 9 INJECTION, SOLUTION INTRAVENOUS at 04:40

## 2022-01-01 RX ADMIN — ONDANSETRON 4 MG: 2 INJECTION INTRAMUSCULAR; INTRAVENOUS at 03:18

## 2022-01-01 RX ADMIN — SODIUM CHLORIDE: 9 INJECTION, SOLUTION INTRAVENOUS at 00:25

## 2022-01-01 RX ADMIN — ACETAMINOPHEN 650 MG: 650 SUPPOSITORY RECTAL at 02:40

## 2022-01-01 RX ADMIN — VANCOMYCIN HYDROCHLORIDE 1750 MG: 10 INJECTION, POWDER, LYOPHILIZED, FOR SOLUTION INTRAVENOUS at 07:22

## 2022-01-01 RX ADMIN — OLANZAPINE 5 MG: 10 INJECTION, POWDER, FOR SOLUTION INTRAMUSCULAR at 03:34

## 2022-01-01 RX ADMIN — HYDROMORPHONE HYDROCHLORIDE 0.2 MG: 0.2 INJECTION, SOLUTION INTRAMUSCULAR; INTRAVENOUS; SUBCUTANEOUS at 06:06

## 2022-01-01 RX ADMIN — HYDROMORPHONE HYDROCHLORIDE 0.3 MG: 1 INJECTION, SOLUTION INTRAMUSCULAR; INTRAVENOUS; SUBCUTANEOUS at 17:06

## 2022-01-01 RX ADMIN — HYDROMORPHONE HYDROCHLORIDE 0.2 MG: 0.2 INJECTION, SOLUTION INTRAMUSCULAR; INTRAVENOUS; SUBCUTANEOUS at 23:57

## 2022-01-01 RX ADMIN — HYDROMORPHONE HYDROCHLORIDE 0.3 MG: 1 INJECTION, SOLUTION INTRAMUSCULAR; INTRAVENOUS; SUBCUTANEOUS at 01:02

## 2022-01-01 RX ADMIN — CARBIDOPA AND LEVODOPA 1 TABLET: 25; 100 TABLET ORAL at 00:24

## 2022-01-01 RX ADMIN — HYDROMORPHONE HYDROCHLORIDE 0.2 MG: 0.2 INJECTION, SOLUTION INTRAMUSCULAR; INTRAVENOUS; SUBCUTANEOUS at 02:32

## 2022-01-01 RX ADMIN — SODIUM CHLORIDE 1000 ML: 9 INJECTION, SOLUTION INTRAVENOUS at 04:00

## 2022-01-01 RX ADMIN — LEVETIRACETAM 500 MG: 5 INJECTION INTRAVENOUS at 10:53

## 2022-01-01 RX ADMIN — LEVETIRACETAM 1000 MG: 10 INJECTION INTRAVENOUS at 10:23

## 2022-01-01 RX ADMIN — WATER 2.1 ML: 1 INJECTION INTRAMUSCULAR; INTRAVENOUS; SUBCUTANEOUS at 03:34

## 2022-01-01 RX ADMIN — CEFTRIAXONE SODIUM 2 G: 2 INJECTION, POWDER, FOR SOLUTION INTRAMUSCULAR; INTRAVENOUS at 22:33

## 2022-01-01 ASSESSMENT — ACTIVITIES OF DAILY LIVING (ADL)
ADLS_ACUITY_SCORE: 43
ADLS_ACUITY_SCORE: 35
ADLS_ACUITY_SCORE: 43
ADLS_ACUITY_SCORE: 35
ADLS_ACUITY_SCORE: 43
ADLS_ACUITY_SCORE: 35
ADLS_ACUITY_SCORE: 43
ADLS_ACUITY_SCORE: 43
ADLS_ACUITY_SCORE: 35
ADLS_ACUITY_SCORE: 35

## 2022-01-01 ASSESSMENT — ENCOUNTER SYMPTOMS: ARTHRALGIAS: 1

## 2022-12-29 PROBLEM — R31.9 URINARY TRACT INFECTION WITH HEMATURIA, SITE UNSPECIFIED: Status: ACTIVE | Noted: 2022-01-01

## 2022-12-29 PROBLEM — N39.0 URINARY TRACT INFECTION WITH HEMATURIA, SITE UNSPECIFIED: Status: ACTIVE | Noted: 2022-01-01
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
## 2022-12-29 PROBLEM — S72.001A CLOSED FRACTURE OF RIGHT HIP, INITIAL ENCOUNTER (H): Status: ACTIVE | Noted: 2022-01-01

## 2022-12-29 PROBLEM — S06.33AA: Status: ACTIVE | Noted: 2022-01-01

## 2022-12-30 NOTE — CONSULTS
[facility]    Orthopedic Consultation    Maricarmen Lovell MRN# [id number]   Age: 88 year old YOB: 1934     Date of Admission: 12/29/2022    Reason for consult: Right intertrochanteric femur fracture        Requesting physician: Clau Richard MD       Level of consult: Consult, follow and place orders           Assessment and Plan:   Assessment:   Right intertrochanteric femur fracture      Plan:   The patient's history and clinical/diagnostic findings were reviewed with the on-call orthopedic trauma surgeon, Dr. Bernardino Kumari. The patient is a candidate for a right intertrochanteric femur fracture percutaneous pinning versus intramedullary nailing. Unfortunately, the patient has required respiratory support due to acute hypoxic respiratory failure following a suspected aspiration event. He also meets criteria for severe sepsis with infection of unclear origin at this time (likely UTI and/or aspiration pneumonia). The patient was also found to have a subacute intraparenchymal hemorrhage that neurosurgery is following. Because of this, he is not currently optimized for surgery, which would be palliative and also facilitate better return to function. Dr. Bernardino Kumari will discuss the risks, benefits, and outcomes of surgery while obtaining consent from the patient's family once the patient is medically cleared.    Surgeon: Dr. Bernardino Kumari  Okay for PO from orthopedic standpoint pending medical clearance for surgery, but patient likely to remain NPO for the time being due to aspiration risk.  NWB/bedrest until postop.  Continue pain regimen.  Continue IV vancomycin and Zosyn per hospitalist for severe sepsis.  Anticoagulation: Continue PTA Xarelto at this time per hospitalist's discretion, but would recommend holding for 24 hours prior to surgery once a date/time is determined.  Type and screen ordered.  COVID test pending.    Please contact orthopedic trauma team if  any questions or concerns arise.    ADDENDUM:  The patient will be boarded for a right intertrochanteric femur fracture intramedullary nailing tomorrow (12/31/22) pending if he is medically cleared at that time. NPO at midnight. Hold Xarelto (not currently listed in active meds).    SECOND ADDENDUM:  Reached out to hospitalist, Dr. Bigg Bentley, regarding medical optimization for surgery. He informed me that he spoke with the patient's sisters and they have decided on comfort cares with hospice/palliative consults in place. I discussed this with Dr. Kumari and the patient's surgery was cancelled. He will intend on contacting the patient's family later today. Otherwise, orthopedics will sign off at this point.           Chief Complaint:   Right hip fracture         History of Present Illness:   Medical history obtained through chart review as patient is unable to participate due to obtundation. Maricarmen Lovell is an 88 year old male with past medical history significant for Alzheimer's Disease, chronic atrial fibrillation on Xarelto, CKD stage III, HTN, COPD, depression, and frequent falls admitted on 12/29/2022 after a fall. He is found to have a new intertrochanteric fracture of the right hip. A probable subacute parenchymal hematoma in the right occipitoparietal region was also diagnosed. Overnight, the patient had a fever of 101 F and increased agitation. He subsequently was found to have desats to the low 80s and hypotension. CXR negative for infiltrates, but aspiration is suspected. He was placed on HFNC. Currently, infectious work-up is in process with hospitalist. Unable to assess pain control regarding right hip fracture due to decreased mental status. No prior surgeries charted from the right hip, but the patient is s/p left STEPHANIE. He lives in a memory care facility.          Past Medical History:     Past Medical History:   Diagnosis Date     Alzheimer disease (H)      Chronic a-fib (H)      CKD (chronic  kidney disease) stage 3, GFR 30-59 ml/min (H)      COPD (chronic obstructive pulmonary disease) (H)      Depression      Frequent falls      HTN (hypertension)              Past Surgical History:     Past Surgical History:   Procedure Laterality Date     OPEN REDUCTION INTERNAL FIXATION HIP BIPOLAR Left 1/12/2021    Procedure: LEFT HIP CEMENTED HEMIARTHROPLASTY;  Surgeon: Moody Gaines MD;  Location:  OR             Social History:     Social History     Tobacco Use     Smoking status: Former     Smokeless tobacco: Never   Substance Use Topics     Alcohol use: Never             Family History:   History reviewed. No pertinent family history.          Immunizations:     VACCINE/DOSE   Diptheria   DPT   DTAP   HBIG   Hepatitis A   Hepatitis B   HIB   Influenza   Measles   Meningococcal   MMR   Mumps   Pneumococcal   Polio   Rubella   Small Pox   TDAP   Varicella   Zoster             Allergies:     Allergies   Allergen Reactions     Pecan [Nuts] Anaphylaxis     Fluoxetine Nausea     Juglans              Medications:     Current Facility-Administered Medications   Medication     acetaminophen (TYLENOL) tablet 650 mg    Or     acetaminophen (TYLENOL) solution 650 mg    Or     acetaminophen (TYLENOL) Suppository 650 mg     carbidopa-levodopa (SINEMET)  MG per tablet 1 tablet     HYDROmorphone (DILAUDID) injection 0.2 mg     levETIRAcetam (KEPPRA) intermittent infusion 1,000 mg     levETIRAcetam (KEPPRA) intermittent infusion 500 mg     lidocaine (LMX4) cream     lidocaine 1 % 0.1-1 mL     Medication Instruction     nitroGLYcerin (NITROSTAT) sublingual tablet 0.4 mg     ondansetron (ZOFRAN ODT) ODT tab 4 mg    Or     ondansetron (ZOFRAN) injection 4 mg     oxyCODONE IR (ROXICODONE) half-tab 2.5 mg     piperacillin-tazobactam (ZOSYN) 3.375 g vial to attach to  mL bag     prochlorperazine (COMPAZINE) injection 5 mg    Or     prochlorperazine (COMPAZINE) tablet 5 mg    Or     prochlorperazine (COMPAZINE)  suppository 12.5 mg     QUEtiapine (SEROquel) half-tab 12.5 mg     senna-docusate (SENOKOT-S/PERICOLACE) 8.6-50 MG per tablet 1 tablet    Or     senna-docusate (SENOKOT-S/PERICOLACE) 8.6-50 MG per tablet 2 tablet     sodium chloride (PF) 0.9% PF flush 3 mL     sodium chloride (PF) 0.9% PF flush 3 mL     sodium chloride 0.9% infusion     [START ON 12/31/2022] vancomycin (VANCOCIN) 1,250 mg in 0.9% NaCl 250 mL intermittent infusion     venlafaxine (EFFEXOR) tablet 75 mg     Current Outpatient Medications   Medication Sig     acetaminophen (TYLENOL) 500 MG tablet Take 1,000 mg by mouth 3 times daily And 1000mg daily PRN, max 4gm/day     atorvastatin (LIPITOR) 20 MG tablet Take 1 tablet (20 mg) by mouth every evening     carbidopa-levodopa (SINEMET)  MG tablet Take 1 tablet by mouth 3 times daily 0800, 1400, 2000     carboxymethylcellulose PF (REFRESH LIQUIGEL) 1 % ophthalmic gel Place 1 drop into both eyes 4 times daily     cycloSPORINE (RESTASIS) 0.05 % ophthalmic emulsion Place 1 drop into both eyes 2 times daily Sever dry eyes     loperamide (IMODIUM) 2 MG capsule Take 2 mg by mouth every 4 hours as needed (loose stools)     melatonin 3 MG tablet Take 3 mg by mouth At Bedtime      psyllium (METAMUCIL/KONSYL) capsule Take 1 capsule by mouth 2 times daily     QUEtiapine (SEROQUEL) 25 MG tablet Take 12.5 mg by mouth 2 times daily as needed (dementia with behavioral disturbance)     rivaroxaban ANTICOAGULANT (XARELTO) 15 MG TABS tablet Take 15 mg by mouth every morning     sennosides (SENOKOT) 8.6 MG tablet Take 1 tablet by mouth every 8 hours as needed for constipation     venlafaxine (EFFEXOR) 75 MG tablet Take 75 mg by mouth 3 times daily     vitamin D3 (CHOLECALCIFEROL) 50 mcg (2000 units) tablet Take 50 mcg by mouth every morning      White Petrolatum-Mineral Oil (REFRESH P.M.) OINT Place into both eyes At Bedtime Place a small pea size amount into lower conjunctival sac             Review of Systems:  "  Unable to complete ROS due to decreased mental state/obtundation.          Physical Exam:   All vitals have been reviewed  Patient Vitals for the past 24 hrs:   BP Temp Temp src Pulse Resp SpO2 Height Weight   12/30/22 0946 (!) 85/50 -- -- 72 15 94 % -- --   12/30/22 0926 -- 97.7  F (36.5  C) -- -- -- -- 1.829 m (6' 0.01\") 69.9 kg (154 lb 1.6 oz)   12/30/22 0912 (!) 79/48 -- -- 71 29 95 % -- --   12/30/22 0900 -- -- -- -- -- 99 % -- --   12/30/22 0857 (!) 78/55 -- -- 78 17 95 % -- --   12/30/22 0827 (!) 77/59 -- -- 80 12 91 % -- --   12/30/22 0812 90/59 -- -- 81 20 90 % -- --   12/30/22 0700 (!) 88/53 -- -- 80 22 (!) 85 % -- --   12/30/22 0600 101/67 -- -- 100 24 (!) 84 % -- --   12/30/22 0540 104/60 -- -- 97 13 98 % -- --   12/30/22 0530 98/66 -- -- 85 20 99 % -- --   12/30/22 0525 -- -- -- -- -- 99 % -- --   12/30/22 0520 109/71 -- -- 88 15 100 % -- --   12/30/22 0510 98/86 -- -- 108 12 100 % -- --   12/30/22 0450 (!) 84/67 -- -- 101 19 98 % -- --   12/30/22 0445 -- -- -- 92 24 98 % -- --   12/30/22 0440 (!) 88/54 -- -- 91 26 98 % -- --   12/30/22 0435 (!) 79/44 -- -- 93 25 98 % -- --   12/30/22 0430 (!) 82/50 -- -- 95 14 98 % -- --   12/30/22 0425 (!) 76/39 (!) 103  F (39.4  C) Axillary 99 26 98 % -- --   12/30/22 0420 (!) 73/54 -- -- 113 24 98 % -- --   12/30/22 0410 (!) 76/49 -- -- (!) 126 24 97 % -- --   12/30/22 0400 (!) 83/57 -- -- 102 24 96 % -- --   12/30/22 0355 -- -- -- -- -- 93 % -- --   12/30/22 0340 100/70 -- -- 120 23 (!) 88 % -- --   12/30/22 0300 (!) 101/92 -- -- 105 21 90 % -- --   12/30/22 0232 104/62 -- -- 113 27 (!) 84 % -- --   12/30/22 0148 -- -- -- 93 21 91 % -- --   12/30/22 0100 -- -- -- 77 27 93 % -- --   12/30/22 0000 -- -- -- 73 (!) 54 95 % -- --   12/29/22 2200 118/85 -- -- 68 (!) 9 96 % -- --   12/29/22 2140 -- -- -- 70 14 97 % -- --   12/29/22 2002 (!) 142/92 97.6  F (36.4  C) Temporal 62 18 99 % -- --     No intake or output data in the 24 hours ending 12/30/22 " 1026    Constitutional: Obtunded.   HEENT: Head normocephalic.  Respiratory: HFNC in place.  Cardiovascular: Regular rate and rhythm per pulses.  GI: Abdomen is non-distended.  Lymph/Hematologic: No lymphadenopathy in areas examined.  Skin: No rashes, no cyanosis, no edema.  Musculoskeletal: Right lower extremity: Leg is externally rotated and shortened. Skin intact but with healing excoriations of right anterior thigh. No apparent ecchymosis or erythema. Mild right thigh swelling compared to contralateral side. Does not grimace or move to light palpation of the thigh. Calf is soft, presumably nontender as no response to palpation. No apparent increased pain with passive ankle dorsiflexion and plantar flexion. Does actively wiggle toes when toe is squeezed or plantar foot is lightly touched. DP pulse palpable. Toes are warm and well-perfused.  Neurologic: GCS 6 (E1, V1, M4 - moves right foot when toe is squeezed)          Data:   All laboratory data reviewed  Results for orders placed or performed during the hospital encounter of 12/29/22   CT Head w/o Contrast     Status: None    Narrative    EXAM: CT HEAD W/O CONTRAST  LOCATION: [facility]  DATE/TIME: 12/29/2022 8:39 PM    INDICATION: Fall, trauma, pain.  COMPARISON: MRI 03/09/2021.  TECHNIQUE: Routine CT Head without IV contrast. Multiplanar reformats. Dose reduction techniques were used.    FINDINGS:  INTRACRANIAL CONTENTS: The previously demonstrated hemorrhage in the lateral right temporal lobe is no longer visualized. There is mild hypoattenuation at its former site, likely representing edema and/or gliosis. Right occipital sulcal subarachnoid   hemorrhage is also resolved. There is a new 1.3 cm focus of parenchymal hemorrhage in the right occipitoparietal region (image 19) with surrounding edema. The ill-defined appearance of this hemorrhage favors a subacute age. There is moderate scattered   white matter hypoattenuation, which is  nonspecific. This commonly reflects chronic small vessel ischemic change. There is a small chronic left parietal cortical infarction. Diffuse and proportionate prominence of the ventricles, sulci and cisterns is   compatible with moderate generalized parenchymal atrophy. The sella is unremarkable for technique. The cerebellar tonsils are appropriately positioned.     VISUALIZED ORBITS/SINUSES/MASTOIDS: Prior bilateral cataract surgery. Visualized portions of the orbits are otherwise unremarkable. No paranasal sinus mucosal disease. No middle ear or mastoid effusion.    BONES/SOFT TISSUES: No scalp hematoma. No skull fracture.      Impression    IMPRESSION:  1.  New 1.3 cm parenchymal hematoma in the right occipitoparietal region with surrounding vasogenic edema. The appearance of this favors a subacute age. There is mild localized mass effect without midline shift or herniation.  2.  Interval resolution of previously described hemorrhage in the lateral right temporal lobe.  3.  Resolution of previously demonstrated right occipital subarachnoid hemorrhage.  4.  No new intracranial hemorrhage demonstrated.  5.  No evidence of acute calvarial fracture.  6.  Age-related and chronic ischemic changes as above.    Results discussed with Dr. Mccurdy on 12/29/2022 9:00 PM.   CT Cervical Spine w/o Contrast     Status: None    Narrative    EXAM: CT CERVICAL SPINE W/O CONTRAST  LOCATION: [facility]  DATE/TIME: 12/29/2022 8:40 PM    INDICATION: Fall, trauma, pain.  COMPARISON: 11/17/2021.  TECHNIQUE: Routine CT Cervical Spine without IV contrast. Multiplanar reformats. Dose reduction techniques were used.    FINDINGS:  ALIGNMENT: Slight retrolisthesis at C5-C6, similar to prior. Symmetric facet alignment.    BONES: There is no evidence of acute displaced fractures. The occipital condyles appear intact. Vertebral body heights are unremarkable. No destructive bony lesions are apparent.    DISCS:  Moderate C5-C6 and C6-C7 spondylosis with intervertebral disc height loss and disc-osteophyte complex formation.    SPINAL CANAL: No high-grade stenosis.    NEURAL FORAMINA: Moderate to severe left C5-C6 and moderate left C6-C7 neural foraminal stenosis.    SOFT TISSUES: No prevertebral soft tissue thickening. Unremarkable paraspinal soft tissues.    UPPER THORAX: The visualized lung apices are well aerated.      Impression    IMPRESSION:  1.  No evidence of acute displaced fracture.  2.  No evidence of traumatic subluxation.  3.  Degenerative change.   XR Pelvis w Hip Right 1 View     Status: None    Narrative    EXAM: XR PELVIS AND HIP RIGHT 1 VIEW  LOCATION: [facility]  DATE/TIME: 12/29/2022 8:48 PM    INDICATION: Trauma, pain.  COMPARISON: None.      Impression    IMPRESSION: Intertrochanteric fracture of the right hip. No dislocation. Postoperative change left hip arthroplasty. Pelvis negative for fracture.   CT Head w/o Contrast     Status: None    Narrative    EXAM: CT HEAD W/O CONTRAST  LOCATION: [facility]  DATE/TIME: 12/30/2022 3:55 AM    INDICATION: Headache. Known intracranial hemorrhage.  COMPARISON: CT head 12/29/2022  TECHNIQUE: Routine CT Head without IV contrast. Multiplanar reformats. Dose reduction techniques were used.    FINDINGS:  INTRACRANIAL CONTENTS: Right occipital temporal region evolving acute to subacute hematoma measuring approximately 1.4 cm with surrounding edema similar compared to CT head 12/29/2022. No new or significant progressive acute intraparenchymal hemorrhages.   No significant midline shift.     No CT evidence of acute infarct. Moderate presumed chronic small vessel ischemic changes. Moderate generalized volume loss. No hydrocephalus. Left parietal lobe small chronic infarct.    VISUALIZED ORBITS/SINUSES/MASTOIDS: No intraorbital abnormality. No paranasal sinus mucosal disease. No middle ear or mastoid  effusion.    BONES/SOFT TISSUES: No acute abnormality.      Impression    IMPRESSION:  1.  No significant interval change compared to CT head 12/29/2022.     XR Chest Port 1 View     Status: None    Narrative    EXAM: XR CHEST PORT 1 VIEW  LOCATION: [facility]  DATE/TIME: 12/30/2022 3:46 AM    INDICATION: Possible aspiration.  COMPARISON: 01/12/2021      Impression    IMPRESSION:     Mild left hemidiaphragm elevation.    No focal airspace disease. No pleural effusion or pneumothorax.    Stable nonenlarged cardiomediastinal silhouette. Aortic calcifications.   Reva Draw     Status: None    Narrative    The following orders were created for panel order Reva Draw.  Procedure                               Abnormality         Status                     ---------                               -----------         ------                     Extra Blue Top Tube[[id number]]                              Final result               Extra Red Top Tube[[id number]]                               Final result               Extra Green Top (Lithium...[[id number]]                      Final result               Extra Purple Top Tube[[id number]]                            Final result               Extra Blood Bank Purple ...[[id number]]                      Final result                 Please view results for these tests on the individual orders.   Extra Blue Top Tube     Status: None   Result Value Ref Range    Hold Specimen JIC    Extra Red Top Tube     Status: None   Result Value Ref Range    Hold Specimen JIC    Extra Green Top (Lithium Heparin) Tube     Status: None   Result Value Ref Range    Hold Specimen JIC    Extra Purple Top Tube     Status: None   Result Value Ref Range    Hold Specimen JIC    Extra Blood Bank Purple Top Tube     Status: None   Result Value Ref Range    Hold Specimen JIC    Basic metabolic panel     Status: Abnormal   Result Value Ref Range    Sodium 138 133 - 144 mmol/L     Potassium 5.2 3.4 - 5.3 mmol/L    Chloride 106 94 - 109 mmol/L    Carbon Dioxide (CO2) 30 20 - 32 mmol/L    Anion Gap 2 (L) 3 - 14 mmol/L    Urea Nitrogen 25 7 - 30 mg/dL    Creatinine 0.97 0.66 - 1.25 mg/dL    Calcium 8.5 8.5 - 10.1 mg/dL    Glucose 95 70 - 99 mg/dL    GFR Estimate 75 >60 mL/min/1.73m2   CBC with platelets and differential     Status: Abnormal   Result Value Ref Range    WBC Count 10.2 4.0 - 11.0 10e3/uL    RBC Count 3.66 (L) 4.40 - 5.90 10e6/uL    Hemoglobin 12.6 (L) 13.3 - 17.7 g/dL    Hematocrit 36.6 (L) 40.0 - 53.0 %     78 - 100 fL    MCH 34.4 (H) 26.5 - 33.0 pg    MCHC 34.4 31.5 - 36.5 g/dL    RDW 11.9 10.0 - 15.0 %    Platelet Count 192 150 - 450 10e3/uL    % Neutrophils 58 %    % Lymphocytes 29 %    % Monocytes 10 %    % Eosinophils 2 %    % Basophils 0 %    % Immature Granulocytes 1 %    NRBCs per 100 WBC 0 <1 /100    Absolute Neutrophils 5.9 1.6 - 8.3 10e3/uL    Absolute Lymphocytes 3.0 0.8 - 5.3 10e3/uL    Absolute Monocytes 1.1 0.0 - 1.3 10e3/uL    Absolute Eosinophils 0.2 0.0 - 0.7 10e3/uL    Absolute Basophils 0.0 0.0 - 0.2 10e3/uL    Absolute Immature Granulocytes 0.1 <=0.4 10e3/uL    Absolute NRBCs 0.0 10e3/uL   Troponin I     Status: Normal   Result Value Ref Range    Troponin I High Sensitivity 26 <79 ng/L   UA with Microscopic reflex to Culture     Status: Abnormal    Specimen: Urine, Catheter   Result Value Ref Range    Color Urine Orange (A) Colorless, Straw, Light Yellow, Yellow    Appearance Urine Slightly Cloudy (A) Clear    Glucose Urine Negative Negative mg/dL    Bilirubin Urine Negative Negative    Ketones Urine Trace (A) Negative mg/dL    Specific Gravity Urine 1.027 1.003 - 1.035    Blood Urine Trace (A) Negative    pH Urine 5.5 5.0 - 7.0    Protein Albumin Urine 30 (A) Negative mg/dL    Urobilinogen Urine 2.0 Normal, 2.0 mg/dL    Nitrite Urine Negative Negative    Leukocyte Esterase Urine Moderate (A) Negative    Amorphous Crystals Urine Few (A) None Seen /HPF     RBC Urine 71 (H) <=2 /HPF    WBC Urine 140 (H) <=5 /HPF    Narrative    Urine Culture ordered based on laboratory criteria   Partial thromboplastin time     Status: Normal   Result Value Ref Range    aPTT 30 22 - 38 Seconds   INR     Status: Abnormal   Result Value Ref Range    INR 1.41 (H) 0.85 - 1.15   Lactic acid whole blood     Status: Abnormal   Result Value Ref Range    Lactic Acid 2.8 (H) 0.7 - 2.0 mmol/L   Lactic acid whole blood     Status: Normal   Result Value Ref Range    Lactic Acid 1.8 0.7 - 2.0 mmol/L   EKG 12-lead, tracing only     Status: None   Result Value Ref Range    Systolic Blood Pressure  mmHg    Diastolic Blood Pressure  mmHg    Ventricular Rate 66 BPM    Atrial Rate  BPM    AL Interval  ms    QRS Duration 72 ms     ms    QTc 421 ms    P Axis  degrees    R AXIS 43 degrees    T Axis 57 degrees    Interpretation ECG       Atrial fibrillation  Abnormal ECG  When compared with ECG of 12-JAN-2021 08:18,  No significant change was found  Confirmed by GENERATED REPORT, COMPUTER ([id number]),  Harley Cantu ([id number]) on 12/29/2022 10:05:56 PM     Adult Type and Screen     Status: None   Result Value Ref Range    ABO/RH(D) A POS     Antibody Screen Negative Negative    SPECIMEN EXPIRATION DATE [id number]    CBC with platelets differential     Status: Abnormal    Narrative    The following orders were created for panel order CBC with platelets differential.  Procedure                               Abnormality         Status                     ---------                               -----------         ------                     CBC with platelets and d...[[id number]]  Abnormal            Final result                 Please view results for these tests on the individual orders.   ABO/Rh type and screen     Status: None    Narrative    The following orders were created for panel order ABO/Rh type and screen.  Procedure                               Abnormality         Status                      ---------                               -----------         ------                     Adult Type and Screen[[id number]]                            Final result                 Please view results for these tests on the individual orders.        Attestation:  I have reviewed today's vital signs, notes, medications, labs and imaging with Dr. Bernardino Kumari.  Amount of time performed on this consult: 35 minutes.    Jacqueline Rodriguez PA-C  [facility]

## 2022-12-30 NOTE — ED NOTES
Called [facility], [phone], to confirm the patient's emergency contact information. The staff on duty were unable to provide information at this time. Jose Manuel FRANCIS notified.

## 2022-12-30 NOTE — PHARMACY-ADMISSION MEDICATION HISTORY
Pharmacy Medication History  Admission medication history interview status for the 12/29/2022  admission is complete. See EPIC admission navigator for prior to admission medications     Location of Interview: Outside patient room but on unit  Medication history sources: Medication list from [facility]    In the past week, patient estimated taking medication this percent of the time: greater than 90%    Medication reconciliation completed by provider prior to medication history? No    Time spent in this activity: 30 minutes    Prior to Admission medications    Medication Sig Last Dose Taking? Auth Provider Long Term End Date   acetaminophen (TYLENOL) 500 MG tablet Take 1,000 mg by mouth 3 times daily And 1000mg daily PRN, max 4gm/day 12/29/2022 at 1400 Yes Reported, Patient     atorvastatin (LIPITOR) 20 MG tablet Take 1 tablet (20 mg) by mouth every evening 12/28/2022 Yes Karmen Pederson MD Yes    carbidopa-levodopa (SINEMET)  MG tablet Take 1 tablet by mouth 3 times daily 0800, 1400, 2000 12/29/2022 at 1400 Yes Unknown, Entered By History Yes    carboxymethylcellulose PF (REFRESH LIQUIGEL) 1 % ophthalmic gel Place 1 drop into both eyes 4 times daily 12/29/2022 at 1600 Yes Unknown, Entered By History     cycloSPORINE (RESTASIS) 0.05 % ophthalmic emulsion Place 1 drop into both eyes 2 times daily Sever dry eyes 12/29/2022 at am Yes Unknown, Entered By History     loperamide (IMODIUM) 2 MG capsule Take 2 mg by mouth every 4 hours as needed (loose stools) as needed Yes Unknown, Entered By History     melatonin 3 MG tablet Take 3 mg by mouth At Bedtime  12/28/2022 Yes Unknown, Entered By History     psyllium (METAMUCIL/KONSYL) capsule Take 1 capsule by mouth 2 times daily 12/29/2022 at am Yes Unknown, Entered By History     QUEtiapine (SEROQUEL) 25 MG tablet Take 12.5 mg by mouth 2 times daily as needed (dementia with behavioral disturbance) as needed Yes Unknown, Entered By History Yes    rivaroxaban  ANTICOAGULANT (XARELTO) 15 MG TABS tablet Take 15 mg by mouth every morning 12/28/2022 Yes Unknown, Entered By History No    sennosides (SENOKOT) 8.6 MG tablet Take 1 tablet by mouth every 8 hours as needed for constipation as needed Yes Unknown, Entered By History     venlafaxine (EFFEXOR) 75 MG tablet Take 75 mg by mouth 3 times daily 12/29/2022 at 1400 Yes Unknown, Entered By History No    vitamin D3 (CHOLECALCIFEROL) 50 mcg (2000 units) tablet Take 50 mcg by mouth every morning  12/29/2022 at am Yes Unknown, Entered By History     White Petrolatum-Mineral Oil (REFRESH P.M.) OINT Place into both eyes At Bedtime Place a small pea size amount into lower conjunctival sac 12/28/2022 Yes Unknown, Entered By History         The information provided in this note is only as accurate as the sources available at the time of update(s)

## 2022-12-30 NOTE — CONSULTS
Consult Date: 12/29/2022      IMPRESSION:  An 88-year-old male, anticoagulated on Xarelto, presenting to the emergency room after a fall.  Head CT revealing a subacute right parietooccipital hemorrhage.    PLAN:  From the neurosurgical standpoint, since the hemorrhage is subacute, we do react recommend any reversal of his coagulopathy.  Blood pressure less than 150 systolic, every 4-hour neuro checks on the neuro floor.  Repeat head CT tomorrow morning.    TYPE OF VISIT:  The neurosurgical service was consulted see Mr. Lovell for intracranial hemorrhage.    HISTORY OF PRESENT ILLNESS:  Mr. Lovell is an 88-year-old right-handed male, anticoagulated on Xarelto with a past medical history of chronic atrial fibrillation, Alzheimer's, Parkinson's disease, frequent falls, and prior stroke, who presents to the emergency room after a fall and hip pain.  Apparently, the patient had an unwitnessed fall at the assisted living facility today. When he continued to complain of right hip pain, EMS was called and he presented to the emergency room where head CT revealed subacute hemorrhage and neurosurgery was consulted.  The patient denies headache.    PAST MEDICAL HISTORY:  Intracranial hemorrhage, Alzheimer's disease, stage III kidney disease, chronic AFib, COPD, depression, frequent falls, Parkinson's disease, dementia, emphysema.    PAST SURGICAL HISTORY:  Left hip open reduction and internal fixation, tonsillectomy, bilateral cataract removal and bilateral capsulotomy.    FAMILY HISTORY:  Depression.    SOCIAL HISTORY:  He lives in an assisted living facility.    MEDICATIONS:  Reviewed per the electronic medical record, including Xarelto.    ALLERGIES:  FLUOXETINE.    PHYSICAL EXAMINATION:    VITAL SIGNS:  Temperature is 97.6, blood pressure is 142/92, pulse is 62, and respiratory rate is 18.  GENERAL:  He is awake.  He is alert and orientated to person and place, but not time.  He is cooperative with the exam.  He is  able to lift both arms up in the air with a negative pronator drift.  He is able to wiggle his feet. Right leg is inverted. Pupils are equal, round, and reactive.  Bleeding noted from the oral cavity.  [place] coma scale 15.  Visual field testing difficult as he closes his eyes or turns his head.    IMAGING STUDIES:  Included a head CT showing a 1.3 cm parenchymal hematoma in the right occipital parietal region with surrounding vasogenic edema favored to be subacute. Mild localized mass effect.  Of note, there is resolution of the previously noted hemorrhage in the lateral right temporal lobe, resolution of the previously noted right occipital subarachnoid hemorrhage.  CT of the cervical spine does not reveal any acute findings.  X-ray of the hip shows a right hip fracture.    LABORATORY DATA:  INR is 1.41.  His PTT is 30.  White count is 10.2, platelets 192.  Sodium is 138.    TOTAL TIME SPENT WITH THE PATIENT:  70 minutes, including 50 minutes of counseling and coordination of care.  Discussed with Dr. Javier.    Dictated by FILIPE BUSTAMANTE        D: 2022   T: 2022   MT: RANCHO    Name:     NADIYA LARES  MRN:      [id number]        Account:      [id number]   :      1934           Consult Date: 2022     Document: [id number]

## 2022-12-30 NOTE — PROGRESS NOTES
[facility]  Hospitalist Progress Note  [facility]        Date of Service (when I saw the patient): 12/30/2022        Interval History:      Talked with sister via telephone, wants comfort measures.          Assessment and Plan:        Maricarmen Lovell is a 88 year old male with past medical history significant for Alzheimer's Disease, Chronic atrial fibrillation on anticoagulation, CKD stage III, HTN, COPD, depression, and frequent falls admitted on 12/29/2022 after a fall. He is found to have a new intertrochanteric fracture of the right hip. as well as a probable subacute 1.3cm parenchymal hematoma in the right occipitoparietal region.      Acute hypoxic respiratory failure 2/2 aspiration event  - Comfort cares.      Severe Sepsis likely 2/2 UTI vs Aspiration vs other   - Comfort cares.      Right intertrochanteric hip fracture   Unwitnessed fall   Pt suffered an unwitnessed fall at his assisted living facility. Circumstances of the fall are unclear. He complained of right hip pain. XR showed Intertrochanteric fracture of the right hip. No dislocation. Postoperative change left hip arthroplasty. Pelvis negative for fracture.   - Comfort cares.      Intraparenchymal hematoma, likely subacute, with surrounding vasogenic edema and mild localized mass effect.   Hx of prior head bleed (October 2022, September 2020)    Concern for cerebral amyloid angiopathy   Pt has a hx of both IPH as well as SAH in October of 2022. He was evaluated by neurology who thought this was likely related to amyloid angiopathy. His anticoagulation was held for a period of time, but then ultimately resumed. CT head obtained this admission showed new 1.3 cm parenchymal hematoma in the right occipitoparietal region with surrounding vasogenic edema. The appearance of this favors a subacute age. There is mild localized mass effect without midline shift or herniation. There has been interval resolution of  previously described hemorrhage in the lateral right temporal lobe as well as resolution of the previously demonstrated right occipital subarachnoid hemorrhage.   - Comfort cares.      Chronic atrial fibrillation on anticoagulation   - Comfort cares.      Mild normocytic anemia   - Comfort cares.      Acute toxic metabolic encephalopathy   Alzheimer's Dementia   Parkinsonism  Frequent Falls   Depression/Anxiety  - Comfort cares.     Disposition: Comfort cares.     Solorio Catheter: Not present      Diet: Orders Placed This Encounter      NPO for Medical/Clinical Reasons Except for: Meds, Ice Chips      Code: No CPR- Do NOT Intubate    Length of Stay:  LOS: 1 day /LOS: 1    Dr. Bigg Bentley DO, MARIAMA, MHA  [facility] Hospitalist  Pager [phone]    Bigg Bentley DO  [facility]  Securely message with the Vocera Web Console (learn more here)  Text page via [employer] Paging/Directory      Clinically Significant Risk Factors Present on Admission               # Drug Induced Coagulation Defect: home medication list includes an anticoagulant medication      # Non-Invasive mechanical ventilation: current O2 Device: High Flow Nasal Cannula (HFNC)  # Acute hypoxic respiratory failure: continue supplemental O2 as needed  # Dementia: noted on problem list                               Physical Exam:           Blood pressure (!) 88/53, pulse 80, temperature (!) 103  F (39.4  C), temperature source Axillary, resp. rate 22, SpO2 (!) 85 %.    Vital Sign Ranges  Temperature Temp  Av.3  F (37.9  C)  Min: 97.6  F (36.4  C)  Max: 103  F (39.4  C)   Blood pressure Systolic (24hrs), Av , Min:73 , Max:142        Diastolic (24hrs), Av, Min:39, Max:92      Pulse Pulse  Av.8  Min: 62  Max: 126   Respirations Resp  Av.9  Min: 9  Max: 54   Pulse oximetry SpO2  Av %  Min: 84 %  Max: 100 %     Vital Signs with Ranges  Temp:  [97.6  F (36.4  C)-103  F (39.4  C)] 103  F (39.4   C)  Pulse:  [] 80  Resp:  [9-54] 22  BP: ()/(39-92) 88/53  FiO2 (%):  [40 %-90 %] 50 %  SpO2:  [84 %-100 %] 85 %  Temp:  [97.6  F (36.4  C)-103  F (39.4  C)] 103  F (39.4  C)  Pulse:  [] 80  Resp:  [9-54] 22  BP: ()/(39-92) 88/53  FiO2 (%):  [40 %-90 %] 50 %  SpO2:  [84 %-100 %] 85 %    I/O Last 3 Shifts:   No intake/output data recorded.    I/O past 24 hours:   No intake or output data in the 24 hours ending 12/30/22 0850    Oxygen  Temp: (!) 103  F (39.4  C) Temp src: Axillary BP: (!) 88/53 Pulse: 80   Resp: 22 SpO2: (!) 85 % O2 Device: High Flow Nasal Cannula (HFNC) Oxygen Delivery: 40 LPM  There were no vitals filed for this visit.    Lines  Peripheral IV 12/29/22 Anterior;Distal;Left Lower forearm (Active)   Number of days: 1       Peripheral IV 12/30/22 Anterior;Distal;Right Lower forearm (Active)   Number of days: 0           GENERAL: Lying in bed, tremulous. Confused, does not respond.   HEENT: Normocephalic. Nares normal.   LUNGS: No dyspnea at rest.   HEART: Extremities perfused.   NEUROLOGIC: Does not follow commands. R leg shortened and externally rotated                  Prior to Admission Medications:      (Not in a hospital admission)           Medications:        Current Facility-Administered Medications   Medication Last Rate     - MEDICATION INSTRUCTIONS -       sodium chloride 75 mL/hr at 12/30/22 0025     Current Facility-Administered Medications   Medication Dose Route Frequency     carbidopa-levodopa  1 tablet Oral TID     piperacillin-tazobactam  3.375 g Intravenous Q6H     senna-docusate  1 tablet Oral BID    Or     senna-docusate  2 tablet Oral BID     sodium chloride (PF)  3 mL Intracatheter Q8H     vancomycin  1,750 mg Intravenous Once     venlafaxine  75 mg Oral TID     Current Facility-Administered Medications   Medication Dose Route Frequency     acetaminophen  650 mg Oral Q4H PRN    Or     acetaminophen  650 mg Per NG tube Q4H PRN    Or     acetaminophen  650  mg Rectal Q4H PRN     HYDROmorphone  0.2 mg Intravenous Q2H PRN     lidocaine 4%   Topical Q1H PRN     lidocaine (buffered or not buffered)  0.1-1 mL Other Q1H PRN     - MEDICATION INSTRUCTIONS -   Does not apply Continuous PRN     nitroGLYcerin  0.4 mg Sublingual Q5 Min PRN     ondansetron  4 mg Oral Q6H PRN    Or     ondansetron  4 mg Intravenous Q6H PRN     oxyCODONE IR  2.5 mg Oral Q4H PRN     prochlorperazine  5 mg Intravenous Q6H PRN    Or     prochlorperazine  5 mg Oral Q6H PRN    Or     prochlorperazine  12.5 mg Rectal Q12H PRN     QUEtiapine  12.5 mg Oral BID PRN     sodium chloride (PF)  3 mL Intracatheter q1 min prn     ___________________________________________________________________________  Medications     - MEDICATION INSTRUCTIONS -       sodium chloride 75 mL/hr at 12/30/22 0025       carbidopa-levodopa  1 tablet Oral TID     piperacillin-tazobactam  3.375 g Intravenous Q6H     senna-docusate  1 tablet Oral BID    Or     senna-docusate  2 tablet Oral BID     sodium chloride (PF)  3 mL Intracatheter Q8H     vancomycin  1,750 mg Intravenous Once     venlafaxine  75 mg Oral TID          Data:      Lab data reviewed.     Data   Recent Labs   Lab 12/29/22 2014   WBC 10.2   HGB 12.6*         INR 1.41*      POTASSIUM 5.2   CHLORIDE 106   CO2 30   BUN 25   CR 0.97   ANIONGAP 2*   BEATRIZ 8.5   GLC 95           Imaging:      Imaging data reviewed.     Recent Results (from the past 24 hour(s))   CT Head w/o Contrast    Narrative    EXAM: CT HEAD W/O CONTRAST  LOCATION: [facility]  DATE/TIME: 12/29/2022 8:39 PM    INDICATION: Fall, trauma, pain.  COMPARISON: MRI 03/09/2021.  TECHNIQUE: Routine CT Head without IV contrast. Multiplanar reformats. Dose reduction techniques were used.    FINDINGS:  INTRACRANIAL CONTENTS: The previously demonstrated hemorrhage in the lateral right temporal lobe is no longer visualized. There is mild hypoattenuation at its former site,  likely representing edema and/or gliosis. Right occipital sulcal subarachnoid   hemorrhage is also resolved. There is a new 1.3 cm focus of parenchymal hemorrhage in the right occipitoparietal region (image 19) with surrounding edema. The ill-defined appearance of this hemorrhage favors a subacute age. There is moderate scattered   white matter hypoattenuation, which is nonspecific. This commonly reflects chronic small vessel ischemic change. There is a small chronic left parietal cortical infarction. Diffuse and proportionate prominence of the ventricles, sulci and cisterns is   compatible with moderate generalized parenchymal atrophy. The sella is unremarkable for technique. The cerebellar tonsils are appropriately positioned.     VISUALIZED ORBITS/SINUSES/MASTOIDS: Prior bilateral cataract surgery. Visualized portions of the orbits are otherwise unremarkable. No paranasal sinus mucosal disease. No middle ear or mastoid effusion.    BONES/SOFT TISSUES: No scalp hematoma. No skull fracture.      Impression    IMPRESSION:  1.  New 1.3 cm parenchymal hematoma in the right occipitoparietal region with surrounding vasogenic edema. The appearance of this favors a subacute age. There is mild localized mass effect without midline shift or herniation.  2.  Interval resolution of previously described hemorrhage in the lateral right temporal lobe.  3.  Resolution of previously demonstrated right occipital subarachnoid hemorrhage.  4.  No new intracranial hemorrhage demonstrated.  5.  No evidence of acute calvarial fracture.  6.  Age-related and chronic ischemic changes as above.    Results discussed with Dr. Mccurdy on 12/29/2022 9:00 PM.   CT Cervical Spine w/o Contrast    Narrative    EXAM: CT CERVICAL SPINE W/O CONTRAST  LOCATION: [facility]  DATE/TIME: 12/29/2022 8:40 PM    INDICATION: Fall, trauma, pain.  COMPARISON: 11/17/2021.  TECHNIQUE: Routine CT Cervical Spine without IV contrast.  Multiplanar reformats. Dose reduction techniques were used.    FINDINGS:  ALIGNMENT: Slight retrolisthesis at C5-C6, similar to prior. Symmetric facet alignment.    BONES: There is no evidence of acute displaced fractures. The occipital condyles appear intact. Vertebral body heights are unremarkable. No destructive bony lesions are apparent.    DISCS: Moderate C5-C6 and C6-C7 spondylosis with intervertebral disc height loss and disc-osteophyte complex formation.    SPINAL CANAL: No high-grade stenosis.    NEURAL FORAMINA: Moderate to severe left C5-C6 and moderate left C6-C7 neural foraminal stenosis.    SOFT TISSUES: No prevertebral soft tissue thickening. Unremarkable paraspinal soft tissues.    UPPER THORAX: The visualized lung apices are well aerated.      Impression    IMPRESSION:  1.  No evidence of acute displaced fracture.  2.  No evidence of traumatic subluxation.  3.  Degenerative change.   XR Pelvis w Hip Right 1 View    Narrative    EXAM: XR PELVIS AND HIP RIGHT 1 VIEW  LOCATION: [facility]  DATE/TIME: 12/29/2022 8:48 PM    INDICATION: Trauma, pain.  COMPARISON: None.      Impression    IMPRESSION: Intertrochanteric fracture of the right hip. No dislocation. Postoperative change left hip arthroplasty. Pelvis negative for fracture.   XR Chest Port 1 View    Narrative    EXAM: XR CHEST PORT 1 VIEW  LOCATION: [facility]  DATE/TIME: 12/30/2022 3:46 AM    INDICATION: Possible aspiration.  COMPARISON: 01/12/2021      Impression    IMPRESSION:     Mild left hemidiaphragm elevation.    No focal airspace disease. No pleural effusion or pneumothorax.    Stable nonenlarged cardiomediastinal silhouette. Aortic calcifications.   CT Head w/o Contrast    Narrative    EXAM: CT HEAD W/O CONTRAST  LOCATION: [facility]  DATE/TIME: 12/30/2022 3:55 AM    INDICATION: Headache. Known intracranial hemorrhage.  COMPARISON: CT head  12/29/2022  TECHNIQUE: Routine CT Head without IV contrast. Multiplanar reformats. Dose reduction techniques were used.    FINDINGS:  INTRACRANIAL CONTENTS: Right occipital temporal region evolving acute to subacute hematoma measuring approximately 1.4 cm with surrounding edema similar compared to CT head 12/29/2022. No new or significant progressive acute intraparenchymal hemorrhages.   No significant midline shift.     No CT evidence of acute infarct. Moderate presumed chronic small vessel ischemic changes. Moderate generalized volume loss. No hydrocephalus. Left parietal lobe small chronic infarct.    VISUALIZED ORBITS/SINUSES/MASTOIDS: No intraorbital abnormality. No paranasal sinus mucosal disease. No middle ear or mastoid effusion.    BONES/SOFT TISSUES: No acute abnormality.      Impression    IMPRESSION:  1.  No significant interval change compared to CT head 12/29/2022.         Dr. Bigg Bentley DO, MBA, [facility] and Home[facility]  Pager [phone]

## 2022-12-30 NOTE — ED NOTES
Able to contact his sister,notified present condition of the patient,she said that she wants to talk to the doctor.

## 2022-12-30 NOTE — ED NOTES
[facility]  ED Nurse Handoff Report    ED Chief complaint: Fall and Hip Pain      ED Diagnosis:   Final diagnoses:   None       Code Status: DNR / DNI    Allergies:   Allergies   Allergen Reactions     Pecan [Nuts] Anaphylaxis     Fluoxetine Nausea     Juglans        Patient Story: Pt presents from assisted living via EMS after an unwitnessed fall complaining of right hip pain.  Focused Assessment:  Pt presents via EMS after an unwitnessed fall.  EMS report pt did not hit his head but pt has blood on teeth.  EMS report pt complains of right hip pain, right leg shortened and externally rotated.  Per assisted living staff pt had confusion that started this morning prior to his fall.  Pt is a poor historian and minimally verbal at this time.  Pt given 50 mcg of fentanyl en route by EMS.  Pt has a interchanteric fracture of his right hip and a new 1.3 parenchymal hematoma.  Pt also has a UTI.  Pt is DNR/ DNI per POLST, unable to contact family, message left by MD. Plans for admission.      Treatments and/or interventions provided: See above  Patient's response to treatments and/or interventions: See above    To be done/followed up on inpatient unit:  NA    Does this patient have any cognitive concerns?: Alzheimer's, Disoriented to place, Disoriented to situation and Disorientation to person    Activity level - Baseline/Home:  Independent  Activity Level - Current:   Total Care    Patient's Preferred language: English   Needed?: No    Isolation: ESBL in urine  Infection: Not Applicable  Patient tested for COVID 19 prior to admission: No  Bariatric?: No    Vital Signs:   Vitals:    12/29/22 2002 12/29/22 2140   BP: (!) 142/92    Pulse: 62 70   Resp: 18 14   Temp: 97.6  F (36.4  C)    TempSrc: Temporal    SpO2: 99% 97%       Cardiac Rhythm:     Was the PSS-3 completed:   Yes  What interventions are required if any?               Family Comments: NA  OBS brochure/video discussed/provided to  patient/family: N/A              Name of person given brochure if not patient: NA              Relationship to patient: NA    For the majority of the shift this patient's behavior was Green.   Behavioral interventions performed were NA.    ED NURSE PHONE NUMBER: [phone]

## 2022-12-30 NOTE — ED NOTES
Updated Dr EDISON Bentley about pt having only blood in external catheter, bladder scanned for 275cc

## 2022-12-30 NOTE — PHARMACY-VANCOMYCIN DOSING SERVICE
"Pharmacy Vancomycin Initial Note  Date of Service 2022  Patient's  1934  88 year old, male    Indication: Sepsis    Current estimated CrCl = Estimated Creatinine Clearance: 52 mL/min (based on SCr of 0.97 mg/dL).    Creatinine for last 3 days  2022:  8:14 PM Creatinine 0.97 mg/dL    Recent Vancomycin Level(s) for last 3 days  No results found for requested labs within last 72 hours.      Vancomycin IV Administrations (past 72 hours)                   vancomycin (VANCOCIN) 1,750 mg in 0.9% NaCl 500 mL intermittent infusion (mg) 1,750 mg Given 22 0722                Nephrotoxins and other renal medications (From now, onward)    Start     Dose/Rate Route Frequency Ordered Stop    22 07  vancomycin (VANCOCIN) 1,250 mg in 0.9% NaCl 250 mL intermittent infusion         1,250 mg  over 90 Minutes Intravenous EVERY 24 HOURS 22 1016      22 0500  piperacillin-tazobactam (ZOSYN) 3.375 g vial to attach to  mL bag        Note to Pharmacy: For [facility], [facility] and [facility]: For Zosyn-naive patients, use the \"Zosyn initial dose + extended infusion\" order panel.    3.375 g  over 30 Minutes Intravenous EVERY 6 HOURS 22 0428            Contrast Orders - past 72 hours (72h ago, onward)    None          [employer] Prediction of Planned Initial Vancomycin Regimen  Loading dose: 1750 mg x 1 (given at 0722 on 22)  Regimen: 1250 mg IV every 24 hours.  Start time: 730 on 2022  Exposure target: AUC24 (range)400-600 mg/L.hr   AUC24,ss: 543 mg/L.hr  Probability of AUC24 > 400: 82 %  Ctrough,ss: 16.4 mg/L  Probability of Ctrough,ss > 20: 31 %  Probability of nephrotoxicity (Lodise TWIN ): 12 %          Plan:  1. Start vancomycin  1250 mg IV q24h.   2. Vancomycin monitoring method: AUC  3. Vancomycin therapeutic monitoring goal: 400-600 mg*h/L  4. Pharmacy will check vancomycin levels as appropriate in 1-3 Days.    5. Serum creatinine levels will be ordered daily for the first " week of therapy and at least twice weekly for subsequent weeks.      Mihaela Bonilla, [facility]

## 2022-12-30 NOTE — CONSULTS
"Referral Received - [facility]         [facility] would like to thank you for the [facility]  [facility]osp[facility] referral.    Referral received.and initial insurance information sent to  Hospice intake.    We are reviewing your patient's eligibility with intake team and medical director at this time.    Our plan is to visit your patient as soon as possible. We will update the Charge Nurse on the unit with [facility] eligibility.    Update: Called patient sister at this time and left . Awaiting call back. Will head to bedside to assess eligibility.     Update 6:15pm: Spoke w/ pt sister \"Arlene\". Discussed IP hospice and patient prognosis. Arlene is currently in [place], but will sign electronic consents for IP hospice either tonight or tomorrow. Tentative plans to transition patient onto IP hospice in the AM if patient is still with us and once consents are signed. Will coordinate discharge plan w/ previous facility if patient stabilizes enough for discharge. Family had questions regarding prognosis and requested MD to call w/ an update. Nurse Lawanda updated and to contact MD.     Madhavi Kumari RN  [facility]  Contact information available via [facility] Paging/Directory     Listed as [facility] in [facility]  "

## 2022-12-30 NOTE — ED NOTES
The writer spoke to assisted living staff who stated the pt had been confused since this am prior to his fall.

## 2022-12-30 NOTE — ED NOTES
X-ray at bedside.    Pt on 15 LPM/oxymask and saturating at 85-88%.  RT at bedside to place pt on HFNC.    Zarina Ibarra RN,.......................................... 12/30/2022   3:30 AM

## 2022-12-30 NOTE — ED NOTES
Pageamadou Richard regarding low SBP's (70's) and increasing temperature (103.F) at this time 2 hrs following Suppository 650 MG Tylenol.  Pt has improved oxygenation following HFNC with     Received VORB to infuse 2L NS Bolus; Ice therapy to cool pt.    Zarina Ibarra RN,.......................................... 12/30/2022   4:28 AM

## 2022-12-30 NOTE — ED PROVIDER NOTES
History   Chief Complaint:  Fall and Hip Pain     The history is provided by the [facility]. The history is limited by the condition of the patient (dementia).      Maricarmen Lovell is a 88 year old male on Xarelto with history of Alzheimer's disease, frequent falls, CKD, chronic A-fib, COPD, hyperlipidemia, Parkinson's disease, skin cancer, and acute encephalopathy who presents after a fall with hip pain.     Patient reportedly suffered an unwitnessed fall at his assisted living facility. Per triage note, patient was complaining to EMS of right hip pain. He reportedly denied hitting his head to EMS. Patient is reportedly at baseline mentation per facility staff. Patient is reportedly DNR/DNI status.    Review of Systems   Unable to perform ROS: Dementia   Musculoskeletal: Positive for arthralgias (R hip).   All other systems reviewed and are negative.        Allergies:  Fluoxetine  Juglans    Medications:  Lipitor  Melatonin  Seroquel  Xarelto  Effexor XR  Sinemet  Imodium  Apresoline    Past Medical History:     Intracranial hemorrhage   Late onset Alzheimer's disease     CKD, stage 3  Chronic a-fib   COPD   Depression  Frequent falls  Slow transit constipation  Parkinson's disease  Insomnia   Hyperlipidemia   Dementia  COVID-19 infection  Acute encephalopathy  Long term (current) use of anticoagulants  Hypercalcemia  Skin cancer  Emphysema     Past Surgical History:    Left hip ORIF   Tonsillectomy   Bilateral cataract removal  Bilateral capsulotomy     Family History:    Mother: depression    Social History:  The patient presents to the ED alone via EMS  PCP: Enrique Russell MD    Physical Exam     Patient Vitals for the past 24 hrs:   BP Temp Temp src Pulse Resp SpO2   12/29/22 2140 -- -- -- 70 14 97 %   12/29/22 2002 (!) 142/92 97.6  F (36.4  C) Temporal 62 18 99 %     Physical Exam  Vitals: reviewed by me  General: Pt seen on [facility], pleasant but does appear to be confused.  Eyes: clear  conjunctiva BL  ENT: MMM, midline trachea.  Does have blood on his central incisors and upper gumline   Lungs: No tachypnea, no accessory muscle use. No respiratory distress.   CV: Rate as above  Abd: Soft, non tender, no guarding, no rebound. Non distended  MSK: no joint effusion.  Does have a positive right leg straight leg roll and tenderness to the right hip, no skin breaks, distal extremity warm and well-perfused.  Skin: No rash  Neuro: No facial droop, resting comfortably but arousable  Psych: Not RIS, no e/o AH/VH    Emergency Department Course   ECG  ECG results from 12/29/22   EKG 12-lead, tracing only     Value    Systolic Blood Pressure     Diastolic Blood Pressure     Ventricular Rate 66    Atrial Rate     WV Interval     QRS Duration 72        QTc 421    P Axis     R AXIS 43    T Axis 57    Interpretation ECG      Atrial fibrillation  Abnormal ECG  When compared with ECG of 12-JAN-2021 08:18,  No significant change was found  Confirmed by GENERATED REPORT, COMPUTER ([id number]),  Harley Cantu ([id number]) on 12/29/2022 10:05:56 PM       Imaging:  XR Pelvis w Hip Right 1 View   Final Result   IMPRESSION: Intertrochanteric fracture of the right hip. No dislocation. Postoperative change left hip arthroplasty. Pelvis negative for fracture.      CT Cervical Spine w/o Contrast   Final Result   IMPRESSION:   1.  No evidence of acute displaced fracture.   2.  No evidence of traumatic subluxation.   3.  Degenerative change.      CT Head w/o Contrast   Final Result   IMPRESSION:   1.  New 1.3 cm parenchymal hematoma in the right occipitoparietal region with surrounding vasogenic edema. The appearance of this favors a subacute age. There is mild localized mass effect without midline shift or herniation.   2.  Interval resolution of previously described hemorrhage in the lateral right temporal lobe.   3.  Resolution of previously demonstrated right occipital subarachnoid hemorrhage.   4.  No new intracranial  hemorrhage demonstrated.   5.  No evidence of acute calvarial fracture.   6.  Age-related and chronic ischemic changes as above.      Results discussed with Dr. Mccurdy on 12/29/2022 9:00 PM.        Report per radiology    Laboratory:  Labs Ordered and Resulted from Time of ED Arrival to Time of ED Departure   BASIC METABOLIC PANEL - Abnormal       Result Value    Sodium 138      Potassium 5.2      Chloride 106      Carbon Dioxide (CO2) 30      Anion Gap 2 (*)     Urea Nitrogen 25      Creatinine 0.97      Calcium 8.5      Glucose 95      GFR Estimate 75     CBC WITH PLATELETS AND DIFFERENTIAL - Abnormal    WBC Count 10.2      RBC Count 3.66 (*)     Hemoglobin 12.6 (*)     Hematocrit 36.6 (*)           MCH 34.4 (*)     MCHC 34.4      RDW 11.9      Platelet Count 192      % Neutrophils 58      % Lymphocytes 29      % Monocytes 10      % Eosinophils 2      % Basophils 0      % Immature Granulocytes 1      NRBCs per 100 WBC 0      Absolute Neutrophils 5.9      Absolute Lymphocytes 3.0      Absolute Monocytes 1.1      Absolute Eosinophils 0.2      Absolute Basophils 0.0      Absolute Immature Granulocytes 0.1      Absolute NRBCs 0.0     ROUTINE UA WITH MICROSCOPIC REFLEX TO CULTURE - Abnormal    Color Urine Orange (*)     Appearance Urine Slightly Cloudy (*)     Glucose Urine Negative      Bilirubin Urine Negative      Ketones Urine Trace (*)     Specific Gravity Urine 1.027      Blood Urine Trace (*)     pH Urine 5.5      Protein Albumin Urine 30 (*)     Urobilinogen Urine 2.0      Nitrite Urine Negative      Leukocyte Esterase Urine Moderate (*)     Amorphous Crystals Urine Few (*)     RBC Urine 71 (*)     WBC Urine 140 (*)    INR - Abnormal    INR 1.41 (*)    TROPONIN I - Normal    Troponin I High Sensitivity 26     PARTIAL THROMBOPLASTIN TIME - Normal    aPTT 30     TYPE AND SCREEN, ADULT    ABO/RH(D) A POS      Antibody Screen Negative      SPECIMEN EXPIRATION DATE [id number]     URINE CULTURE   BLOOD  CULTURE   BLOOD CULTURE   ABO/RH TYPE AND SCREEN      Emergency Department Course:     Reviewed:  I reviewed nursing notes, vitals, past medical history and Care Everywhere    Assessments:  2009 I obtained history and examined the patient as noted above.     Consults:  2058 I spoke with radiology regarding the patient.  2113 I spoke with Bree Bajwa PA-C, neurosurgery, regarding the patient.  2131 I left a voicemail for the patient's sibling describing the patient's current medical situation.  2209 I spoke with Dr. Richard of the hospitalist service, who agreed to admit the patient.     Interventions:  2233 Rocephin 2 g IV    Disposition:  The patient was admitted to the hospital under the care of Dr. Richard.     Impression & Plan     Medical Decision Making:  This is a pleasant 88-year-old male with multiple issues.  He does seem to have a subacute head bleed, and based on my conversations with neurosurgery, we have elected to not give reversal agent at this time for the DOAC that it seems as though he is taking, though of course we will hold this medication.  Reportedly he was acting normally before he received the fentanyl, so this may be why he is more somnolent.  He also has a hip fracture, which anticipate will likely be treated not based on his POLST which confirms DNR/DNI but states that IV medications are allowed.  We will plan for antibiotics to treat his UTI as well.  Regarding his fracture, there is no evidence of compartment syndrome or skin tenting, and his right lower extremity is neurovascularly intact.  I have try to get a hold of family, though I have been unsuccessful, and I have left a voicemail and have called all of his contacts without luck unfortunately.    Will plan for admission to the care of Dr. Richard of the hospitalist team who is very kindly agreed to accept care of the patient.  Social work can continue to try and find his contact, at this point I will follow neurosurgery's  recommendations for repeat CT scan in the morning, as well as blood pressure control with a systolic under 150mmhg.  He can be evaluated by orthopedics tomorrow for possible operative management, though again unclear what his goals of care are and social work may be helpful in obtaining this as well.  We will plan for careful monitoring and admission as above    Critical Care Time: was 30 minutes for this patient excluding procedures    Diagnosis:    ICD-10-CM    1. Intraparenchymal hematoma of brain, with unknown loss of consciousness status, unspecified laterality, initial encounter  S06.33AA       2. Urinary tract infection with hematuria, site unspecified  N39.0     R31.9       3. Closed fracture of right hip, initial encounter (H)  S72.001A       4. Fall, initial encounter  W19.XXXA         Scribe Disclosure:  I, Pamela Patterson, am serving as a scribe at 8:09 PM on 12/29/2022 to document services personally performed by Ramirez Mccurdy MD based on my observations and the provider's statements to me.          Ramirez Mccurdy MD  12/30/22 0016

## 2022-12-30 NOTE — H&P
[facility]    History and Physical - Hospitalist Service       Date of Admission:  12/29/2022    Assessment & Plan      Maricarmen Lovell is a 88 year old male with past medical history significant for Alzheimer's Disease, Chronic atrial fibrillation on anticoagulation, CKD stage III, HTN, COPD, depression, and frequent falls admitted on 12/29/2022 after a fall. He is found to have a new intertrochanteric fracture of the right hip. as well as a probable subacute 1.3cm parenchymal hematoma in the right occipitoparietal region.     ADDENDUM  Paged overnight that patient spiked a fever to 101 degF and was becoming more agitated. Tylenol was given for fever. While on the phone with the nurse the patient started vomiting.   I assessed the patient at bedside. The patient's O2 sats were in the low 80s on RA with a good wave form. He was placed on oxymask at 15lpm and had improvement in his sats to upper 80s. He had a very wet/junky sounding cough concerning for aspiration event. Attempted oral suctioning with out effect. RT was called for deep suctioning. A fair amount of thick secretions were suctioned however O2 sats remain low. The patient was placed on HFNC. O2 sats improved to mid 90s with high flow. CXR obtained and did not show any significant focal airspace disease, pleural fluid or pneumothorax.     Acute hypoxic respiratory failure 2/2 aspiration event  - Up grade admission to [facility]   - Continue HFNC for now. Pt is not a Candidate for BiPAP given vomiting. Per POLST he is DNI.   - Will broaden antibiotic to Vanc and Zosyn  - Again attempted to call his emergency contact, but was unable to reach anyone. Left message and requested a call back ASAP. Called his NH and they were unable to provide any information regarding additional contacts.   -  Given pt's underlying dementia and frailty, recurrent head bleed, hip fracture, UTI, and now aspiration event and respiratory failure it would be very  reasonable to transition the patient to comfort care, but without any surrogate decision makers available will continue with current level of medical support at this time.   - Palliative Care Consult placed to help with goals of care decisions.     Severe Sepsis likely 2/2 UTI vs Aspiration vs other   Pt did not meet criteria for sepsis on admission. UA however was concerning for infection, so he was started on Ceftriaxone. Overnight, as noted above pt became febrile and vomited with likely aspiration event. He is now tachycardic with rates in the 110-120 and hypotensive with systolic pressures around 70-80.   - Will check lactate STAT  - Continue fluid boluses   - If BP not improved with fluids, will need to initiate pressors     Right intertrochanteric hip fracture   Unwitnessed fall   Pt suffered an unwitnessed fall at his assisted living facility. Circumstances of the fall are unclear. He complained of right hip pain. XR showed Intertrochanteric fracture of the right hip. No dislocation. Postoperative change left hip arthroplasty. Pelvis negative for fracture.   - Orthopedics consultation   - Bed rest   - Pain control as needed  - Pt is not medically stable for surgical intervention at this time    Intraparenchymal hematoma, likely subacute, with surrounding vasogenic edema and mild localized mass effect.   Hx of prior head bleed (October 2022, September 2020)    Concern for cerebral amyloid angiopathy   Pt has a hx of both IPH as well as SAH in October of 2022. He was evaluated by neurology who thought this was likely related to amyloid angiopathy. His anticoagulation was held for a period of time, but then ultimately resumed. CT head obtained this admission showed new 1.3 cm parenchymal hematoma in the right occipitoparietal region with surrounding vasogenic edema. The appearance of this favors a subacute age. There is mild localized mass effect without midline shift or herniation. There has been interval  resolution of previously described hemorrhage in the lateral right temporal lobe as well as resolution of the previously demonstrated right occipital subarachnoid hemorrhage.   * Neurosurgery consulted in the Emergency Department   - Since bleed is subacute, neurosurgery does not recommend reversal of anticoagulation   - SBP < 150   - Q4H neuro checks   - Repeat Head CT in AM (head CT checked early due to fever, vomiting, and increased agitation and unchanged from prior).     Chronic atrial fibrillation on anticoagulation   - Hold PTA Xarelto in the setting of new brain bleed  - Would recommend discontinuing anti-coagulation altogether in this patient with dementia and frequent falls. This is now the patient's third documented head bleed within in the past few years.     Mild normocytic anemia   Hemoglobin is 12.6 on admission.   - Monitor for bleeding    Acute toxic metabolic encephalopathy   Alzheimer's Dementia   Parkinsonism  Frequent Falls   Depression/Anxiety  Per report, pt had worsening confusion today prior to his fall. It is unclear exactly what his baseline is. Currently he is completely disoriented. He attempts to answer my questions but his responses are entirely unintelligible.   PTA medications include sinemet, quetiapine and venlafaxine   - Resume mediations when verified   - Monitor for delirium     Diet: NPO  DVT Prophylaxis: Pneumatic Compression Devices  Solorio Catheter: Not present  Central Lines: None  Cardiac Monitoring: None  Code Status: DNR/DNI per POLST     **Myself and the Emergency Department provider attempted to call the patient's emergency contacts listed in the chart. We have been unable to reach them overnight. **      Disposition Plan   The patient's care was discussed with the Patient.    Clau Richard MD  Hospitalist Service  [facility]  Securely message with the Vocera Web Console (learn more here)  Text page via [employer] Paging/Directory          ______________________________________________________________________    Chief Complaint   Unwitnessed Fall     History is obtained from the patient    History of Present Illness   Maricarmen Lovell is a 88 year old male who presents to the ED after a fall. He resides in an assisted living facility and had an unwitnessed fall this evening. The patient cannot provide any history due to AMS and underlying dementia. He did complain of right hip pain and some head pain, but denies hitting his head to EMS. Additional history is limited. The patient attempts to answer my questions, but his responses are entirely unintelligible. He is only oriented to person.     Review of Systems    Unable to obtain complete review of systems due to patient's altered mental status     Past Medical History    I have reviewed this patient's medical history and updated it with pertinent information if needed.   Past Medical History:   Diagnosis Date     Alzheimer disease (H)      Chronic a-fib (H)      CKD (chronic kidney disease) stage 3, GFR 30-59 ml/min (H)      COPD (chronic obstructive pulmonary disease) (H)      Depression      Frequent falls      HTN (hypertension)        Past Surgical History   I have reviewed this patient's surgical history and updated it with pertinent information if needed.  Past Surgical History:   Procedure Laterality Date     OPEN REDUCTION INTERNAL FIXATION HIP BIPOLAR Left 1/12/2021    Procedure: LEFT HIP CEMENTED HEMIARTHROPLASTY;  Surgeon: Moody Gaines MD;  Location:  OR       Social History   I have reviewed this patient's social history and updated it with pertinent information if needed.  Social History     Tobacco Use     Smoking status: Former     Smokeless tobacco: Never   Substance Use Topics     Alcohol use: Never     Drug use: Never       Family History   Unable to obtain family history due to altered mental status     Prior to Admission Medications   Prior to Admission Medications    Prescriptions Last Dose Informant Patient Reported? Taking?   QUEtiapine (SEROQUEL) 25 MG tablet  Nursing Home Yes No   Sig: Take 12.5 mg by mouth every evening   STIMULANT LAXATIVE 8.6-50 MG tablet  Nursing Home No No   Sig: TAKE ONE TABLET BY MOUTH TWICE A DAY FOR CONSTIPATION   Patient taking differently: Take 1 tablet by mouth 2 times daily    acetaminophen (TYLENOL) 500 MG tablet   Yes No   Sig: Take 1,000 mg by mouth 3 times daily And 1000mg daily PRN, max 4gm/day   atorvastatin (LIPITOR) 20 MG tablet  Nursing Home No No   Sig: Take 1 tablet (20 mg) by mouth every evening   hypromellose (ARTIFICIAL TEARS) 0.5 % SOLN ophthalmic solution  Nursing Home Yes No   Sig: Place 2 drops into both eyes every 6 hours as needed for dry eyes    melatonin 3 MG tablet  Nursing Home Yes No   Sig: Take 3 mg by mouth At Bedtime    polyethylene glycol (MIRALAX) 17 g packet  correction Yes No   Sig: Take 1 packet by mouth daily    rivaroxaban ANTICOAGULANT (XARELTO) 15 MG TABS tablet  Nursing Home Yes No   Sig: Take 15 mg by mouth every morning   venlafaxine (EFFEXOR-ER) 225 MG 24 hr tablet  Nursing Home Yes No   Sig: Take 225 mg by mouth every evening    vitamin D3 (CHOLECALCIFEROL) 50 mcg (2000 units) tablet  Nursing Home Yes No   Sig: Take 50 mcg by mouth every morning       Facility-Administered Medications: None     Allergies   Allergies   Allergen Reactions     Pecan [Nuts] Anaphylaxis     Fluoxetine Nausea     Juglans        Physical Exam   Vital Signs: Temp: 97.6  F (36.4  C) Temp src: Temporal BP: (!) 142/92 Pulse: 70   Resp: 14 SpO2: 97 %      Weight: 0 lbs 0 oz    Constitutional: Awake, alert, cooperative, no apparent distress.  Eyes: Conjunctiva and pupils examined and normal.  HEENT: dry mucous membranes, dried blood in mouth and on teeth   Respiratory: non-labored breathing   Cardiovascular: irregularly irregular, controlled rate   GI: Soft, non-distended, non-tender  Skin: No rashes, no cyanosis, no  edema.  Musculoskeletal: R leg shortened and externally rotated   Neurologic: Cranial nerves 2-12 intact, follows commands, speech is difficult to understand, incoherent   Psychiatric: Alert, oriented to person only       Data   Data reviewed today: I reviewed all medications, new labs and imaging results over the last 24 hours. I personally reviewed the EKG tracing showing atrial fibrillation .    Recent Labs   Lab 12/29/22 2014   WBC 10.2   HGB 12.6*         INR 1.41*      POTASSIUM 5.2   CHLORIDE 106   CO2 30   BUN 25   CR 0.97   ANIONGAP 2*   BEATRIZ 8.5   GLC 95     Recent Results (from the past 24 hour(s))   CT Head w/o Contrast    Narrative    EXAM: CT HEAD W/O CONTRAST  LOCATION: [facility]  DATE/TIME: 12/29/2022 8:39 PM    INDICATION: Fall, trauma, pain.  COMPARISON: MRI 03/09/2021.  TECHNIQUE: Routine CT Head without IV contrast. Multiplanar reformats. Dose reduction techniques were used.    FINDINGS:  INTRACRANIAL CONTENTS: The previously demonstrated hemorrhage in the lateral right temporal lobe is no longer visualized. There is mild hypoattenuation at its former site, likely representing edema and/or gliosis. Right occipital sulcal subarachnoid   hemorrhage is also resolved. There is a new 1.3 cm focus of parenchymal hemorrhage in the right occipitoparietal region (image 19) with surrounding edema. The ill-defined appearance of this hemorrhage favors a subacute age. There is moderate scattered   white matter hypoattenuation, which is nonspecific. This commonly reflects chronic small vessel ischemic change. There is a small chronic left parietal cortical infarction. Diffuse and proportionate prominence of the ventricles, sulci and cisterns is   compatible with moderate generalized parenchymal atrophy. The sella is unremarkable for technique. The cerebellar tonsils are appropriately positioned.     VISUALIZED ORBITS/SINUSES/MASTOIDS: Prior bilateral cataract  surgery. Visualized portions of the orbits are otherwise unremarkable. No paranasal sinus mucosal disease. No middle ear or mastoid effusion.    BONES/SOFT TISSUES: No scalp hematoma. No skull fracture.      Impression    IMPRESSION:  1.  New 1.3 cm parenchymal hematoma in the right occipitoparietal region with surrounding vasogenic edema. The appearance of this favors a subacute age. There is mild localized mass effect without midline shift or herniation.  2.  Interval resolution of previously described hemorrhage in the lateral right temporal lobe.  3.  Resolution of previously demonstrated right occipital subarachnoid hemorrhage.  4.  No new intracranial hemorrhage demonstrated.  5.  No evidence of acute calvarial fracture.  6.  Age-related and chronic ischemic changes as above.    Results discussed with Dr. Mccurdy on 12/29/2022 9:00 PM.   CT Cervical Spine w/o Contrast    Narrative    EXAM: CT CERVICAL SPINE W/O CONTRAST  LOCATION: [facility]  DATE/TIME: 12/29/2022 8:40 PM    INDICATION: Fall, trauma, pain.  COMPARISON: 11/17/2021.  TECHNIQUE: Routine CT Cervical Spine without IV contrast. Multiplanar reformats. Dose reduction techniques were used.    FINDINGS:  ALIGNMENT: Slight retrolisthesis at C5-C6, similar to prior. Symmetric facet alignment.    BONES: There is no evidence of acute displaced fractures. The occipital condyles appear intact. Vertebral body heights are unremarkable. No destructive bony lesions are apparent.    DISCS: Moderate C5-C6 and C6-C7 spondylosis with intervertebral disc height loss and disc-osteophyte complex formation.    SPINAL CANAL: No high-grade stenosis.    NEURAL FORAMINA: Moderate to severe left C5-C6 and moderate left C6-C7 neural foraminal stenosis.    SOFT TISSUES: No prevertebral soft tissue thickening. Unremarkable paraspinal soft tissues.    UPPER THORAX: The visualized lung apices are well aerated.      Impression    IMPRESSION:  1.  No  evidence of acute displaced fracture.  2.  No evidence of traumatic subluxation.  3.  Degenerative change.   XR Pelvis w Hip Right 1 View    Narrative    EXAM: XR PELVIS AND HIP RIGHT 1 VIEW  LOCATION: [facility]  DATE/TIME: 12/29/2022 8:48 PM    INDICATION: Trauma, pain.  COMPARISON: None.      Impression    IMPRESSION: Intertrochanteric fracture of the right hip. No dislocation. Postoperative change left hip arthroplasty. Pelvis negative for fracture.

## 2022-12-30 NOTE — ED NOTES
Physician came,re evaluated the patient,spoke to sister,for comfort care,high flow of O 2 changed to NC,restraints removed.

## 2022-12-30 NOTE — ED TRIAGE NOTES
Pt presents to the ED via EMS from assisted living after a fall.  Per EMS, pt complains of right hip pain, is at his baseline mentation per staff.  Pt is on eliquis denies hitting his head.     Triage Assessment     Row Name 12/29/22 2004       Triage Assessment (Adult)    Airway WDL WDL       Skin Circulation/Temperature WDL    Skin Circulation/Temperature WDL WDL       Cardiac WDL    Cardiac WDL WDL       Peripheral/Neurovascular WDL    Peripheral Neurovascular WDL WDL       Cognitive/Neuro/Behavioral WDL    Cognitive/Neuro/Behavioral WDL X  Pt has history of alzheimers, not consistantly answering questions, per EMS is at his basline.

## 2022-12-30 NOTE — PROGRESS NOTES
Neurosurgery progress note    Patient was admitted to the ER yesterday after a fall and hip pain.  Overnight he reportedly decompensated, he was coughing and apparently became unresponsive according to RN reports.  Head CT scan was performed at 3:55 AM, which was after the change in symptoms and was stable.    This morning he is seen lying in bed, on high flow nasal cannula, with no movement in his extremities, other than occasional twitching movements in his right arm., he does not respond to voice or pain in all 4 extremities, does not respond to sternal rub.  His pupils are fixed and constricted bilaterally not reactive to light.    GCS 3    On reviewing the medical record, patient on admission was alert and oriented and following commands.    Imaging    Head CT scan 3:35 AM demonstrates stable appearing right occipital temporal region acute to subacute hematoma measuring 1.4 cm with surrounding edema.    Assessment    Significant change in mental status since admission, now not responding to pain  Right occipital temporal intraparenchymal hemorrhage, stable  Anticoagulated on Xarelto, stopped but not reversed.    Twitching movements in the right upper extremity    Plan    Profound change in mental status not explained by findings on his imaging.  He may be having seizures, as he has some shaking movements in his right arm, will treat with 1000 mg Keppra IV now, followed by 500 mg twice daily.  Will consult neurology for further evaluation.    Discussed with Dr. Javier    Addendum 10:00 AM    Discussed with neurology who recommends a stat 1 hour video EEG to rule out seizure.  Orders placed.  Paged hospitalist to discuss patient, as well.  Updated ER RN with plan

## 2022-12-31 NOTE — ED NOTES
Spoke with Jessica kapoor Hospitalist about PT. Sister will have to wait for update until tomorrow. Will call Sister to confirm.

## 2022-12-31 NOTE — PROGRESS NOTES
[facility]    Consult Note - [facility]    ______________________________________________________________________    [facility] 24/7 Contact Number: [phone]    - Providers: Please contact [facility] with changes in orders or clinical plan of care   - Nursing: Please contact [facility] with significant changes in patient condition    Hospice will notify the care team (including the hospitalist) to confirm date of inpatient hospice ([facility]IP) admission.    New Epic encounter will not be created until hospice completes admission.   ______________________________________________________________________        Hospice Diagnosis: Hospice Dx: Intraparenchymal hemorrhage  Indication for Inpatient Hospice:   Pain management    Goals for Hospital Discharge:     LTC confirmed ability for patient to return  on hospice services next week if condition  stabilizes.     Plan of Care Discussed with the Following:   - Nurse: Ramirez and Charge RN  - Hospitalist/Rounding Provider: Dr. Mc Hernadez's Family/Preferred Contact: Sister Cassi   Hospice Provider: Adam Pond     Summary of Visit (includes assessment, medications and any new orders):     Hospice consents signed and returned on 12/31.  Patient is being admitted to [facility] hospice services.  Recommend premedicating with PRN dilaudid prior to repositioning / cares for optimal comfort.  Appreciate nursing care and collaboration.     Andree Garcia RN

## 2022-12-31 NOTE — H&P
[facility]    History and Physical  Hospitalist       Date of Admission:  12/29/2022  Date of Service (when I saw the patient): 12/31/22    Assessment & Plan   Maricarmen Lovell is a 88 year old male who presents with hospice.     Maricarmen Lovell is a 88 year old male with past medical history significant for Alzheimer's Disease, Chronic atrial fibrillation on anticoagulation, CKD stage III, HTN, COPD, depression, and frequent falls admitted on 12/29/2022 after a fall. He is found to have a new intertrochanteric fracture of the right hip. as well as a probable subacute 1.3cm parenchymal hematoma in the right occipitoparietal region.      Acute hypoxic respiratory failure 2/2 aspiration event  - Comfort cares.      Severe Sepsis likely 2/2 UTI vs Aspiration vs other   - Comfort cares.      Right intertrochanteric hip fracture   Unwitnessed fall   Pt suffered an unwitnessed fall at his assisted living facility. Circumstances of the fall are unclear. He complained of right hip pain. XR showed Intertrochanteric fracture of the right hip. No dislocation. Postoperative change left hip arthroplasty. Pelvis negative for fracture.   - Comfort cares.      Intraparenchymal hematoma, likely subacute, with surrounding vasogenic edema and mild localized mass effect.   Hx of prior head bleed (October 2022, September 2020)    Concern for cerebral amyloid angiopathy   Pt has a hx of both IPH as well as SAH in October of 2022. He was evaluated by neurology who thought this was likely related to amyloid angiopathy. His anticoagulation was held for a period of time, but then ultimately resumed. CT head obtained this admission showed new 1.3 cm parenchymal hematoma in the right occipitoparietal region with surrounding vasogenic edema. The appearance of this favors a subacute age. There is mild localized mass effect without midline shift or herniation. There has been interval resolution of previously described  hemorrhage in the lateral right temporal lobe as well as resolution of the previously demonstrated right occipital subarachnoid hemorrhage.   - Comfort cares.      Chronic atrial fibrillation on anticoagulation   - Comfort cares.      Mild normocytic anemia   - Comfort cares.      Acute toxic metabolic encephalopathy   Alzheimer's Dementia   Parkinsonism  Frequent Falls   Depression/Anxiety  - Comfort cares.      Disposition: Comfort cares.          Clinically Significant Risk Factors                                  Dr. Bigg Bentley DO, MARIAMA, A  [facility]  Pager [phone]    Bigg Bentley DO  [facility]  Securely message with the Vocera Web Console (learn more here)  Text page via [facility] Paging/Directory      Primary Care Physician   [facility]    Chief Complaint   Hospice.     History is obtained from the patient and medical records.     History of Present Illness   Maricarmen Lvoell is a 88 year old male who presents with hospice.     Past Medical History    I have reviewed this patient's medical history and updated it with pertinent information if needed.   Past Medical History:   Diagnosis Date     Alzheimer disease (H)      Chronic a-fib (H)      CKD (chronic kidney disease) stage 3, GFR 30-59 ml/min (H)      COPD (chronic obstructive pulmonary disease) (H)      Depression      Frequent falls      HTN (hypertension)        Past Surgical History   I have reviewed this patient's surgical history and updated it with pertinent information if needed.  Past Surgical History:   Procedure Laterality Date     OPEN REDUCTION INTERNAL FIXATION HIP BIPOLAR Left 1/12/2021    Procedure: LEFT HIP CEMENTED HEMIARTHROPLASTY;  Surgeon: Moody Gaines MD;  Location:  OR       Prior to Admission Medications   Prior to Admission Medications   Prescriptions Last Dose Informant Patient Reported? Taking?   QUEtiapine (SEROQUEL) 25 MG tablet as needed  Nursing Home Yes Yes   Sig: Take 12.5 mg by mouth 2 times daily as needed (dementia with behavioral disturbance)   White Petrolatum-Mineral Oil (REFRESH P.M.) OINT 12/28/2022 Nursing Home Yes Yes   Sig: Place into both eyes At Bedtime Place a small pea size amount into lower conjunctival sac   acetaminophen (TYLENOL) 500 MG tablet 12/29/2022 at 1400 Nursing Home Yes Yes   Sig: Take 1,000 mg by mouth 3 times daily And 1000mg daily PRN, max 4gm/day   atorvastatin (LIPITOR) 20 MG tablet 12/28/2022 Nursing Home No Yes   Sig: Take 1 tablet (20 mg) by mouth every evening   carbidopa-levodopa (SINEMET)  MG tablet 12/29/2022 at 1400 Nursing Home Yes Yes   Sig: Take 1 tablet by mouth 3 times daily 0800, 1400, 2000   carboxymethylcellulose PF (REFRESH LIQUIGEL) 1 % ophthalmic gel 12/29/2022 at 1600 Nursing Home Yes Yes   Sig: Place 1 drop into both eyes 4 times daily   cycloSPORINE (RESTASIS) 0.05 % ophthalmic emulsion 12/29/2022 at am Nursing Home Yes Yes   Sig: Place 1 drop into both eyes 2 times daily Sever dry eyes   loperamide (IMODIUM) 2 MG capsule as needed Nursing Home Yes Yes   Sig: Take 2 mg by mouth every 4 hours as needed (loose stools)   melatonin 3 MG tablet 12/28/2022 Nursing Home Yes Yes   Sig: Take 3 mg by mouth At Bedtime    psyllium (METAMUCIL/KONSYL) capsule 12/29/2022 at am Nursing Home Yes Yes   Sig: Take 1 capsule by mouth 2 times daily   rivaroxaban ANTICOAGULANT (XARELTO) 15 MG TABS tablet 12/28/2022 Nursing Home Yes Yes   Sig: Take 15 mg by mouth every morning   sennosides (SENOKOT) 8.6 MG tablet as needed Nursing Home Yes Yes   Sig: Take 1 tablet by mouth every 8 hours as needed for constipation   venlafaxine (EFFEXOR) 75 MG tablet 12/29/2022 at 1400 Nursing Home Yes Yes   Sig: Take 75 mg by mouth 3 times daily   vitamin D3 (CHOLECALCIFEROL) 50 mcg (2000 units) tablet 12/29/2022 at am Nursing Home Yes Yes   Sig: Take 50 mcg by mouth every morning       Facility-Administered Medications: None      Allergies   Allergies   Allergen Reactions     Pecan [Nuts] Anaphylaxis     Fluoxetine Nausea     Juglans        Social History   I have reviewed this patient's social history and updated it with pertinent information if needed. Maricarmen Lovell  reports that he has quit smoking. He has never used smokeless tobacco. He reports that he does not drink alcohol and does not use drugs.    Family History   I have reviewed this patient's family history and updated it with pertinent information if needed.       Review of Systems   The 10 point Review of Systems is negative other than noted in the HPI or here.     Physical Exam           BP: 121/57 Pulse: 80   Resp: 18 SpO2: 94 % O2 Device: Nasal cannula Oxygen Delivery: 2.5 LPM  Vital Signs with Ranges  Pulse:  [64-95] 80  Resp:  [18] 18  BP: ()/() 121/57  SpO2:  [94 %-95 %] 94 %  154 lbs 1.62 oz      GENERAL:NAD.         Data     Data reviewed today:  I personally reviewed both laboratory and imaging data.   Recent Labs   Lab 12/29/22 2014   WBC 10.2   HGB 12.6*         INR 1.41*      POTASSIUM 5.2   CHLORIDE 106   CO2 30   BUN 25   CR 0.97   ANIONGAP 2*   BEATRIZ 8.5   GLC 95       No results found for this or any previous visit (from the past 24 hour(s)).

## 2022-12-31 NOTE — PROVIDER NOTIFICATION
MD Notification    Notified Person: MD    Notified Person Name:  Mc    Notification Date/Time:11:18 AM      Notification Interaction:vocera    Purpose of Notification:  This pt has a likely pressure injury to the sacral area.  Can you order a WOCN consult?  Orders Received:    Comments:

## 2022-12-31 NOTE — PLAN OF CARE
Admitted from ED overnight. Comfort cares maintained. Pt here with fall, R femur fracture, IPH. Contact precautions for ESBL in urine. RUTH ANN orientation. Pt does not follow commands. On 2.5L NC. Pain with movement, PRN IV dilaudid given. Up with Ax2 lift, t/r PRN for comfort. Plan to discharge on hospice this week.

## 2022-12-31 NOTE — CONSULTS
Electronic consents received from patient sister this AM. Hospice CNL to f/u on GIP admission.     Madhavi Kumari RN  [facility]  [phone]

## 2022-12-31 NOTE — PROGRESS NOTES
RECEIVING UNIT ED HANDOFF REVIEW    ED Nurse Handoff Report was reviewed by: Amanda Caban RN on December 31, 2022 at 12:14 AM

## 2022-12-31 NOTE — DISCHARGE SUMMARY
Physician Discharge Summary        Primary Care Physician:  Tyree Lyons Physician Admission Date: 12/29/2022   Discharge Provider: Bigg Bentley DO Discharge Date: 12/31/2022   Disposition: gip Code Status: No CPR- Do NOT Intubate   Activity: gip Diet: None        Condition at Discharge: hospice      Discharge Diagnoses/Hospital Summary:      Maricarmen Lovell is a 88 year old male with past medical history significant for Alzheimer's Disease, Chronic atrial fibrillation on anticoagulation, CKD stage III, HTN, COPD, depression, and frequent falls admitted on 12/29/2022 after a fall. He is found to have a new intertrochanteric fracture of the right hip. as well as a probable subacute 1.3cm parenchymal hematoma in the right occipitoparietal region.      Acute hypoxic respiratory failure 2/2 aspiration event  - Comfort cares.      Severe Sepsis likely 2/2 UTI vs Aspiration vs other   - Comfort cares.      Right intertrochanteric hip fracture   Unwitnessed fall   Pt suffered an unwitnessed fall at his assisted living facility. Circumstances of the fall are unclear. He complained of right hip pain. XR showed Intertrochanteric fracture of the right hip. No dislocation. Postoperative change left hip arthroplasty. Pelvis negative for fracture.   - Comfort cares.      Intraparenchymal hematoma, likely subacute, with surrounding vasogenic edema and mild localized mass effect.   Hx of prior head bleed (October 2022, September 2020)    Concern for cerebral amyloid angiopathy   Traumatic Intraparenchymal hematoma due to fall    Pt has a hx of both IPH as well as SAH in October of 2022. He was evaluated by neurology who thought this was likely related to amyloid angiopathy. His anticoagulation was held for a period of time, but then ultimately resumed. CT head obtained this admission showed new 1.3 cm parenchymal hematoma in the right occipitoparietal region with surrounding vasogenic edema. The appearance of this  favors a subacute age. There is mild localized mass effect without midline shift or herniation. There has been interval resolution of previously described hemorrhage in the lateral right temporal lobe as well as resolution of the previously demonstrated right occipital subarachnoid hemorrhage.   - Comfort cares.      Chronic atrial fibrillation on anticoagulation   - Comfort cares.      Mild normocytic anemia   - Comfort cares.      Acute toxic metabolic encephalopathy   Alzheimer's Dementia   Parkinsonism  Frequent Falls   Depression/Anxiety  - Comfort cares.      Disposition: Comfort cares.          Discharge Medications:      Review of your medicines      UNREVIEWED medicines. Ask your doctor about these medicines      Dose / Directions   acetaminophen 500 MG tablet  Commonly known as: TYLENOL      Dose: 1,000 mg  Take 1,000 mg by mouth 3 times daily And 1000mg daily PRN, max 4gm/day  Refills: 0     atorvastatin 20 MG tablet  Commonly known as: LIPITOR  Used for: Dyslipidemia      Dose: 20 mg  Take 1 tablet (20 mg) by mouth every evening  Refills: 0     carbidopa-levodopa  MG tablet  Commonly known as: SINEMET      Dose: 1 tablet  Take 1 tablet by mouth 3 times daily 0800, 1400, 2000  Refills: 0     carboxymethylcellulose PF 1 % ophthalmic gel  Commonly known as: REFRESH LIQUIGEL      Dose: 1 drop  Place 1 drop into both eyes 4 times daily  Refills: 0     cycloSPORINE 0.05 % ophthalmic emulsion  Commonly known as: RESTASIS      Dose: 1 drop  Place 1 drop into both eyes 2 times daily Sever dry eyes  Refills: 0     loperamide 2 MG capsule  Commonly known as: IMODIUM      Dose: 2 mg  Take 2 mg by mouth every 4 hours as needed (loose stools)  Refills: 0     psyllium capsule  Commonly known as: METAMUCIL/KONSYL      Dose: 1 capsule  Take 1 capsule by mouth 2 times daily  Refills: 0     QUEtiapine 25 MG tablet  Commonly known as: SEROquel      Dose: 12.5 mg  Take 12.5 mg by mouth 2 times daily as needed (dementia  with behavioral disturbance)  Refills: 0     Refresh P.M. Oint      Place into both eyes At Bedtime Place a small pea size amount into lower conjunctival sac  Refills: 0     rivaroxaban ANTICOAGULANT 15 MG Tabs tablet  Commonly known as: XARELTO      Dose: 15 mg  Take 15 mg by mouth every morning  Refills: 0     sennosides 8.6 MG tablet  Commonly known as: SENOKOT      Dose: 1 tablet  Take 1 tablet by mouth every 8 hours as needed for constipation  Refills: 0     venlafaxine 75 MG tablet  Commonly known as: EFFEXOR  Ask about: Which instructions should I use?      Dose: 75 mg  Take 75 mg by mouth 3 times daily  Refills: 0     vitamin D3 50 mcg (2000 units) tablet  Commonly known as: CHOLECALCIFEROL      Dose: 50 mcg  Take 50 mcg by mouth every morning  Refills: 0        CONTINUE these medicines which have NOT CHANGED      Dose / Directions   melatonin 3 MG tablet      Dose: 3 mg  Take 3 mg by mouth At Bedtime  Refills: 0                  Discharge Instructions:  Hospice.        Vital Signs in last 24 hours:    Pulse:  [64-95] 80  Resp:  [18] 18  BP: ()/() 121/57  SpO2:  [94 %-95 %] 94 %    GENERAL: NAD.           Total Time on discharge process is: 32 minutes    Dr. Bigg Bentley DO, MBA, A  Internal Medicine Hospitalist  12/31/2022     Azithromycin Pregnancy And Lactation Text: This medication is considered safe during pregnancy and is also secreted in breast milk.

## 2023-01-01 VITALS — RESPIRATION RATE: 30 BRPM | OXYGEN SATURATION: 92 %

## 2023-01-01 LAB
BACTERIA BLD CULT: NO GROWTH
BACTERIA BLD CULT: NO GROWTH
BACTERIA UR CULT: ABNORMAL

## 2023-01-01 PROCEDURE — 250N000013 HC RX MED GY IP 250 OP 250 PS 637: Performed by: INTERNAL MEDICINE

## 2023-01-01 PROCEDURE — 120N000001 HC R&B MED SURG/OB

## 2023-01-01 PROCEDURE — 110N000005 HC R&B HOSPICE, ACCENT

## 2023-01-01 PROCEDURE — 250N000013 HC RX MED GY IP 250 OP 250 PS 637: Performed by: HOSPITALIST

## 2023-01-01 PROCEDURE — 250N000011 HC RX IP 250 OP 636: Performed by: HOSPITALIST

## 2023-01-01 PROCEDURE — 99231 SBSQ HOSP IP/OBS SF/LOW 25: CPT | Mod: GV | Performed by: INTERNAL MEDICINE

## 2023-01-01 PROCEDURE — 99207 PR APP CREDIT; MD BILLING SHARED VISIT: CPT | Mod: GV | Performed by: INTERNAL MEDICINE

## 2023-01-01 PROCEDURE — 99238 HOSP IP/OBS DSCHRG MGMT 30/<: CPT | Mod: GV | Performed by: NURSE PRACTITIONER

## 2023-01-01 RX ORDER — MORPHINE SULFATE 20 MG/ML
5 SOLUTION ORAL EVERY 4 HOURS
Status: DISCONTINUED | OUTPATIENT
Start: 2023-01-01 | End: 2023-01-01 | Stop reason: HOSPADM

## 2023-01-01 RX ORDER — LORAZEPAM 2 MG/ML
1 CONCENTRATE ORAL EVERY 4 HOURS PRN
Status: DISCONTINUED | OUTPATIENT
Start: 2023-01-01 | End: 2023-01-01 | Stop reason: HOSPADM

## 2023-01-01 RX ORDER — MORPHINE SULFATE 20 MG/ML
5 SOLUTION ORAL
Status: DISCONTINUED | OUTPATIENT
Start: 2023-01-01 | End: 2023-01-01 | Stop reason: HOSPADM

## 2023-01-01 RX ADMIN — Medication 5 MG: at 21:52

## 2023-01-01 RX ADMIN — HYDROMORPHONE HYDROCHLORIDE 0.5 MG: 1 INJECTION, SOLUTION INTRAMUSCULAR; INTRAVENOUS; SUBCUTANEOUS at 23:26

## 2023-01-01 RX ADMIN — LORAZEPAM 1 MG: 2 INJECTION INTRAMUSCULAR; INTRAVENOUS at 23:37

## 2023-01-01 RX ADMIN — HYDROMORPHONE HYDROCHLORIDE 0.5 MG: 1 INJECTION, SOLUTION INTRAMUSCULAR; INTRAVENOUS; SUBCUTANEOUS at 13:22

## 2023-01-01 RX ADMIN — HYDROMORPHONE HYDROCHLORIDE 0.5 MG: 1 INJECTION, SOLUTION INTRAMUSCULAR; INTRAVENOUS; SUBCUTANEOUS at 12:52

## 2023-01-01 RX ADMIN — HYDROMORPHONE HYDROCHLORIDE 0.3 MG: 1 INJECTION, SOLUTION INTRAMUSCULAR; INTRAVENOUS; SUBCUTANEOUS at 12:34

## 2023-01-01 RX ADMIN — HYDROMORPHONE HYDROCHLORIDE 0.3 MG: 1 INJECTION, SOLUTION INTRAMUSCULAR; INTRAVENOUS; SUBCUTANEOUS at 02:56

## 2023-01-01 RX ADMIN — HYDROMORPHONE HYDROCHLORIDE 2 MG: 1 SOLUTION ORAL at 08:15

## 2023-01-01 RX ADMIN — HYDROMORPHONE HYDROCHLORIDE 0.5 MG: 1 INJECTION, SOLUTION INTRAMUSCULAR; INTRAVENOUS; SUBCUTANEOUS at 02:58

## 2023-01-01 RX ADMIN — HYDROMORPHONE HYDROCHLORIDE 0.5 MG: 1 INJECTION, SOLUTION INTRAMUSCULAR; INTRAVENOUS; SUBCUTANEOUS at 15:55

## 2023-01-01 RX ADMIN — Medication 5 MG: at 05:02

## 2023-01-01 RX ADMIN — HYDROMORPHONE HYDROCHLORIDE 0.5 MG: 1 INJECTION, SOLUTION INTRAMUSCULAR; INTRAVENOUS; SUBCUTANEOUS at 21:47

## 2023-01-01 RX ADMIN — HYDROMORPHONE HYDROCHLORIDE 0.5 MG: 1 INJECTION, SOLUTION INTRAMUSCULAR; INTRAVENOUS; SUBCUTANEOUS at 02:16

## 2023-01-01 RX ADMIN — HYDROMORPHONE HYDROCHLORIDE 0.5 MG: 1 INJECTION, SOLUTION INTRAMUSCULAR; INTRAVENOUS; SUBCUTANEOUS at 14:29

## 2023-01-01 RX ADMIN — LORAZEPAM 1 MG: 2 INJECTION INTRAMUSCULAR; INTRAVENOUS at 04:30

## 2023-01-01 RX ADMIN — Medication 5 MG: at 00:14

## 2023-01-01 RX ADMIN — Medication 5 MG: at 09:09

## 2023-01-01 RX ADMIN — Medication 5 MG: at 12:45

## 2023-01-01 RX ADMIN — HYDROMORPHONE HYDROCHLORIDE 0.3 MG: 1 INJECTION, SOLUTION INTRAMUSCULAR; INTRAVENOUS; SUBCUTANEOUS at 09:47

## 2023-01-01 RX ADMIN — Medication 5 MG: at 13:44

## 2023-01-01 RX ADMIN — Medication 5 MG: at 17:41

## 2023-01-01 RX ADMIN — HYDROMORPHONE HYDROCHLORIDE 0.5 MG: 1 INJECTION, SOLUTION INTRAMUSCULAR; INTRAVENOUS; SUBCUTANEOUS at 07:08

## 2023-01-01 RX ADMIN — Medication 5 MG: at 21:49

## 2023-01-01 RX ADMIN — LORAZEPAM 1 MG: 2 INJECTION INTRAMUSCULAR; INTRAVENOUS at 19:48

## 2023-01-01 RX ADMIN — HYDROMORPHONE HYDROCHLORIDE 0.5 MG: 1 INJECTION, SOLUTION INTRAMUSCULAR; INTRAVENOUS; SUBCUTANEOUS at 08:19

## 2023-01-01 RX ADMIN — Medication 5 MG: at 17:26

## 2023-01-01 RX ADMIN — HYDROMORPHONE HYDROCHLORIDE 0.5 MG: 1 INJECTION, SOLUTION INTRAMUSCULAR; INTRAVENOUS; SUBCUTANEOUS at 05:09

## 2023-01-01 RX ADMIN — HYDROMORPHONE HYDROCHLORIDE 0.5 MG: 1 INJECTION, SOLUTION INTRAMUSCULAR; INTRAVENOUS; SUBCUTANEOUS at 10:23

## 2023-01-01 RX ADMIN — HYDROMORPHONE HYDROCHLORIDE 0.5 MG: 1 INJECTION, SOLUTION INTRAMUSCULAR; INTRAVENOUS; SUBCUTANEOUS at 19:48

## 2023-01-01 RX ADMIN — MORPHINE SULFATE 5 MG: 100 SOLUTION ORAL at 16:24

## 2023-01-01 RX ADMIN — Medication 5 MG: at 01:56

## 2023-01-01 RX ADMIN — HYDROMORPHONE HYDROCHLORIDE 0.5 MG: 1 INJECTION, SOLUTION INTRAMUSCULAR; INTRAVENOUS; SUBCUTANEOUS at 18:06

## 2023-01-01 RX ADMIN — LORAZEPAM 1 MG: 1 TABLET ORAL at 11:10

## 2023-01-01 RX ADMIN — Medication 2 DROP: at 21:47

## 2023-01-01 RX ADMIN — HYDROMORPHONE HYDROCHLORIDE 0.3 MG: 1 INJECTION, SOLUTION INTRAMUSCULAR; INTRAVENOUS; SUBCUTANEOUS at 00:29

## 2023-01-01 RX ADMIN — Medication 2 DROP: at 08:15

## 2023-01-01 RX ADMIN — Medication 5 MG: at 06:08

## 2023-01-01 RX ADMIN — HYDROMORPHONE HYDROCHLORIDE 0.5 MG: 1 INJECTION, SOLUTION INTRAMUSCULAR; INTRAVENOUS; SUBCUTANEOUS at 14:37

## 2023-01-01 RX ADMIN — Medication 5 MG: at 09:49

## 2023-01-01 RX ADMIN — MORPHINE SULFATE 5 MG: 100 SOLUTION ORAL at 13:57

## 2023-01-01 ASSESSMENT — ACTIVITIES OF DAILY LIVING (ADL)
ADLS_ACUITY_SCORE: 43
ADLS_ACUITY_SCORE: 51
ADLS_ACUITY_SCORE: 43
ADLS_ACUITY_SCORE: 43
ADLS_ACUITY_SCORE: 51
ADLS_ACUITY_SCORE: 35
ADLS_ACUITY_SCORE: 43
ADLS_ACUITY_SCORE: 43
ADLS_ACUITY_SCORE: 47
ADLS_ACUITY_SCORE: 35
ADLS_ACUITY_SCORE: 51
ADLS_ACUITY_SCORE: 35
ADLS_ACUITY_SCORE: 51
ADLS_ACUITY_SCORE: 35
ADLS_ACUITY_SCORE: 35
ADLS_ACUITY_SCORE: 43
ADLS_ACUITY_SCORE: 35
ADLS_ACUITY_SCORE: 47
ADLS_ACUITY_SCORE: 35
ADLS_ACUITY_SCORE: 35
ADLS_ACUITY_SCORE: 43
ADLS_ACUITY_SCORE: 35
ADLS_ACUITY_SCORE: 35
ADLS_ACUITY_SCORE: 43
ADLS_ACUITY_SCORE: 35
ADLS_ACUITY_SCORE: 43
ADLS_ACUITY_SCORE: 35
ADLS_ACUITY_SCORE: 43
ADLS_ACUITY_SCORE: 35
ADLS_ACUITY_SCORE: 51
ADLS_ACUITY_SCORE: 43
ADLS_ACUITY_SCORE: 35
ADLS_ACUITY_SCORE: 43
ADLS_ACUITY_SCORE: 35
ADLS_ACUITY_SCORE: 43
ADLS_ACUITY_SCORE: 47
ADLS_ACUITY_SCORE: 43
ADLS_ACUITY_SCORE: 35
ADLS_ACUITY_SCORE: 51
ADLS_ACUITY_SCORE: 35
ADLS_ACUITY_SCORE: 43
ADLS_ACUITY_SCORE: 47
ADLS_ACUITY_SCORE: 35
ADLS_ACUITY_SCORE: 47
ADLS_ACUITY_SCORE: 35
ADLS_ACUITY_SCORE: 35
ADLS_ACUITY_SCORE: 43

## 2023-01-01 NOTE — PROGRESS NOTES
[facility]  Hospitalist Progress Note  [facility]        Date of Service (when I saw the patient): 01/01/2023        Interval History:      Patient resting comfortably.          Assessment and Plan:        Maricarmen Lovell is a 88 year old male who presents with hospice.      Maricarmen Lovell is a 88 year old male with past medical history significant for Alzheimer's Disease, Chronic atrial fibrillation on anticoagulation, CKD stage III, HTN, COPD, depression, and frequent falls admitted on 12/29/2022 after a fall. He is found to have a new intertrochanteric fracture of the right hip. as well as a probable subacute 1.3cm parenchymal hematoma in the right occipitoparietal region.      Acute hypoxic respiratory failure 2/2 aspiration event  - Comfort cares.      Severe Sepsis likely 2/2 UTI vs Aspiration vs other   - Comfort cares.      Right intertrochanteric hip fracture   Unwitnessed fall   Pt suffered an unwitnessed fall at his assisted living facility. Circumstances of the fall are unclear. He complained of right hip pain. XR showed Intertrochanteric fracture of the right hip. No dislocation. Postoperative change left hip arthroplasty. Pelvis negative for fracture.   - Comfort cares.      Intraparenchymal hematoma, likely subacute, with surrounding vasogenic edema and mild localized mass effect.   Hx of prior head bleed (October 2022, September 2020)    Concern for cerebral amyloid angiopathy   Pt has a hx of both IPH as well as SAH in October of 2022. He was evaluated by neurology who thought this was likely related to amyloid angiopathy. His anticoagulation was held for a period of time, but then ultimately resumed. CT head obtained this admission showed new 1.3 cm parenchymal hematoma in the right occipitoparietal region with surrounding vasogenic edema. The appearance of this favors a subacute age. There is mild localized mass effect without midline shift or  herniation. There has been interval resolution of previously described hemorrhage in the lateral right temporal lobe as well as resolution of the previously demonstrated right occipital subarachnoid hemorrhage.   - Comfort cares.      Chronic atrial fibrillation on anticoagulation   - Comfort cares.      Mild normocytic anemia   - Comfort cares.      Acute toxic metabolic encephalopathy   Alzheimer's Dementia   Parkinsonism  Frequent Falls   Depression/Anxiety  - Comfort cares.      Disposition: Comfort cares.         Solorio Catheter: Not present      Diet: None      Code: No CPR- Do NOT Intubate    Length of Stay:  LOS: 1 day /LOS: 1    Dr. Bigg Bentley DO, MBA, MHA  [facility] Hospitalist  Pager [phone]    Bigg Bentley DO  [facility]  Securely message with the Vocera Web Console (learn more here)  Text page via [employer] Paging/Directory      Clinically Significant Risk Factors Present on Admission               # Drug Induced Coagulation Defect: home medication list includes an anticoagulant medication      # Dementia: noted on problem list                               Physical Exam:           Resp. rate 20.    Vital Sign Ranges  Temperature No data recorded   Blood pressure No data recorded.       No data recorded.      Pulse No data recorded   Respirations Resp  Av  Min: 20  Max: 20   Pulse oximetry No data recorded     Vital Signs with Ranges  Resp:  [20] 20  Resp:  [20] 20    I/O Last 3 Shifts:   No intake/output data recorded.    I/O past 24 hours:   No intake or output data in the 24 hours ending 23 0815    Oxygen            Resp: 20        There were no vitals filed for this visit.    Lines  Peripheral IV 22 Anterior;Left Lower forearm (Active)   Number of days: 1               GENERAL:NAD.          Prior to Admission Medications:        Medications Prior to Admission   Medication Sig Dispense Refill Last Dose     acetaminophen (TYLENOL) 500  MG tablet Take 1,000 mg by mouth 3 times daily And 1000mg daily PRN, max 4gm/day        atorvastatin (LIPITOR) 20 MG tablet Take 1 tablet (20 mg) by mouth every evening        carbidopa-levodopa (SINEMET)  MG tablet Take 1 tablet by mouth 3 times daily 0800, 1400, 2000        carboxymethylcellulose PF (REFRESH LIQUIGEL) 1 % ophthalmic gel Place 1 drop into both eyes 4 times daily        cycloSPORINE (RESTASIS) 0.05 % ophthalmic emulsion Place 1 drop into both eyes 2 times daily Sever dry eyes        loperamide (IMODIUM) 2 MG capsule Take 2 mg by mouth every 4 hours as needed (loose stools)        melatonin 3 MG tablet Take 3 mg by mouth At Bedtime         psyllium (METAMUCIL/KONSYL) capsule Take 1 capsule by mouth 2 times daily        QUEtiapine (SEROQUEL) 25 MG tablet Take 12.5 mg by mouth 2 times daily as needed (dementia with behavioral disturbance)        rivaroxaban ANTICOAGULANT (XARELTO) 15 MG TABS tablet Take 15 mg by mouth every morning        sennosides (SENOKOT) 8.6 MG tablet Take 1 tablet by mouth every 8 hours as needed for constipation        venlafaxine (EFFEXOR) 75 MG tablet Take 75 mg by mouth 3 times daily        vitamin D3 (CHOLECALCIFEROL) 50 mcg (2000 units) tablet Take 50 mcg by mouth every morning         White Petrolatum-Mineral Oil (REFRESH P.M.) OINT Place into both eyes At Bedtime Place a small pea size amount into lower conjunctival sac               Medications:        Current Facility-Administered Medications   Medication Last Rate     - MEDICATION INSTRUCTIONS -       Current Facility-Administered Medications   Medication Dose Route Frequency     sodium chloride (PF)  3 mL Intracatheter Q8H     Current Facility-Administered Medications   Medication Dose Route Frequency     acetaminophen  650 mg Oral Q4H PRN    Or     acetaminophen  650 mg Per NG tube Q4H PRN    Or     acetaminophen  650 mg Rectal Q4H PRN     atropine  2 drop Sublingual Q4H PRN     artificial tears  1-2 drop Both  Eyes Q1H PRN     HYDROmorphone  1 mg Oral Q2H PRN    Or     HYDROmorphone  1 mg Oral Q2H PRN     HYDROmorphone  2 mg Oral Q2H PRN    Or     HYDROmorphone  2 mg Oral Q2H PRN     HYDROmorphone  0.3 mg Intravenous Q15 Min PRN     HYDROmorphone  0.5 mg Intravenous Q15 Min PRN     lidocaine 4%   Topical Q1H PRN     lidocaine (buffered or not buffered)  0.1-1 mL Other Q1H PRN     LORazepam  1 mg Intravenous Q3H PRN    Or     LORazepam  1 mg Sublingual Q3H PRN     - MEDICATION INSTRUCTIONS -   Does not apply Continuous PRN     naloxone  0.1 mg Intravenous Q2 Min PRN     naloxone  0.1 mg Intravenous Q2 Min PRN     naloxone  0.2 mg Intravenous Q2 Min PRN     naloxone  0.2 mg Intravenous Q2 Min PRN     nitroGLYcerin  0.4 mg Sublingual Q5 Min PRN     ondansetron  4 mg Oral Q6H PRN    Or     ondansetron  4 mg Intravenous Q6H PRN     prochlorperazine  5 mg Intravenous Q6H PRN    Or     prochlorperazine  5 mg Oral Q6H PRN    Or     prochlorperazine  12.5 mg Rectal Q12H PRN     QUEtiapine  12.5 mg Oral BID PRN     sodium chloride (PF)  3 mL Intracatheter q1 min prn     ___________________________________________________________________________  Medications     - MEDICATION INSTRUCTIONS -         sodium chloride (PF)  3 mL Intracatheter Q8H          Data:      Lab data reviewed.     Data   Recent Labs   Lab 12/29/22 2014   WBC 10.2   HGB 12.6*         INR 1.41*      POTASSIUM 5.2   CHLORIDE 106   CO2 30   BUN 25   CR 0.97   ANIONGAP 2*   BEATRIZ 8.5   GLC 95           Imaging:      Imaging data reviewed.     No results found for this or any previous visit (from the past 24 hour(s)).    Dr. Bigg Bentley DO, MARIAMA, [facility]  Pager [phone]

## 2023-01-01 NOTE — PHARMACY-ADMISSION MEDICATION HISTORY
Admission medication history completed at [facility]. Please see Pharmacy - Admission Medication History note from 12/29/2022.

## 2023-01-01 NOTE — PROGRESS NOTES
[facility]    Progress Note - [facility]    ______________________________________________________________________    [facility] 24/7 Contact Number: [phone]    - Providers: Please contact [facility] with changes in orders or clinical plan of care   - Nursing: Please contact [facility] with significant changes in patient condition  ______________________________________________________________________        Plan of Care Discussed with the Following:   - Nurse: PRASHANTH Guzmán  - Hospitalist/Rounding Provider: Dr. Mc Hernadez's Family/Preferred Contact: sister Raheem  - Hospice Provider: Dr. Adam Pond    Summary of Visit (includes assessment, medications and any new orders):   [facility] daily visit assessment.  Patient was resting comfortably, minimally responsive, briefly opens eyes to voice and touch. HR 76, O2 sats 92 percent on RA.  Patient grimaces with movement or repositioning. Patient has utilized 6 PRN doses of Dilaudid since midnight. Spoke to Hospice MD Dr. Adam Pond regarding pain management.     New medication recommendation per Dr. Pond    Schedule Roxanol 5 mg  SL/PO  Q4H for pain/SOB  PRN Roxanol 5 mg SL/PO Q1H as needed for pain/SOB    Recommending to medicate patient for any non verbal signs of pain or prior to repositioning and cares. Hospice will continue to assess patient daily.     Belen Henriquez, RN

## 2023-01-01 NOTE — PLAN OF CARE
Pt here s/p fall, hip fracture, UTI, sepsis and occipital ICH.  RUTH ANN orientation.  Does not follow commands.  Pt on comfort cares.  Pt asleep majority of shift.  Appears comfortable.  Oral cares done.    Large discolored/purple, blistered area noted near sacrum during morning skin assessment.  Some of the discolored area is non-blanchable.  MD notified for WOCN consult.  Pulsate mattress ordered.  mepilex applied.  T/R q 2 h.  IV dilaudid given for pain/repositioning.

## 2023-01-01 NOTE — PLAN OF CARE
Comfort cares maintained. Pt here Pt here s/p fall, hip fracture, UTI, sepsis and occipital ICH. Contact precautions for ESBL . RUTH ANN orientation. Pt does not follow commands. PI on sacrum, mepilex in place. Incontinent, has garcia. Pain with movement, PRN IV dilaudid given multiple times. T/R PRN for comfort. Pt slept between cares.

## 2023-01-02 NOTE — PROGRESS NOTES
[facility]    Progress Note - [facility]    ______________________________________________________________________    [facility] Contact Number: [phone]    - Providers: Please contact [facility] with changes in orders or clinical plan of care   - Nursing: Please contact [facility] with significant changes in patient condition  ______________________________________________________________________      Hospitalist/Rounding Provider:     Dr.Anderson Marcelina Hernadez's Family/Preferred Contact: Cassi, sister  - Hospice Provider: Adam Pond    Summary of Visit (includes assessment, medications and any new orders):     Maricarmen is minimally responsive and appears comfortable at rest.   Continues to show signs of discomfort with movement / repositioning.  Hospice would recommend adding in lorazepam 1 mg IV every 3 hours PRN for anxiety or work of breathing.  Recommend administering lorazepam at regular intervals for optimal comfort.    medication recommendation:     Schedule Roxanol 5 mg  SL/PO  Q4H for pain/SOB  PRN Roxanol 5 mg SL/PO Q1H as needed for pain/SOB         Andree Garcia RN

## 2023-01-02 NOTE — PROGRESS NOTES
[facility]    Medicine Progress Note - Hospitalist Service    Date of Admission:  12/31/2022    Assessment & Plan    Maricarmen Lovell is a 88 year old male with past medical history significant for Alzheimer's Disease, Chronic atrial fibrillation on anticoagulation, CKD stage III, HTN, COPD, depression, and frequent falls admitted on 12/29/2022 after a fall. He is found to have a new intertrochanteric fracture of the right hip. as well as a probable subacute 1.3cm parenchymal hematoma in the right occipitoparietal region.      Acute hypoxic respiratory failure 2/2 aspiration event  - Comfort cares.      Severe Sepsis likely 2/2 UTI vs Aspiration vs other   - Comfort cares.      Right intertrochanteric hip fracture   Unwitnessed fall   Pt suffered an unwitnessed fall at his assisted living facility. Circumstances of the fall are unclear. He complained of right hip pain. XR showed Intertrochanteric fracture of the right hip. No dislocation. Postoperative change left hip arthroplasty. Pelvis negative for fracture.   - Comfort cares.      Intraparenchymal hematoma, likely subacute, with surrounding vasogenic edema and mild localized mass effect.   Hx of prior head bleed (October 2022, September 2020)    Concern for cerebral amyloid angiopathy   Pt has a hx of both IPH as well as SAH in October of 2022. He was evaluated by neurology who thought this was likely related to amyloid angiopathy. His anticoagulation was held for a period of time, but then ultimately resumed. CT head obtained this admission showed new 1.3 cm parenchymal hematoma in the right occipitoparietal region with surrounding vasogenic edema. The appearance of this favors a subacute age. There is mild localized mass effect without midline shift or herniation. There has been interval resolution of previously described hemorrhage in the lateral right temporal lobe as well as resolution of the previously demonstrated right occipital  subarachnoid hemorrhage.   - Comfort cares.      Chronic atrial fibrillation on anticoagulation   - Comfort cares.      Mild normocytic anemia   - Comfort cares.      Acute toxic metabolic encephalopathy   Alzheimer's Dementia   Parkinsonism  Frequent Falls   Depression/Anxiety  - Comfort cares.      Solorio Catheter: PRESENT, indication: End of Life  Lines: None     Cardiac Monitoring: None  Code Status: No CPR- Do NOT Intubate      Clinically Significant Risk Factors                                Disposition Plan      Expected Discharge Date: 01/03/2023    Discharge Delays: Comfort Care/Hospice              Carlos Cota MD, MD  Hospitalist Service  [facility]  Securely message with [employer] (more info)  Text page via [employer] Paging/Directory   ______________________________________________________________________    Interval History   Resting comfortably  D/W RN    Physical Exam   Vital Signs:           Resp: 16 SpO2: 92 % O2 Device: None (Room air)    Weight: 0 lbs 0 oz  Comfortable  Breathing non labored    Medical Decision Making       30 MINUTES SPENT BY ME on the date of service doing chart review, history, exam, documentation & further activities per the note.      Data        Reviewed the labs :  [facility]   Lab 12/29/22 2014      POTASSIUM 5.2   CHLORIDE 106   BEATRIZ 8.5   CO2 30   BUN 25   CR 0.97   GLC 95     CBC  Recent Labs   Lab 12/29/22 2014   WBC 10.2   RBC 3.66*   HGB 12.6*   HCT 36.6*      MCH 34.4*   MCHC 34.4   RDW 11.9        INR  Recent Labs   Lab 12/29/22 2014   INR 1.41*     LFTsNo lab results found in last 7 days.   PANCNo lab results found in last 7 days.

## 2023-01-02 NOTE — PLAN OF CARE
Comfort cares maintained. Pt here Pt here s/p fall, R hip fracture, UTI, sepsis and occipital ICH. Contact precautions for ESBL. Pt does not follow commands. Pressure injury on sacrum, mepilex in place. Incontinent, has garcia. Pain with movement, PRN IV dilaudid given approximately q2h. T/R. Pt resting between cares, appears comfortable.

## 2023-01-02 NOTE — PLAN OF CARE
January 2, 2023  Shift:0700-1930  Maricarmen Lovell  88 year old  YOB: 1934    Reason for admission:Hospice care [Z51.5]    Cognition/Mentation:RUTH ANN  Neuros/CMS:RUTH ANN  VS:on RA  Cardiac:RUTH ANN  GI:incontinent  :garcia in place  Pulmonary:WDL  Pain:shows signs of pain by being restless and moaning, pain with movement,  IV dilaudid given  Drains/Lines:PIV x 2 SL  Skin:pressure injury on sacrum, mepilex in place  Activity:lift    Shift summary:Turn and repositioned every 2 hrs, oral care, pulsate mattress applied to bed, maintained contact precautions for ESBL

## 2023-01-02 NOTE — PLAN OF CARE
Pt here s/p fall w/ hip fracture, ICH and UTI.  On comfort cares.  IV dilaudid for pain with repo.  Repo q 2 h.  Solorio patent.  Still has some urine output.  No po intake today.  Breathing appears regular non labored.  Bruised and blistered area to sacrum.  Pt opens eyes to touch, otherwise non responsive.  Oral cares done ~ q 2 h.  PIV x 2 saline locked.

## 2023-01-03 NOTE — PROGRESS NOTES
Notified provider about indwelling garcia catheter discussed removal or continued need.    Did provider choose to remove indwelling garcia catheter? No    Provider's garcia indication for keeping indwelling garcia catheter: End of Life.    Is there an order for indwelling garcia catheter? Yes

## 2023-01-03 NOTE — PLAN OF CARE
Patient on comfort cares. Gave pain meds and repositioned patient for comfort. Completed oral cares.

## 2023-01-03 NOTE — PROGRESS NOTES
[facility]    Medicine Progress Note - Hospitalist Service    Date of Admission:  12/31/2022    Assessment & Plan    Maricarmen Lovell is a 88 year old male with past medical history significant for Alzheimer's Disease, Chronic atrial fibrillation on anticoagulation, CKD stage III, HTN, COPD, depression, and frequent falls admitted on 12/29/2022 after a fall. He is found to have a new intertrochanteric fracture of the right hip. as well as a probable subacute 1.3cm parenchymal hematoma in the right occipitoparietal region.      1/3: Appears comfortable.  Will have hospice team review there recommendations for pain management as he is comfortable    Acute hypoxic respiratory failure 2/2 aspiration event  - Comfort cares.      Severe Sepsis likely 2/2 UTI vs Aspiration vs other   - Comfort cares.      Right intertrochanteric hip fracture   Unwitnessed fall   Pt suffered an unwitnessed fall at his assisted living facility. Circumstances of the fall are unclear. He complained of right hip pain. XR showed Intertrochanteric fracture of the right hip. No dislocation. Postoperative change left hip arthroplasty. Pelvis negative for fracture.   - Comfort cares.      Intraparenchymal hematoma, likely subacute, with surrounding vasogenic edema and mild localized mass effect.   Hx of prior head bleed (October 2022, September 2020)    Concern for cerebral amyloid angiopathy   Pt has a hx of both IPH as well as SAH in October of 2022. He was evaluated by neurology who thought this was likely related to amyloid angiopathy. His anticoagulation was held for a period of time, but then ultimately resumed. CT head obtained this admission showed new 1.3 cm parenchymal hematoma in the right occipitoparietal region with surrounding vasogenic edema. The appearance of this favors a subacute age. There is mild localized mass effect without midline shift or herniation. There has been interval resolution of previously  described hemorrhage in the lateral right temporal lobe as well as resolution of the previously demonstrated right occipital subarachnoid hemorrhage.   - Comfort cares.      Chronic atrial fibrillation on anticoagulation   - Comfort cares.      Mild normocytic anemia   - Comfort cares.      Acute toxic metabolic encephalopathy   Alzheimer's Dementia   Parkinsonism  Frequent Falls   Depression/Anxiety  - Comfort cares.      Solorio Catheter: PRESENT, indication: End of Life  Lines: None     Cardiac Monitoring: None  Code Status: No CPR- Do NOT Intubate      Clinically Significant Risk Factors                                Disposition Plan      Expected Discharge Date: 01/04/2023    Discharge Delays: Comfort Care/Hospice              Carlos Cota MD, MD  Hospitalist Service  [facility]  Securely message with [employer] (more info)  Text page via [employer] Paging/Directory   ______________________________________________________________________    Interval History   Resting comfortably.   D/W RN    Physical Exam   Vital Signs:                    Weight: 0 lbs 0 oz  Comfortable  Breathing non labored    Medical Decision Making       30 MINUTES SPENT BY ME on the date of service doing chart review, history, exam, documentation & further activities per the note.      Data        Reviewed the labs :  BMP  Recent Labs   Lab 12/29/22 2014      POTASSIUM 5.2   CHLORIDE 106   BEATRIZ 8.5   CO2 30   BUN 25   CR 0.97   GLC 95     CBC  Recent Labs   Lab 12/29/22 2014   WBC 10.2   RBC 3.66*   HGB 12.6*   HCT 36.6*      MCH 34.4*   MCHC 34.4   RDW 11.9        INR  Recent Labs   Lab 12/29/22 2014   INR 1.41*     LFTsNo lab results found in last 7 days.   PANCNo lab results found in last 7 days.

## 2023-01-03 NOTE — PROGRESS NOTES
[facility]    Progress Note - [facility]    ______________________________________________________________________    [facility] Contact Number: [phone]    - [facility] in [facility] or clinical plan of care   - Nursing: Please contact [facility] with significant changes in patient condition  ______________________________________________________________________        Plan of Care Discussed with the Following:   - Nurse: PRASHANTH Erwin  - Hospitalist/Rounding Provider: Dr. Cota    - Maricarmen's Family/Preferred Contact: Sister Raheem  - Hospice Provider: Dr.Karel Pond    Summary of Visit (includes assessment, medications and any new orders):   GIP daily visit assessment  Patient is minimally responsive, appears very comfortable..Patient awoke to voice and touch, opened eyes. Denies pain/SOB, HR 89, 02 sats 91 percent on RA,  Mouth breathing, garcia in place. Pedal pulses noted.Patient will grimace with movement. Recommend administering PRN Dilaudid prior to repositioning and patient cares, use mouth swabs for comfort. Hospice will continue to assess patient daily.       Belen Henriquez RN

## 2023-01-03 NOTE — PLAN OF CARE
R hip fx, IPH, unwitnessed fall. Comfort cares. Q2H T/R, refusing oral cares. Solorio in place for end of life care. Good output. Slept through night. IV Dilaudid given once for pain.

## 2023-01-04 NOTE — CONSULTS
[facility]    Progress Note - [facility]    ______________________________________________________________________    [facility]  Contact Number: [phone]    - Providers: Please contact [facility] with changes in orders or clinical plan of care   - Nursing: Please contact [facility] with significant changes in patient condition  ______________________________________________________________________        Plan of Care Discussed with the Following:   - Nurse: Tiger  - Hospice Provider: Dr. Adam Pond    [facility] Eligibility: Pain, dyspnea    Family bereavement risk: low    /cremation facility:     Pertinent assessment information: O2 82% RA; P:103; RR 40. Limbs warm to the touch, pulses palpable in extremities. Lungs coarse rhonchi LLL/FRANCISCO; diminished/slight wheeze RUL/RLL. Limbs tense.     Hospice recommendations:   -Increase scheduled Morphine to 10mg q4h    Greatly appreciate the care that is being provided by the bedside staff for this patient.    Madhavi Kumari RN  [facility]  Contact information available via [facility] Paging/Directory     Listed as [facility]osp[facility]

## 2023-01-04 NOTE — PROGRESS NOTES
[facility]    Medicine Progress Note - Hospitalist Service    Date of Admission:  12/31/2022    Assessment & Plan    Maricarmen Lovell is a 88 year old male with past medical history significant for Alzheimer's Disease, Chronic atrial fibrillation on anticoagulation, CKD stage III, HTN, COPD, depression, and frequent falls admitted on 12/29/2022 after a fall. He is found to have a new intertrochanteric fracture of the right hip. as well as a probable subacute 1.3cm parenchymal hematoma in the right occipitoparietal region.      1/4: Appears comfortable.     Acute hypoxic respiratory failure 2/2 aspiration event  - Comfort cares.      Severe Sepsis likely 2/2 UTI vs Aspiration vs other   - Comfort cares.      Right intertrochanteric hip fracture   Unwitnessed fall   Pt suffered an unwitnessed fall at his assisted living facility. Circumstances of the fall are unclear. He complained of right hip pain. XR showed Intertrochanteric fracture of the right hip. No dislocation. Postoperative change left hip arthroplasty. Pelvis negative for fracture.   - Comfort cares.      Intraparenchymal hematoma, likely subacute, with surrounding vasogenic edema and mild localized mass effect.   Hx of prior head bleed (October 2022, September 2020)    Concern for cerebral amyloid angiopathy   Pt has a hx of both IPH as well as SAH in October of 2022. He was evaluated by neurology who thought this was likely related to amyloid angiopathy. His anticoagulation was held for a period of time, but then ultimately resumed. CT head obtained this admission showed new 1.3 cm parenchymal hematoma in the right occipitoparietal region with surrounding vasogenic edema. The appearance of this favors a subacute age. There is mild localized mass effect without midline shift or herniation. There has been interval resolution of previously described hemorrhage in the lateral right temporal lobe as well as resolution of the previously  demonstrated right occipital subarachnoid hemorrhage.   - Comfort cares.      Chronic atrial fibrillation on anticoagulation   - Comfort cares.      Mild normocytic anemia   - Comfort cares.      Acute toxic metabolic encephalopathy   Alzheimer's Dementia   Parkinsonism  Frequent Falls   Depression/Anxiety  - Comfort cares.      Solorio Catheter: PRESENT, indication: End of Life  Lines: None     Cardiac Monitoring: None  Code Status: No CPR- Do NOT Intubate      Clinically Significant Risk Factors                                Disposition Plan      Expected Discharge Date: 01/05/2023    Discharge Delays: Comfort Care/Hospice              Carlos Cota MD, MD  Hospitalist Service  [facility]  Securely message with [employer] (more info)  Text page via [facility] Paging/Directory   ______________________________________________________________________    Interval History   Resting comfortably.   D/W RN    Physical Exam   Vital Signs:           Resp: 24        Weight: 0 lbs 0 oz  Comfortable  Breathing non labored    Medical Decision Making       30 MINUTES SPENT BY ME on the date of service doing chart review, history, exam, documentation & further activities per the note.      Data        Reviewed the labs :  BMP  Recent Labs   Lab 12/29/22 2014      POTASSIUM 5.2   CHLORIDE 106   BEATRIZ 8.5   CO2 30   BUN 25   CR 0.97   GLC 95     CBC  Recent Labs   Lab 12/29/22 2014   WBC 10.2   RBC 3.66*   HGB 12.6*   HCT 36.6*      MCH 34.4*   MCHC 34.4   RDW 11.9        INR  Recent Labs   Lab 12/29/22 2014   INR 1.41*     LFTsNo lab results found in last 7 days.   PANCNo lab results found in last 7 days.

## 2023-01-05 NOTE — PLAN OF CARE
Pt on comfort cares. Alert but non-verbal and does not interact much with care providers.  RR 24-40 and shallow. JOSÉ MIGUEL. Moans and grimaces with re-positioning - prn morphine (and scheduled ) administered. Repositioned q 2 hrs and frequent oral cares provided. Solorio patent with 300 ml dark yellow urine over 8 hrs. NPO d/t lack or alertness and inability to manage oral secretions. Pre-existing coccyx wound cleaned with wound cleanser and foam dressing applied. Repositioned q 2 hrs. Sister updated by phone.

## 2023-01-05 NOTE — PROGRESS NOTES
[facility]    Medicine Progress Note - Hospitalist Service    Date of Admission:  12/31/2022    Assessment & Plan    Maricarmen Lovell is a 88 year old male with past medical history significant for Alzheimer's Disease, Chronic atrial fibrillation on anticoagulation, CKD stage III, HTN, COPD, depression, and frequent falls admitted on 12/29/2022 after a fall. He is found to have a new intertrochanteric fracture of the right hip. as well as a probable subacute 1.3cm parenchymal hematoma in the right occipitoparietal region.      1/5: Appears comfortable.     Acute hypoxic respiratory failure 2/2 aspiration event  - Comfort cares.      Severe Sepsis likely 2/2 UTI vs Aspiration vs other   - Comfort cares.      Right intertrochanteric hip fracture   Unwitnessed fall   Pt suffered an unwitnessed fall at his assisted living facility. Circumstances of the fall are unclear. He complained of right hip pain. XR showed Intertrochanteric fracture of the right hip. No dislocation. Postoperative change left hip arthroplasty. Pelvis negative for fracture.   - Comfort cares.      Intraparenchymal hematoma, likely subacute, with surrounding vasogenic edema and mild localized mass effect.   Hx of prior head bleed (October 2022, September 2020)    Concern for cerebral amyloid angiopathy   Pt has a hx of both IPH as well as SAH in October of 2022. He was evaluated by neurology who thought this was likely related to amyloid angiopathy. His anticoagulation was held for a period of time, but then ultimately resumed. CT head obtained this admission showed new 1.3 cm parenchymal hematoma in the right occipitoparietal region with surrounding vasogenic edema. The appearance of this favors a subacute age. There is mild localized mass effect without midline shift or herniation. There has been interval resolution of previously described hemorrhage in the lateral right temporal lobe as well as resolution of the previously  demonstrated right occipital subarachnoid hemorrhage.   - Comfort cares.      Chronic atrial fibrillation on anticoagulation   - Comfort cares.      Mild normocytic anemia   - Comfort cares.      Acute toxic metabolic encephalopathy   Alzheimer's Dementia   Parkinsonism  Frequent Falls   Depression/Anxiety  - Comfort cares.      Solorio Catheter: PRESENT, indication: Retention  Lines: None     Cardiac Monitoring: None  Code Status: No CPR- Do NOT Intubate      Clinically Significant Risk Factors                                Disposition Plan      Expected Discharge Date: 01/06/2023    Discharge Delays: Comfort Care/Hospice              Carlos Cota MD, MD  Hospitalist Service  [facility]  Securely message with [employer] (more info)  Text page via [facility] Paging/Directory   ______________________________________________________________________    Interval History   Resting comfortably.   D/W RN    Physical Exam   Vital Signs:           Resp: 30        Weight: 0 lbs 0 oz  Comfortable  Breathing -agonal    Medical Decision Making       30 MINUTES SPENT BY ME on the date of service doing chart review, history, exam, documentation & further activities per the note.      Data        Reviewed the labs :  BMP  Recent Labs   Lab 12/29/22 2014      POTASSIUM 5.2   CHLORIDE 106   BEATRIZ 8.5   CO2 30   BUN 25   CR 0.97   GLC 95     CBC  Recent Labs   Lab 12/29/22 2014   WBC 10.2   RBC 3.66*   HGB 12.6*   HCT 36.6*      MCH 34.4*   MCHC 34.4   RDW 11.9        INR  Recent Labs   Lab 12/29/22 2014   INR 1.41*     LFTsNo lab results found in last 7 days.   PANCNo lab results found in last 7 days.

## 2023-01-05 NOTE — CARE PLAN
[id number]    Pt pronounced  at 1445. Comfort cares provided before passing. Pt was T&R q2h. Sched & PRN morphine & PRN ativan given. Mouth swabs done q2h. Solorio positional. 2 PIV SL. Sister informed of TOD. Record of death filled & handed to security.

## 2023-01-05 NOTE — DISCHARGE SUMMARY
[facility]    Death Summary - Hospitalist Service     Date of Admission:  12/31/2022  Date of Death: 1/5/2023  5:11 PM  Discharging Provider: Carlos Cota MD, MD    Discharge Diagnoses     Acute hypoxic respiratory failure 2/2 aspiration event    Severe Sepsis likely 2/2 UTI vs Aspiration vs other      Right intertrochanteric hip fracture   Unwitnessed fall     Intraparenchymal hematoma, likely subacute, with surrounding vasogenic edema and mild localized mass effect.   Hx of prior head bleed (October 2022, September 2020)    Concern for cerebral amyloid angiopathy     Chronic atrial fibrillation on anticoagulation   Mild normocytic anemia      Acute toxic metabolic encephalopathy   Alzheimer's Dementia   Parkinsonism  Frequent Falls   Depression/Anxiety  - Comfort cares.     Cause of death: Acute hypoxic respiratory failure     Hospital Course    Maricarmen Lovell is a 88 year old male with past medical history significant for Alzheimer's Disease, Chronic atrial fibrillation on anticoagulation, CKD stage III, HTN, COPD, depression, and frequent falls admitted on 12/29/2022 after a fall. He is found to have a new intertrochanteric fracture of the right hip. as well as a probable subacute 1.3cm parenchymal hematoma in the right occipitoparietal region.       Acute hypoxic respiratory failure 2/2 aspiration event  - Comfort cares.      Severe Sepsis likely 2/2 UTI vs Aspiration vs other   - Comfort cares.      Right intertrochanteric hip fracture   Unwitnessed fall   Pt suffered an unwitnessed fall at his assisted living facility. Circumstances of the fall are unclear. He complained of right hip pain. XR showed Intertrochanteric fracture of the right hip. No dislocation. Postoperative change left hip arthroplasty. Pelvis negative for fracture.   - Comfort cares.      Intraparenchymal hematoma, likely subacute, with surrounding vasogenic edema and mild localized mass effect.   Hx of prior head  bleed (October 2022, September 2020)    Concern for cerebral amyloid angiopathy   Pt has a hx of both IPH as well as SAH in October of 2022. He was evaluated by neurology who thought this was likely related to amyloid angiopathy. His anticoagulation was held for a period of time, but then ultimately resumed. CT head obtained this admission showed new 1.3 cm parenchymal hematoma in the right occipitoparietal region with surrounding vasogenic edema. The appearance of this favors a subacute age. There is mild localized mass effect without midline shift or herniation. There has been interval resolution of previously described hemorrhage in the lateral right temporal lobe as well as resolution of the previously demonstrated right occipital subarachnoid hemorrhage.   - Comfort cares.      Chronic atrial fibrillation on anticoagulation   - Comfort cares.      Mild normocytic anemia   - Comfort cares.      Acute toxic metabolic encephalopathy   Alzheimer's Dementia   Parkinsonism  Frequent Falls   Depression/Anxiety  - Comfort cares.     Carlos Cota MD, MD  [facility]  ______________________________________________________________________      Significant Results and Procedures   Results for orders placed or performed during the hospital encounter of 12/29/22   CT Head w/o Contrast    Narrative    EXAM: CT HEAD W/O CONTRAST  LOCATION: [facility]  DATE/TIME: 12/29/2022 8:39 PM    INDICATION: Fall, trauma, pain.  COMPARISON: MRI 03/09/2021.  TECHNIQUE: Routine CT Head without IV contrast. Multiplanar reformats. Dose reduction techniques were used.    FINDINGS:  INTRACRANIAL CONTENTS: The previously demonstrated hemorrhage in the lateral right temporal lobe is no longer visualized. There is mild hypoattenuation at its former site, likely representing edema and/or gliosis. Right occipital sulcal subarachnoid   hemorrhage is also resolved. There is a new 1.3 cm focus of  parenchymal hemorrhage in the right occipitoparietal region (image 19) with surrounding edema. The ill-defined appearance of this hemorrhage favors a subacute age. There is moderate scattered   white matter hypoattenuation, which is nonspecific. This commonly reflects chronic small vessel ischemic change. There is a small chronic left parietal cortical infarction. Diffuse and proportionate prominence of the ventricles, sulci and cisterns is   compatible with moderate generalized parenchymal atrophy. The sella is unremarkable for technique. The cerebellar tonsils are appropriately positioned.     VISUALIZED ORBITS/SINUSES/MASTOIDS: Prior bilateral cataract surgery. Visualized portions of the orbits are otherwise unremarkable. No paranasal sinus mucosal disease. No middle ear or mastoid effusion.    BONES/SOFT TISSUES: No scalp hematoma. No skull fracture.      Impression    IMPRESSION:  1.  New 1.3 cm parenchymal hematoma in the right occipitoparietal region with surrounding vasogenic edema. The appearance of this favors a subacute age. There is mild localized mass effect without midline shift or herniation.  2.  Interval resolution of previously described hemorrhage in the lateral right temporal lobe.  3.  Resolution of previously demonstrated right occipital subarachnoid hemorrhage.  4.  No new intracranial hemorrhage demonstrated.  5.  No evidence of acute calvarial fracture.  6.  Age-related and chronic ischemic changes as above.    Results discussed with Dr. Mccurdy on 12/29/2022 9:00 PM.   CT Cervical Spine w/o Contrast    Narrative    EXAM: CT CERVICAL SPINE W/O CONTRAST  LOCATION: [facility]  DATE/TIME: 12/29/2022 8:40 PM    INDICATION: Fall, trauma, pain.  COMPARISON: 11/17/2021.  TECHNIQUE: Routine CT Cervical Spine without IV contrast. Multiplanar reformats. Dose reduction techniques were used.    FINDINGS:  ALIGNMENT: Slight retrolisthesis at C5-C6, similar to prior. Symmetric  facet alignment.    BONES: There is no evidence of acute displaced fractures. The occipital condyles appear intact. Vertebral body heights are unremarkable. No destructive bony lesions are apparent.    DISCS: Moderate C5-C6 and C6-C7 spondylosis with intervertebral disc height loss and disc-osteophyte complex formation.    SPINAL CANAL: No high-grade stenosis.    NEURAL FORAMINA: Moderate to severe left C5-C6 and moderate left C6-C7 neural foraminal stenosis.    SOFT TISSUES: No prevertebral soft tissue thickening. Unremarkable paraspinal soft tissues.    UPPER THORAX: The visualized lung apices are well aerated.      Impression    IMPRESSION:  1.  No evidence of acute displaced fracture.  2.  No evidence of traumatic subluxation.  3.  Degenerative change.   XR Pelvis w Hip Right 1 View    Narrative    EXAM: XR PELVIS AND HIP RIGHT 1 VIEW  LOCATION: [facility]  DATE/TIME: 12/29/2022 8:48 PM    INDICATION: Trauma, pain.  COMPARISON: None.      Impression    IMPRESSION: Intertrochanteric fracture of the right hip. No dislocation. Postoperative change left hip arthroplasty. Pelvis negative for fracture.   CT Head w/o Contrast    Narrative    EXAM: CT HEAD W/O CONTRAST  LOCATION: [facility]  DATE/TIME: 12/30/2022 3:55 AM    INDICATION: Headache. Known intracranial hemorrhage.  COMPARISON: CT head 12/29/2022  TECHNIQUE: Routine CT Head without IV contrast. Multiplanar reformats. Dose reduction techniques were used.    FINDINGS:  INTRACRANIAL CONTENTS: Right occipital temporal region evolving acute to subacute hematoma measuring approximately 1.4 cm with surrounding edema similar compared to CT head 12/29/2022. No new or significant progressive acute intraparenchymal hemorrhages.   No significant midline shift.     No CT evidence of acute infarct. Moderate presumed chronic small vessel ischemic changes. Moderate generalized volume loss. No hydrocephalus. Left parietal  lobe small chronic infarct.    VISUALIZED ORBITS/SINUSES/MASTOIDS: No intraorbital abnormality. No paranasal sinus mucosal disease. No middle ear or mastoid effusion.    BONES/SOFT TISSUES: No acute abnormality.      Impression    IMPRESSION:  1.  No significant interval change compared to CT head 12/29/2022.     XR Chest Port 1 View    Narrative    EXAM: XR CHEST PORT 1 VIEW  LOCATION: [facility]  DATE/TIME: 12/30/2022 3:46 AM    INDICATION: Possible aspiration.  COMPARISON: 01/12/2021      Impression    IMPRESSION:     Mild left hemidiaphragm elevation.    No focal airspace disease. No pleural effusion or pneumothorax.    Stable nonenlarged cardiomediastinal silhouette. Aortic calcifications.       Consultations This Hospital Stay   [facility] ADULT CONSULT  PHARMACY IP CONSULT    Primary Care Physician   [facility]    Time Spent on this Encounter   Carlos ARREGUIN MD, MD, personally saw the patient today and spent greater than 30 minutes discharging this patient.

## 2023-01-05 NOTE — PROGRESS NOTES
[facility] Death Pronouncement    Called by nursing staff to pronounce Maricarmen Lovell dead.    Physical Exam: Unresponsive to noxious stimuli, Spontaneous respirations absent, Breath sounds absent, Carotid pulse absent, Heart sounds absent, Pupillary light reflex absent and Corneal blink reflex absent    Patient was pronounced dead at 2:45 PM, 2023.    Active Problems:    * No active hospital problems. *       Infectious disease present?: YES    Communicable disease present? (examples: HIV, chicken pox, TB, Ebola, CJD) :  NO    Multi-drug resistant organism present? (example: MRSA): YES    Please consider an autopsy if any of the following exist:  NO Unexpected or unexplained death during or following any dental, medical, or surgical diagnostic treatment procedures.   NO Death of mother at or up to seven days after delivery.     NO All  and pediatric deaths.     NO Death where the cause is sufficiently obscure to delay completion of the death certificate.   NO Deaths in which autopsy would confirm a suspected illness/condition that would affect surviving family members or recipients of transplanted organs.     The following deaths must be reported to the 's Office:  YES A death that may be due entirely or in part to any factors other than natural disease (recent surgery, recent trauma, suspected abuse/neglect).   NO A death that may be an accident, suicide, or homicide.     NO Any sudden, unexpected death in which there is no prior history of significant heart disease or any other condition associated with sudden death.   NO A death under suspicious, unusual, or unexpected circumstances.    NO Any death which is apparently due to natural causes but in which the  does not have a personal physician familiar with the patient s medical history, social, or environmental situation or the circumstances of the terminal event.   NO Any death apparently due to Sudden Infant Death Syndrome.      NO Deaths that occur during, in association with, or as consequences of a diagnostic, therapeutic, or anesthetic procedure.   YES Any death in which a fracture of a major bone has occurred within the past (6) six months.   NO A death of persons not seen by their physician within 120 days of demise.     NO Any death in which the  was an inmate of a public institution or was in the custody of Law Enforcement personnel.   NO  All unexpected deaths of children   NO Solid organ donors   NO Unidentified bodies   Pending Deaths of persons whose bodies are to be cremated or otherwise disposed of so that the bodies will later be unavailable for examination;   NO Deaths unattended by a physician outside of a licensed healthcare facility or licensed residential hospice program   NO Deaths occurring within 24 hours of arrival to a health care facility if death is unexpected.    NO Deaths associated with the decedent s employment.   NO Deaths attributed to acts of terrorism.   NO Any death in which there is uncertainty as to whether it is a medical examiner s care should be discussed with the medical investigator.      Death Certificate to be directed to Dr. Carlos Cota, hospitalist  Attending physician, Dr. Cota, notified of death.    Body disposition: Body released to the morgue.    NARESH Johns, [facility]  [facility]

## 2023-01-05 NOTE — PLAN OF CARE
Goal Outcome Evaluation: [id number]    VS and full assessment deferred r/t comfort cares.  PRN Ativan given x3 for tachypnea, breathing shallow. Scheduled Roxanol x3. Turn and repo q2-3. Solorio intact. Coccyx mepilex CDI.  Oral cares q2.  On pulsate mattress.  L PIV SL.

## 2023-01-05 NOTE — PROGRESS NOTES
MD Notification    Notified Person: MD    Notified Person Name: Dr. Cota    Notification Date/Time: 1532 2023    Notification Interaction: web-base paging     Purpose of Notification: FYI pt  at 1445. Death pronounced by Adam Sparrow.     Orders Received: acknowledged    Comments:
